# Patient Record
Sex: MALE | Race: WHITE | Employment: FULL TIME | URBAN - NONMETROPOLITAN AREA
[De-identification: names, ages, dates, MRNs, and addresses within clinical notes are randomized per-mention and may not be internally consistent; named-entity substitution may affect disease eponyms.]

---

## 2017-01-03 RX ORDER — MELOXICAM 15 MG/1
TABLET ORAL
Qty: 30 TABLET | Refills: 3 | Status: SHIPPED | OUTPATIENT
Start: 2017-01-03 | End: 2017-08-03

## 2017-01-11 RX ORDER — LANSOPRAZOLE 30 MG/1
CAPSULE, DELAYED RELEASE ORAL
Qty: 30 CAPSULE | Refills: 0 | Status: SHIPPED | OUTPATIENT
Start: 2017-01-11 | End: 2017-02-12

## 2017-01-11 NOTE — TELEPHONE ENCOUNTER
L/m on pt's v/m that he is due for OV and fasting labs.  Advised to please schedule these before he needs next refill-----medication refilled [id number] only

## 2017-01-12 RX ORDER — SIMVASTATIN 10 MG
TABLET ORAL
Qty: 30 TABLET | Refills: 0 | Status: SHIPPED | OUTPATIENT
Start: 2017-01-12 | End: 2017-03-12

## 2017-01-12 NOTE — TELEPHONE ENCOUNTER
Last OV: 7/1/2016  Last labs: 7/1/2016  Last filled: 12/15/2016 #30 no RF    Letter sent requesting pt to schedule labs

## 2017-01-28 ENCOUNTER — OFFICE VISIT (OUTPATIENT)
Dept: FAMILY MEDICINE CLINIC | Facility: CLINIC | Age: 57
End: 2017-01-28

## 2017-01-28 ENCOUNTER — PRIOR ORIGINAL RECORDS (OUTPATIENT)
Dept: OTHER | Age: 57
End: 2017-01-28

## 2017-01-28 VITALS
WEIGHT: 235 LBS | OXYGEN SATURATION: 98 % | SYSTOLIC BLOOD PRESSURE: 120 MMHG | TEMPERATURE: 98 F | BODY MASS INDEX: 35 KG/M2 | HEART RATE: 68 BPM | DIASTOLIC BLOOD PRESSURE: 70 MMHG

## 2017-01-28 DIAGNOSIS — I48.20 CHRONIC ATRIAL FIBRILLATION (HCC): ICD-10-CM

## 2017-01-28 DIAGNOSIS — E78.00 HYPERCHOLESTEREMIA: ICD-10-CM

## 2017-01-28 DIAGNOSIS — J00 ACUTE NASOPHARYNGITIS: ICD-10-CM

## 2017-01-28 DIAGNOSIS — M17.11 PRIMARY OSTEOARTHRITIS OF RIGHT KNEE: ICD-10-CM

## 2017-01-28 DIAGNOSIS — Z12.5 SCREENING FOR PROSTATE CANCER: ICD-10-CM

## 2017-01-28 DIAGNOSIS — K21.9 GASTROESOPHAGEAL REFLUX DISEASE WITHOUT ESOPHAGITIS: Primary | ICD-10-CM

## 2017-01-28 DIAGNOSIS — E11.9 TYPE 2 DIABETES MELLITUS WITHOUT COMPLICATION, WITHOUT LONG-TERM CURRENT USE OF INSULIN (HCC): ICD-10-CM

## 2017-01-28 DIAGNOSIS — R35.1 NOCTURIA: ICD-10-CM

## 2017-01-28 LAB
ALBUMIN SERPL-MCNC: 4 G/DL (ref 3.5–4.8)
ALP LIVER SERPL-CCNC: 85 U/L (ref 45–117)
ALT SERPL-CCNC: 57 U/L (ref 17–63)
APPEARANCE: CLEAR
AST SERPL-CCNC: 25 U/L (ref 15–41)
BILIRUB SERPL-MCNC: 0.6 MG/DL (ref 0.1–2)
BUN BLD-MCNC: 16 MG/DL (ref 8–20)
CALCIUM BLD-MCNC: 8.6 MG/DL (ref 8.3–10.3)
CHLORIDE: 109 MMOL/L (ref 101–111)
CHOLEST SMN-MCNC: 115 MG/DL (ref ?–200)
CO2: 26 MMOL/L (ref 22–32)
COMPLEXED PSA SERPL-MCNC: 0.79 NG/ML (ref 0.01–4)
CREAT BLD-MCNC: 0.81 MG/DL (ref 0.7–1.3)
EST. AVERAGE GLUCOSE BLD GHB EST-MCNC: 128 MG/DL (ref 68–126)
GLUCOSE BLD-MCNC: 103 MG/DL (ref 70–99)
HBA1C MFR BLD HPLC: 6.1 % (ref ?–5.7)
HDLC SERPL-MCNC: 31 MG/DL (ref 45–?)
HDLC SERPL: 3.71 {RATIO} (ref ?–4.97)
LDLC SERPL CALC-MCNC: 69 MG/DL (ref ?–130)
M PROTEIN MFR SERPL ELPH: 7 G/DL (ref 6.1–8.3)
MULTISTIX LOT#: NORMAL NUMERIC
NONHDLC SERPL-MCNC: 84 MG/DL (ref ?–130)
PH, URINE: 6 (ref 4.5–8)
POTASSIUM SERPL-SCNC: 4.3 MMOL/L (ref 3.6–5.1)
SODIUM SERPL-SCNC: 142 MMOL/L (ref 136–144)
SPECIFIC GRAVITY: 1.02 (ref 1–1.03)
TRIGLYCERIDES: 76 MG/DL (ref ?–150)
URINE-COLOR: YELLOW
UROBILINOGEN,SEMI-QN: 0.2 MG/DL (ref 0–1.9)
VLDL: 15 MG/DL (ref 5–40)

## 2017-01-28 PROCEDURE — 99214 OFFICE O/P EST MOD 30 MIN: CPT | Performed by: FAMILY MEDICINE

## 2017-01-28 PROCEDURE — 36415 COLL VENOUS BLD VENIPUNCTURE: CPT | Performed by: FAMILY MEDICINE

## 2017-01-28 PROCEDURE — 80053 COMPREHEN METABOLIC PANEL: CPT | Performed by: FAMILY MEDICINE

## 2017-01-28 PROCEDURE — 83036 HEMOGLOBIN GLYCOSYLATED A1C: CPT | Performed by: FAMILY MEDICINE

## 2017-01-28 PROCEDURE — 81003 URINALYSIS AUTO W/O SCOPE: CPT | Performed by: FAMILY MEDICINE

## 2017-01-28 PROCEDURE — 80061 LIPID PANEL: CPT | Performed by: FAMILY MEDICINE

## 2017-01-28 NOTE — PATIENT INSTRUCTIONS
I reviewed goals for fasting and postmeal blood sugars, hemoglobin A1c, lipids and blood pressure  I strongly encouraged patient to resume checking his blood sugar alternating fasting and 2 hour postmeal checks  We will check fasting lipids, CMP, hemoglobi

## 2017-01-28 NOTE — PROGRESS NOTES
HPI:    Patient ID: Jose Daniel Benson is a 65year old male. Patient presents with:  Diabetes: f/u  Lipids: f/u  Gastro-esophageal Reflux: f/u  Atrial Fibrillation: f/u    HPI pt not checking bs  Pt not checking bp   Pt not exercising, very active with ne Accu-Chek Softclix Lancets Does not apply Misc As directed-----to check blood glucose once daily Disp: 100 each Rfl: 12   EPINEPHrine (EPIPEN 2-ALEXEY) 0.3 MG/0.3ML Injection Device Inject 0.3 mL as directed as needed.  Disp: 2 Device Rfl: 1     Allergies: encounter diagnosis)  Primary osteoarthritis of right knee  Chronic atrial fibrillation (hcc)- currently in sinus rhythm  Acute nasopharyngitis  Nocturia  Patient Instructions   I reviewed goals for fasting and postmeal blood sugars, hemoglobin A1c, lipids

## 2017-01-30 ENCOUNTER — TELEPHONE (OUTPATIENT)
Dept: FAMILY MEDICINE CLINIC | Facility: CLINIC | Age: 57
End: 2017-01-30

## 2017-01-30 DIAGNOSIS — I48.20 CHRONIC ATRIAL FIBRILLATION (HCC): Primary | ICD-10-CM

## 2017-01-30 DIAGNOSIS — E78.00 HYPERCHOLESTEREMIA: ICD-10-CM

## 2017-01-30 DIAGNOSIS — E11.9 TYPE 2 DIABETES MELLITUS WITHOUT COMPLICATION, WITHOUT LONG-TERM CURRENT USE OF INSULIN (HCC): ICD-10-CM

## 2017-01-30 DIAGNOSIS — Z12.5 SCREENING FOR PROSTATE CANCER: ICD-10-CM

## 2017-01-30 DIAGNOSIS — Z79.899 ENCOUNTER FOR LONG-TERM (CURRENT) DRUG USE: ICD-10-CM

## 2017-01-30 RX ORDER — SIMVASTATIN 10 MG
5 TABLET ORAL NIGHTLY
Qty: 1 TABLET | Refills: 0 | COMMUNITY
Start: 2017-01-30 | End: 2017-03-24

## 2017-01-30 NOTE — TELEPHONE ENCOUNTER
----- Message from Lani Vargas.  Laz Gray DO sent at 1/28/2017 11:58 AM CST -----  Eyes patient that urine is perfectly clear, I suspect his urinating at night is related to his excessive fluid intake during the night

## 2017-01-30 NOTE — TELEPHONE ENCOUNTER
----- Message from Alberto Pimentel.  Brianne Pierson DO sent at 1/30/2017  7:21 AM CST -----  Advise pt of stable hgba1c in pre-dm range, cpm, recheck hgba1c in 6 months  Lipids are very low,may decrease simvastatin to 5 mg q hs and recheck 6 months  Chemistries al norm

## 2017-01-30 NOTE — TELEPHONE ENCOUNTER
Pt advised of both message below, verbalizes understanding.    Medlist updated, future orders placed

## 2017-02-01 ENCOUNTER — TELEPHONE (OUTPATIENT)
Dept: FAMILY MEDICINE CLINIC | Facility: CLINIC | Age: 57
End: 2017-02-01

## 2017-02-06 ENCOUNTER — TELEPHONE (OUTPATIENT)
Dept: FAMILY MEDICINE CLINIC | Facility: CLINIC | Age: 57
End: 2017-02-06

## 2017-02-13 RX ORDER — LANSOPRAZOLE 30 MG/1
CAPSULE, DELAYED RELEASE ORAL
Qty: 30 CAPSULE | Refills: 5 | Status: SHIPPED | OUTPATIENT
Start: 2017-02-13 | End: 2017-08-04

## 2017-02-13 NOTE — TELEPHONE ENCOUNTER
Lansoprazole 1/11/17 #30x0  Metformin 1/12/17 #30x0  Last ov 1/28/17  Last labs 1/28/17 re ck 6 mo    No future appointments.

## 2017-03-13 ENCOUNTER — PRIOR ORIGINAL RECORDS (OUTPATIENT)
Dept: OTHER | Age: 57
End: 2017-03-13

## 2017-03-13 RX ORDER — SIMVASTATIN 10 MG
5 TABLET ORAL NIGHTLY
Qty: 15 TABLET | Refills: 4 | Status: SHIPPED | OUTPATIENT
Start: 2017-03-13 | End: 2017-08-03

## 2017-03-15 LAB
ALBUMIN: 4 G/DL
ALKALINE PHOSPHATATE(ALK PHOS): 85 IU/L
BILIRUBIN TOTAL: 0.6 MG/DL
BUN: 16 MG/DL
CALCIUM: 8.6 MG/DL
CHLORIDE: 109 MEQ/L
CHOLESTEROL, TOTAL: 115 MG/DL
CREATININE, SERUM: 0.81 MG/DL
GLUCOSE: 103 MG/DL
HDL CHOLESTEROL: 31 MG/DL
HEMOGLOBIN A1C: 6.1 %
LDL CHOLESTEROL: 69 MG/DL
POTASSIUM, SERUM: 4.3 MEQ/L
PROTEIN, TOTAL: 7 G/DL
SGOT (AST): 25 IU/L
SGPT (ALT): 57 IU/L
SODIUM: 142 MEQ/L
TRIGLYCERIDES: 76 MG/DL

## 2017-03-17 ENCOUNTER — TELEPHONE (OUTPATIENT)
Dept: FAMILY MEDICINE CLINIC | Facility: CLINIC | Age: 57
End: 2017-03-17

## 2017-03-17 NOTE — TELEPHONE ENCOUNTER
Pt's wife calls. [facility] pt saw Dr. Beaver Born on 3/13 and he ordered a 24hr Holter Monitor. Pt is scheduled to have monitor put on tomorrow. Wife asks if we know if the referral for the monitor has been approved yet? Pt has [facility] insurance.   Advised we didn'

## 2017-03-23 ENCOUNTER — PRIOR ORIGINAL RECORDS (OUTPATIENT)
Dept: OTHER | Age: 57
End: 2017-03-23

## 2017-03-23 ENCOUNTER — TELEPHONE (OUTPATIENT)
Dept: FAMILY MEDICINE CLINIC | Facility: CLINIC | Age: 57
End: 2017-03-23

## 2017-03-23 DIAGNOSIS — I48.91 ATRIAL FIBRILLATION, UNSPECIFIED TYPE (HCC): Primary | ICD-10-CM

## 2017-03-23 NOTE — TELEPHONE ENCOUNTER
Spoke Nisha Nelson who states that she spoke with the insurance company who states that the holter monitor will be covered as long as it is placed in house.  Advised pt to contact Dr. Loren Lara office and see if the referral is placed and ask who is placing

## 2017-03-23 NOTE — TELEPHONE ENCOUNTER
Rodney Molina states that the spouse called their office and states that pt needs a referral for the monitor and per Rodney Molina, Dr. Kristi Rivas office does not do referrals as they are a considered a specialist. Rodney Molina states that the spouse states that it has to be throug

## 2017-03-23 NOTE — TELEPHONE ENCOUNTER
Who's requesting the holter? Why?   Has pt seen Dr Priyanka Barker recently?, if so, please get note

## 2017-03-24 ENCOUNTER — MED REC SCAN ONLY (OUTPATIENT)
Dept: FAMILY MEDICINE CLINIC | Facility: CLINIC | Age: 57
End: 2017-03-24

## 2017-03-24 ENCOUNTER — OFFICE VISIT (OUTPATIENT)
Dept: FAMILY MEDICINE CLINIC | Facility: CLINIC | Age: 57
End: 2017-03-24

## 2017-03-24 ENCOUNTER — HOSPITAL ENCOUNTER (OUTPATIENT)
Dept: CV DIAGNOSTICS | Facility: HOSPITAL | Age: 57
Discharge: HOME | End: 2017-03-24
Attending: FAMILY MEDICINE
Payer: COMMERCIAL

## 2017-03-24 VITALS
HEART RATE: 76 BPM | SYSTOLIC BLOOD PRESSURE: 120 MMHG | OXYGEN SATURATION: 99 % | DIASTOLIC BLOOD PRESSURE: 70 MMHG | TEMPERATURE: 98 F

## 2017-03-24 DIAGNOSIS — M13.0 POLYARTHRITIS OF MULTIPLE SITES: Primary | ICD-10-CM

## 2017-03-24 LAB
BASOPHILS # BLD AUTO: 0.04 X10(3) UL (ref 0–0.1)
BASOPHILS NFR BLD AUTO: 0.5 %
C-REACTIVE PROTEIN: <0.29 MG/DL (ref ?–1)
EOSINOPHIL # BLD AUTO: 0.12 X10(3) UL (ref 0–0.3)
EOSINOPHIL NFR BLD AUTO: 1.4 %
ERYTHROCYTE [DISTWIDTH] IN BLOOD BY AUTOMATED COUNT: 13.2 % (ref 11.5–16)
HCT VFR BLD AUTO: 46 % (ref 37–53)
HGB BLD-MCNC: 15.5 G/DL (ref 13–17)
IMMATURE GRANULOCYTE COUNT: 0.02 X10(3) UL (ref 0–1)
IMMATURE GRANULOCYTE RATIO %: 0.2 %
LYMPHOCYTES # BLD AUTO: 3.08 X10(3) UL (ref 0.9–4)
LYMPHOCYTES NFR BLD AUTO: 34.8 %
MCH RBC QN AUTO: 31.4 PG (ref 27–33.2)
MCHC RBC AUTO-ENTMCNC: 33.7 G/DL (ref 31–37)
MCV RBC AUTO: 93.1 FL (ref 80–99)
MONOCYTES # BLD AUTO: 0.81 X10(3) UL (ref 0.1–0.6)
MONOCYTES NFR BLD AUTO: 9.1 %
NEUTROPHIL ABS PRELIM: 4.79 X10 (3) UL (ref 1.3–6.7)
NEUTROPHILS # BLD AUTO: 4.79 X10(3) UL (ref 1.3–6.7)
NEUTROPHILS NFR BLD AUTO: 54 %
PLATELET # BLD AUTO: 195 10(3)UL (ref 150–450)
RBC # BLD AUTO: 4.94 X10(6)UL (ref 4.3–5.7)
RED CELL DISTRIBUTION WIDTH-SD: 45.1 FL (ref 35.1–46.3)
RHEUMATOID FACT SERPL-ACNC: <10 IU/ML (ref ?–15)
WBC # BLD AUTO: 8.9 X10(3) UL (ref 4–13)

## 2017-03-24 PROCEDURE — 85652 RBC SED RATE AUTOMATED: CPT | Performed by: FAMILY MEDICINE

## 2017-03-24 PROCEDURE — 93225 XTRNL ECG REC<48 HRS REC: CPT

## 2017-03-24 PROCEDURE — 86140 C-REACTIVE PROTEIN: CPT | Performed by: FAMILY MEDICINE

## 2017-03-24 PROCEDURE — 93226 XTRNL ECG REC<48 HR SCAN A/R: CPT

## 2017-03-24 PROCEDURE — 86038 ANTINUCLEAR ANTIBODIES: CPT | Performed by: FAMILY MEDICINE

## 2017-03-24 PROCEDURE — 36415 COLL VENOUS BLD VENIPUNCTURE: CPT | Performed by: FAMILY MEDICINE

## 2017-03-24 PROCEDURE — 93227 XTRNL ECG REC<48 HR R&I: CPT | Performed by: FAMILY MEDICINE

## 2017-03-24 PROCEDURE — 85025 COMPLETE CBC W/AUTO DIFF WBC: CPT | Performed by: FAMILY MEDICINE

## 2017-03-24 PROCEDURE — 99214 OFFICE O/P EST MOD 30 MIN: CPT | Performed by: FAMILY MEDICINE

## 2017-03-24 PROCEDURE — 86431 RHEUMATOID FACTOR QUANT: CPT | Performed by: FAMILY MEDICINE

## 2017-03-24 NOTE — PATIENT INSTRUCTIONS
I have asked patient to discontinue his meloxicam, will substitute diclofenac 50 mg twice daily with food  We will check CBC, CRP, sed rate, YVETTE and rheumatoid factor.   If all of the above are negative, would consider discontinuing simvastatin for 2 weeks

## 2017-03-24 NOTE — TELEPHONE ENCOUNTER
Pt scheduled for 24hr holter here. Spoke with [address] [place] [address] @ [facility] to confirm that a referral doesn't need to be done. They both said no, just an order put in Dr. Dr. Ramana Bullock.  Wife advised this was done

## 2017-03-24 NOTE — PROGRESS NOTES
HPI:    Patient ID: Dino Aguilar is a 65year old male. Patient presents with:  Pain: BODY ACHES X 1 WEEK--- MELOXICAM NOT WORKING --    HPI c/o increased pain 19 days, pt relates onset to refill of meloxicam, both knees, lower back, both hands, hand Glucose Blood (ACCU-CHEK PRANEETH PLUS) In Vitro Strip As directed---check blood glucose once daily Disp: 100 strip Rfl: 12   Accu-Chek Softclix Lancets Does not apply Misc As directed-----to check blood glucose once daily Disp: 100 each Rfl: 12   EPINEPHrine Psychiatric: His affect is blunt. His speech is rapid and/or pressured. Blood pressure 120/70, pulse 76, temperature 98.1 °F (36.7 °C), temperature source Temporal, SpO2 99 %.        ASSESSMENT/PLAN:   Polyarthritis of multiple sites (hcc)  (primary enco

## 2017-03-25 ENCOUNTER — TELEPHONE (OUTPATIENT)
Dept: FAMILY MEDICINE CLINIC | Facility: CLINIC | Age: 57
End: 2017-03-25

## 2017-03-25 LAB — SED RATE-ML: 4 MM/HR (ref 0–12)

## 2017-03-25 NOTE — TELEPHONE ENCOUNTER
----- Message from Pia Fenton.  Nathan Bowden DO sent at 3/25/2017  7:43 AM CDT -----  Please advise patient all of his labs are normal, no evidence of inflammation  Stop simvastatin for a week or 2 an update on pain in 1-2 weeks, if pain resolves off of simvasta

## 2017-03-26 LAB — ANA SCREEN: NEGATIVE

## 2017-03-30 ENCOUNTER — OFFICE VISIT (OUTPATIENT)
Dept: FAMILY MEDICINE CLINIC | Facility: CLINIC | Age: 57
End: 2017-03-30

## 2017-03-30 DIAGNOSIS — I48.91 ATRIAL FIBRILLATION, UNSPECIFIED TYPE (HCC): ICD-10-CM

## 2017-04-03 ENCOUNTER — TELEPHONE (OUTPATIENT)
Dept: FAMILY MEDICINE CLINIC | Facility: CLINIC | Age: 57
End: 2017-04-03

## 2017-04-03 NOTE — TELEPHONE ENCOUNTER
PC from Alyssa @ [facility] @ [phone] (the company that reads the holter monitors)  States pt had significant findings:  Minimum HR was 32 during a-fib, a-fib lasted most of the 24 hours. Report being faxed to us now for Dr. Tre Wyatt to review.

## 2017-04-04 ENCOUNTER — PRIOR ORIGINAL RECORDS (OUTPATIENT)
Dept: OTHER | Age: 57
End: 2017-04-04

## 2017-04-05 ENCOUNTER — TELEPHONE (OUTPATIENT)
Dept: FAMILY MEDICINE CLINIC | Facility: CLINIC | Age: 57
End: 2017-04-05

## 2017-04-05 ENCOUNTER — PRIOR ORIGINAL RECORDS (OUTPATIENT)
Dept: OTHER | Age: 57
End: 2017-04-05

## 2017-04-05 DIAGNOSIS — I48.0 PAROXYSMAL ATRIAL FIBRILLATION (HCC): Primary | ICD-10-CM

## 2017-04-05 NOTE — TELEPHONE ENCOUNTER
Spoke with pt and his wife. States Dr. Jamie Staley [facility] called him re: his 24hr holter monitor results---showed majority of time in a-fib. They recommended he see EP----Dr. Nuno Jackson. Scheduled an appt for him in [facility] on 4/18.  Pt needs [facility] referral.  R

## 2017-04-06 ENCOUNTER — TELEPHONE (OUTPATIENT)
Dept: FAMILY MEDICINE CLINIC | Facility: CLINIC | Age: 57
End: 2017-04-06

## 2017-04-06 DIAGNOSIS — I48.20 CHRONIC ATRIAL FIBRILLATION (HCC): Primary | ICD-10-CM

## 2017-04-06 NOTE — TELEPHONE ENCOUNTER
Spoke with Greer Mcgee in regards to insurance. Jean Carlos Eng that since the [facility] PAULY is still listed as an insurance, the referral will remain in place.  Greer Mcgee states that pt does have an appointment on 4/18 with Dr. Bryan Barbosa and just wants to be sure that it will be covmatt

## 2017-04-06 NOTE — TELEPHONE ENCOUNTER
Violetta David states that she now has PPO insurance  through the [place] and would like a referral placed for Dr. Nuno Jackson under the new insurance. Advised that typically with PPO insurance, referrals do not need to be placed.  Violetta David would prefer to have a referral place

## 2017-04-08 RX ORDER — RIVAROXABAN 20 MG/1
TABLET, FILM COATED ORAL
Qty: 30 TABLET | Refills: 0 | Status: SHIPPED | OUTPATIENT
Start: 2017-04-08 | End: 2017-05-10

## 2017-04-18 ENCOUNTER — PRIOR ORIGINAL RECORDS (OUTPATIENT)
Dept: OTHER | Age: 57
End: 2017-04-18

## 2017-04-18 ENCOUNTER — MYAURORA ACCOUNT LINK (OUTPATIENT)
Dept: OTHER | Age: 57
End: 2017-04-18

## 2017-05-06 ENCOUNTER — HOSPITAL ENCOUNTER (OUTPATIENT)
Dept: CV DIAGNOSTICS | Facility: HOSPITAL | Age: 57
Discharge: HOME | End: 2017-05-06
Attending: INTERNAL MEDICINE
Payer: COMMERCIAL

## 2017-05-06 DIAGNOSIS — I48.0 PAF (PAROXYSMAL ATRIAL FIBRILLATION) (HCC): ICD-10-CM

## 2017-05-06 PROCEDURE — 78452 HT MUSCLE IMAGE SPECT MULT: CPT | Performed by: INTERNAL MEDICINE

## 2017-05-06 PROCEDURE — 93017 CV STRESS TEST TRACING ONLY: CPT | Performed by: INTERNAL MEDICINE

## 2017-05-06 PROCEDURE — 93018 CV STRESS TEST I&R ONLY: CPT | Performed by: INTERNAL MEDICINE

## 2017-05-08 ENCOUNTER — PRIOR ORIGINAL RECORDS (OUTPATIENT)
Dept: OTHER | Age: 57
End: 2017-05-08

## 2017-05-22 ENCOUNTER — MED REC SCAN ONLY (OUTPATIENT)
Dept: FAMILY MEDICINE CLINIC | Facility: CLINIC | Age: 57
End: 2017-05-22

## 2017-06-26 ENCOUNTER — TELEPHONE (OUTPATIENT)
Dept: FAMILY MEDICINE CLINIC | Facility: CLINIC | Age: 57
End: 2017-06-26

## 2017-06-26 RX ORDER — DILTIAZEM HYDROCHLORIDE 240 MG/1
240 CAPSULE, COATED, EXTENDED RELEASE ORAL DAILY
Qty: 90 CAPSULE | Refills: 0 | Status: SHIPPED | OUTPATIENT
Start: 2017-06-26 | End: 2017-08-04

## 2017-06-26 NOTE — TELEPHONE ENCOUNTER
Diltiazem HCl ER Coated Beads (CARTIA XT) 240 MG Oral Capsule  90 DAY SUPPLY TO [facility] [place]   HAS 5 PILLS LEFT    ASKING IF POSSIBLE TO  TOMORROW SINCE THEY WILL BE IN [place] TOMORROW AND THEN LEAVING [facility].

## 2017-08-03 ENCOUNTER — OFFICE VISIT (OUTPATIENT)
Dept: FAMILY MEDICINE CLINIC | Facility: CLINIC | Age: 57
End: 2017-08-03

## 2017-08-03 ENCOUNTER — PRIOR ORIGINAL RECORDS (OUTPATIENT)
Dept: OTHER | Age: 57
End: 2017-08-03

## 2017-08-03 ENCOUNTER — APPOINTMENT (OUTPATIENT)
Dept: LAB | Age: 57
End: 2017-08-03
Attending: FAMILY MEDICINE
Payer: COMMERCIAL

## 2017-08-03 VITALS
SYSTOLIC BLOOD PRESSURE: 120 MMHG | HEART RATE: 78 BPM | BODY MASS INDEX: 35 KG/M2 | DIASTOLIC BLOOD PRESSURE: 80 MMHG | WEIGHT: 240 LBS | OXYGEN SATURATION: 96 % | TEMPERATURE: 98 F

## 2017-08-03 DIAGNOSIS — J20.9 BRONCHITIS WITH BRONCHOSPASM: ICD-10-CM

## 2017-08-03 DIAGNOSIS — I48.20 CHRONIC ATRIAL FIBRILLATION (HCC): ICD-10-CM

## 2017-08-03 DIAGNOSIS — E11.9 TYPE 2 DIABETES MELLITUS WITHOUT COMPLICATION, WITHOUT LONG-TERM CURRENT USE OF INSULIN (HCC): Primary | ICD-10-CM

## 2017-08-03 DIAGNOSIS — M17.11 PRIMARY OSTEOARTHRITIS OF RIGHT KNEE: ICD-10-CM

## 2017-08-03 DIAGNOSIS — J01.40 ACUTE NON-RECURRENT PANSINUSITIS: ICD-10-CM

## 2017-08-03 DIAGNOSIS — E78.00 HYPERCHOLESTEREMIA: ICD-10-CM

## 2017-08-03 DIAGNOSIS — Z79.899 ENCOUNTER FOR LONG-TERM (CURRENT) DRUG USE: ICD-10-CM

## 2017-08-03 LAB
ALBUMIN SERPL-MCNC: 3.8 G/DL (ref 3.5–4.8)
ALP LIVER SERPL-CCNC: 83 U/L (ref 45–117)
ALT SERPL-CCNC: 59 U/L (ref 17–63)
AST SERPL-CCNC: 22 U/L (ref 15–41)
BILIRUB SERPL-MCNC: 0.3 MG/DL (ref 0.1–2)
BUN BLD-MCNC: 22 MG/DL (ref 8–20)
CALCIUM BLD-MCNC: 9.2 MG/DL (ref 8.3–10.3)
CHLORIDE: 114 MMOL/L (ref 101–111)
CHOLEST SMN-MCNC: 179 MG/DL (ref ?–200)
CO2: 25 MMOL/L (ref 22–32)
CREAT BLD-MCNC: 0.92 MG/DL (ref 0.7–1.3)
CREAT UR-SCNC: 199 MG/DL
EST. AVERAGE GLUCOSE BLD GHB EST-MCNC: 117 MG/DL (ref 68–126)
GLUCOSE BLD-MCNC: 75 MG/DL (ref 70–99)
HBA1C MFR BLD HPLC: 5.7 % (ref ?–5.7)
HDLC SERPL-MCNC: 24 MG/DL (ref 45–?)
HDLC SERPL: 7.46 {RATIO} (ref ?–4.97)
LDLC SERPL CALC-MCNC: 108 MG/DL (ref ?–130)
LDLC SERPL-MCNC: 47 MG/DL (ref 5–40)
M PROTEIN MFR SERPL ELPH: 7 G/DL (ref 6.1–8.3)
MICROALBUMIN UR-MCNC: 0.91 MG/DL
MICROALBUMIN/CREAT 24H UR-RTO: 4.6 UG/MG (ref ?–30)
NONHDLC SERPL-MCNC: 155 MG/DL (ref ?–130)
POTASSIUM SERPL-SCNC: 4.5 MMOL/L (ref 3.6–5.1)
SODIUM SERPL-SCNC: 145 MMOL/L (ref 136–144)
TRIGLYCERIDES: 236 MG/DL (ref ?–150)

## 2017-08-03 PROCEDURE — 99215 OFFICE O/P EST HI 40 MIN: CPT | Performed by: FAMILY MEDICINE

## 2017-08-03 RX ORDER — ALBUTEROL SULFATE 90 UG/1
2 AEROSOL, METERED RESPIRATORY (INHALATION) EVERY 4 HOURS PRN
Qty: 1 INHALER | Refills: 0 | Status: SHIPPED | OUTPATIENT
Start: 2017-08-03 | End: 2017-08-28 | Stop reason: ALTCHOICE

## 2017-08-03 RX ORDER — HYDROCODONE BITARTRATE AND ACETAMINOPHEN 5; 325 MG/1; MG/1
1 TABLET ORAL NIGHTLY PRN
Qty: 20 TABLET | Refills: 0 | Status: SHIPPED | OUTPATIENT
Start: 2017-08-03 | End: 2018-03-24 | Stop reason: ALTCHOICE

## 2017-08-03 RX ORDER — AZITHROMYCIN 250 MG/1
TABLET, FILM COATED ORAL
Qty: 6 TABLET | Refills: 0 | Status: SHIPPED | OUTPATIENT
Start: 2017-08-03 | End: 2017-08-28 | Stop reason: ALTCHOICE

## 2017-08-03 RX ORDER — NABUMETONE 500 MG/1
500 TABLET, FILM COATED ORAL 2 TIMES DAILY
Qty: 30 TABLET | Refills: 0 | Status: SHIPPED | OUTPATIENT
Start: 2017-08-03 | End: 2017-08-28

## 2017-08-03 RX ORDER — PREDNISONE 20 MG/1
20 TABLET ORAL 2 TIMES DAILY
Qty: 10 TABLET | Refills: 0 | Status: SHIPPED | OUTPATIENT
Start: 2017-08-03 | End: 2017-08-13

## 2017-08-03 NOTE — PATIENT INSTRUCTIONS
I reviewed goals for fasting and postmeal blood sugars as well as hemoglobin A1c, blood pressure lipids.   I reviewed conservative management of hypertension including sodium restriction, daily aerobic activity, alcohol moderation, and maintaining ideal bod

## 2017-08-03 NOTE — PROGRESS NOTES
HPI:    Patient ID: Nirav Almendarez. Deysi Story. is a 65year old male.   Patient presents with:  Diabetes: f/u  HTN: f/u  Lipids: f/u  Atrial Fibrillation: f/u  Nasal Congestion: SINUSITIS  Joint Pain    HPI still smoking,c/o chest congestion x 2 months, prod of Disp: 60 tablet Rfl: 1   DilTIAZem HCl ER Coated Beads (CARTIA XT) 240 MG Oral Capsule SR 24 Hr Take 1 capsule (240 mg total) by mouth daily. Disp: 90 capsule Rfl: 0   Rivaroxaban (XARELTO) 20 MG Oral Tab Take 1 tablet (20 mg total) by mouth once daily.  [place] Feathers decreased breath sounds. He has wheezes. He has no rhonchi. He has no rales. Abdominal: Soft. Bowel sounds are normal. There is no tenderness.    Musculoskeletal:        Right knee: He exhibits decreased range of motion ( Flexion limited to 90°, extension fasting lipids, A1c, CMP and microalbumin screen today  Patient was advised to resume his simvastatin 5 mg nightly  Patient was referred to Dr. Erick Vaughn for orthopedic evaluation of severe right knee arthritis.   Will discontinue diclofenac and try gen

## 2017-08-04 DIAGNOSIS — E11.9 TYPE 2 DIABETES MELLITUS WITHOUT COMPLICATION, WITHOUT LONG-TERM CURRENT USE OF INSULIN (HCC): ICD-10-CM

## 2017-08-04 DIAGNOSIS — Z79.899 ENCOUNTER FOR LONG-TERM (CURRENT) DRUG USE: ICD-10-CM

## 2017-08-04 DIAGNOSIS — E78.00 HYPERCHOLESTEREMIA: Primary | ICD-10-CM

## 2017-08-04 RX ORDER — LANSOPRAZOLE 30 MG/1
CAPSULE, DELAYED RELEASE ORAL
Qty: 90 CAPSULE | Refills: 1 | Status: SHIPPED | OUTPATIENT
Start: 2017-08-04 | End: 2017-12-08

## 2017-08-04 RX ORDER — SIMVASTATIN 10 MG
5 TABLET ORAL NIGHTLY
Qty: 45 TABLET | Refills: 1 | Status: SHIPPED | OUTPATIENT
Start: 2017-08-04 | End: 2017-12-08

## 2017-08-04 RX ORDER — DILTIAZEM HYDROCHLORIDE 240 MG/1
240 CAPSULE, COATED, EXTENDED RELEASE ORAL DAILY
Qty: 90 CAPSULE | Refills: 0 | Status: SHIPPED | OUTPATIENT
Start: 2017-08-04 | End: 2017-12-08

## 2017-08-14 ENCOUNTER — TELEPHONE (OUTPATIENT)
Dept: FAMILY MEDICINE CLINIC | Facility: CLINIC | Age: 57
End: 2017-08-14

## 2017-08-14 RX ORDER — AMOXICILLIN AND CLAVULANATE POTASSIUM 875; 125 MG/1; MG/1
1 TABLET, FILM COATED ORAL 2 TIMES DAILY
Qty: 20 TABLET | Refills: 0 | Status: SHIPPED | OUTPATIENT
Start: 2017-08-14 | End: 2017-08-24

## 2017-08-14 RX ORDER — MELOXICAM 15 MG/1
15 TABLET ORAL DAILY
Qty: 30 TABLET | Refills: 3 | Status: SHIPPED | OUTPATIENT
Start: 2017-08-14 | End: 2017-12-08

## 2017-08-14 NOTE — TELEPHONE ENCOUNTER
Okay to refill meloxicam 15 mg daily, #30 with 3 refills  Augmentin 875 mg twice daily with food ×10 days, nasal saline ad eliel. and Flonase 2 sprays per nostril daily

## 2017-08-14 NOTE — TELEPHONE ENCOUNTER
Pt's wife calls. 1. States pt has been taking nabumetone since 8/3 OV w/o any relief. Pt said it's not helping his arthritis at all and he wants to go back on meloxicam. Said that had always worked well, until recently and he stopped it.   Requests to try

## 2017-08-28 ENCOUNTER — TELEPHONE (OUTPATIENT)
Dept: FAMILY MEDICINE CLINIC | Facility: CLINIC | Age: 57
End: 2017-08-28

## 2017-08-28 RX ORDER — EPINEPHRINE 0.3 MG/.3ML
0.3 INJECTION SUBCUTANEOUS ONCE
Qty: 1 EACH | Refills: 1 | Status: SHIPPED | OUTPATIENT
Start: 2017-08-28 | End: 2017-08-28

## 2017-08-28 NOTE — TELEPHONE ENCOUNTER
EPI DAX      MIR SAID THE SPECIALIST TOLD HIM TO HAVE ONE THAT HAS NOT      [facility]  [phone]

## 2017-08-29 NOTE — TELEPHONE ENCOUNTER
Pt's wife advised of below. She asks that I send a note stating the below through [employer] so she can read it to pt.

## 2017-08-29 NOTE — TELEPHONE ENCOUNTER
Please advise patient/wife that any NSAID or Shaw 2 inhibitor such as Celebrex has potential to increase bleeding in patients on blood thinners and antiplatelet drugs.   So far, patient has tolerated the meloxicam without any adverse increased bleeding or br

## 2017-11-02 NOTE — TELEPHONE ENCOUNTER
Fax request received from [facility] Darline Cabarl regarding Xarelto 20mg. Last office visit 8-3-17  Last refill 5-10-17 #90 with 1 refill.

## 2017-11-16 ENCOUNTER — TELEPHONE (OUTPATIENT)
Dept: FAMILY MEDICINE CLINIC | Facility: CLINIC | Age: 57
End: 2017-11-16

## 2017-11-16 NOTE — TELEPHONE ENCOUNTER
Returned phone call to [employer] at pharmacy. Notified him we have not refilled this since august and they have been filling. He just wanted to clarify that there would be a drug interaction at 10 mg of simvastatin.  Verified they have 0.5 mg tablets qd on file

## 2017-12-08 RX ORDER — DILTIAZEM HYDROCHLORIDE 240 MG/1
240 CAPSULE, COATED, EXTENDED RELEASE ORAL DAILY
Qty: 90 CAPSULE | Refills: 0 | Status: SHIPPED | OUTPATIENT
Start: 2017-12-08 | End: 2018-03-19

## 2017-12-08 RX ORDER — LANSOPRAZOLE 30 MG/1
CAPSULE, DELAYED RELEASE ORAL
Qty: 90 CAPSULE | Refills: 0 | Status: SHIPPED | OUTPATIENT
Start: 2017-12-08 | End: 2018-05-02

## 2017-12-08 RX ORDER — SIMVASTATIN 10 MG
5 TABLET ORAL NIGHTLY
Qty: 45 TABLET | Refills: 1 | Status: SHIPPED | OUTPATIENT
Start: 2017-12-08 | End: 2018-08-13

## 2017-12-08 RX ORDER — MELOXICAM 15 MG/1
15 TABLET ORAL DAILY
Qty: 30 TABLET | Refills: 3 | Status: SHIPPED | OUTPATIENT
Start: 2017-12-08 | End: 2018-04-10

## 2017-12-08 NOTE — TELEPHONE ENCOUNTER
Labs due in Feb----all meds refilled except meloxicam.  Last meloxicam RF was #30 w/ 3 refills on 8/14/17.   Last OV 8/3/17

## 2017-12-08 NOTE — TELEPHONE ENCOUNTER
DilTIAZem HCl ER Coated Beads (CARTIA XT) 240 MG  lansoprazole 30 MG  MetFORMIN HCl 500 MG  Meloxicam 15 MG  simvastatin 10 MG    [facility] [place] - needs 90 day supply (not due for labs an appt till Feb)

## 2018-03-19 ENCOUNTER — TELEPHONE (OUTPATIENT)
Dept: FAMILY MEDICINE CLINIC | Facility: CLINIC | Age: 58
End: 2018-03-19

## 2018-03-19 RX ORDER — DILTIAZEM HYDROCHLORIDE 240 MG/1
240 CAPSULE, COATED, EXTENDED RELEASE ORAL DAILY
Qty: 90 CAPSULE | Refills: 0 | Status: SHIPPED | OUTPATIENT
Start: 2018-03-19 | End: 2018-06-17

## 2018-03-23 ENCOUNTER — MYAURORA ACCOUNT LINK (OUTPATIENT)
Dept: OTHER | Age: 58
End: 2018-03-23

## 2018-03-23 ENCOUNTER — PRIOR ORIGINAL RECORDS (OUTPATIENT)
Dept: OTHER | Age: 58
End: 2018-03-23

## 2018-03-24 ENCOUNTER — OFFICE VISIT (OUTPATIENT)
Dept: FAMILY MEDICINE CLINIC | Facility: CLINIC | Age: 58
End: 2018-03-24

## 2018-03-24 VITALS
HEART RATE: 74 BPM | BODY MASS INDEX: 35.77 KG/M2 | TEMPERATURE: 98 F | SYSTOLIC BLOOD PRESSURE: 124 MMHG | HEIGHT: 68 IN | DIASTOLIC BLOOD PRESSURE: 68 MMHG | OXYGEN SATURATION: 97 % | WEIGHT: 236 LBS

## 2018-03-24 DIAGNOSIS — Z11.59 ENCOUNTER FOR HEPATITIS C SCREENING TEST FOR LOW RISK PATIENT: ICD-10-CM

## 2018-03-24 DIAGNOSIS — K21.9 GASTROESOPHAGEAL REFLUX DISEASE WITHOUT ESOPHAGITIS: ICD-10-CM

## 2018-03-24 DIAGNOSIS — I48.0 PAROXYSMAL ATRIAL FIBRILLATION (HCC): ICD-10-CM

## 2018-03-24 DIAGNOSIS — H69.83 DYSFUNCTION OF BOTH EUSTACHIAN TUBES: ICD-10-CM

## 2018-03-24 DIAGNOSIS — F17.200 TOBACCO DEPENDENCE: ICD-10-CM

## 2018-03-24 DIAGNOSIS — R09.81 CHRONIC NASAL CONGESTION: ICD-10-CM

## 2018-03-24 DIAGNOSIS — E11.9 TYPE 2 DIABETES MELLITUS WITHOUT COMPLICATION, WITHOUT LONG-TERM CURRENT USE OF INSULIN (HCC): ICD-10-CM

## 2018-03-24 DIAGNOSIS — Z00.00 PREVENTATIVE HEALTH CARE: Primary | ICD-10-CM

## 2018-03-24 DIAGNOSIS — Z12.5 SCREENING FOR PROSTATE CANCER: ICD-10-CM

## 2018-03-24 DIAGNOSIS — R91.1 LUNG NODULE: ICD-10-CM

## 2018-03-24 DIAGNOSIS — E66.01 SEVERE OBESITY (BMI 35.0-35.9 WITH COMORBIDITY) (HCC): ICD-10-CM

## 2018-03-24 DIAGNOSIS — E78.00 HYPERCHOLESTEREMIA: ICD-10-CM

## 2018-03-24 DIAGNOSIS — M17.11 PRIMARY OSTEOARTHRITIS OF RIGHT KNEE: ICD-10-CM

## 2018-03-24 LAB
ALBUMIN SERPL-MCNC: 4.1 G/DL (ref 3.5–4.8)
ALP LIVER SERPL-CCNC: 90 U/L (ref 45–117)
ALT SERPL-CCNC: 49 U/L (ref 17–63)
AST SERPL-CCNC: 24 U/L (ref 15–41)
BILIRUB SERPL-MCNC: 0.5 MG/DL (ref 0.1–2)
BUN BLD-MCNC: 21 MG/DL (ref 8–20)
CALCIUM BLD-MCNC: 9.1 MG/DL (ref 8.3–10.3)
CHLORIDE: 109 MMOL/L (ref 101–111)
CHOLEST SMN-MCNC: 126 MG/DL (ref ?–200)
CO2: 25 MMOL/L (ref 22–32)
COMPLEXED PSA SERPL-MCNC: 0.79 NG/ML (ref 0.01–4)
CREAT BLD-MCNC: 0.96 MG/DL (ref 0.7–1.3)
CREAT UR-SCNC: 91.9 MG/DL
EST. AVERAGE GLUCOSE BLD GHB EST-MCNC: 128 MG/DL (ref 68–126)
GLUCOSE BLD-MCNC: 103 MG/DL (ref 70–99)
HBA1C MFR BLD HPLC: 6.1 % (ref ?–5.7)
HDLC SERPL-MCNC: 27 MG/DL (ref 45–?)
HDLC SERPL: 4.67 {RATIO} (ref ?–4.97)
LDLC SERPL CALC-MCNC: 84 MG/DL (ref ?–130)
M PROTEIN MFR SERPL ELPH: 7.2 G/DL (ref 6.1–8.3)
MICROALBUMIN UR-MCNC: 0.56 MG/DL
MICROALBUMIN/CREAT 24H UR-RTO: 6.1 UG/MG (ref ?–30)
NONHDLC SERPL-MCNC: 99 MG/DL (ref ?–130)
POTASSIUM SERPL-SCNC: 4.7 MMOL/L (ref 3.6–5.1)
SODIUM SERPL-SCNC: 140 MMOL/L (ref 136–144)
TRIGL SERPL-MCNC: 73 MG/DL (ref ?–150)
VLDLC SERPL CALC-MCNC: 15 MG/DL (ref 5–40)

## 2018-03-24 PROCEDURE — 80061 LIPID PANEL: CPT | Performed by: FAMILY MEDICINE

## 2018-03-24 PROCEDURE — 99396 PREV VISIT EST AGE 40-64: CPT | Performed by: FAMILY MEDICINE

## 2018-03-24 PROCEDURE — 84153 ASSAY OF PSA TOTAL: CPT | Performed by: FAMILY MEDICINE

## 2018-03-24 PROCEDURE — 82043 UR ALBUMIN QUANTITATIVE: CPT | Performed by: FAMILY MEDICINE

## 2018-03-24 PROCEDURE — 82570 ASSAY OF URINE CREATININE: CPT | Performed by: FAMILY MEDICINE

## 2018-03-24 PROCEDURE — 86803 HEPATITIS C AB TEST: CPT | Performed by: FAMILY MEDICINE

## 2018-03-24 PROCEDURE — 80053 COMPREHEN METABOLIC PANEL: CPT | Performed by: FAMILY MEDICINE

## 2018-03-24 PROCEDURE — 99406 BEHAV CHNG SMOKING 3-10 MIN: CPT | Performed by: FAMILY MEDICINE

## 2018-03-24 PROCEDURE — 36415 COLL VENOUS BLD VENIPUNCTURE: CPT | Performed by: FAMILY MEDICINE

## 2018-03-24 PROCEDURE — 83036 HEMOGLOBIN GLYCOSYLATED A1C: CPT | Performed by: FAMILY MEDICINE

## 2018-03-24 NOTE — H&P
Kanchan Sherwood.  Lanette Ferreira. is a 63year old male who presents for a complete physical exam.   HPI:   Pt voices concerns-chronic nasal congestion,, ears often feel plugged  Here with his wife  Wt Readings from Last 6 Encounters:  03/24/18 : 236 lb  08/28/17 : 2 mouth daily with breakfast. Disp: 45 tablet Rfl: 0   Meloxicam 15 MG Oral Tab Take 1 tablet (15 mg total) by mouth daily. Disp: 30 tablet Rfl: 3   simvastatin 10 MG Oral Tab Take 0.5 tablets (5 mg total) by mouth nightly.  Disp: 45 tablet Rfl: 1   Rivaroxab Smokeless Status: Never Used                        Alcohol Use: No             Specialists: Dr Magaña Erm- cardiology  Occ: runs [employer] ,  : +. Children: daughter. Exercise: walking, active on the job.   Diet: generally healthy     REVIEW O Heart rate and rhythm are irregularly irregular without murmur, no S3, S4, strong peripheral pulses, no peripheral edema  GI: +NBS's,no masses, HSM or tenderness  : two descended testes,no masses,no hernia,no penile lesions  RECTAL: Good sphincter tone, blood sugars as well as hemoglobin A1c, blood pressure lipids. I reviewed conservative management of hypertension including sodium restriction, daily aerobic activity, alcohol moderation, and maintaining ideal body weight.   I discussed importance of annua

## 2018-03-25 LAB — HEPATITIS C VIRUS AB INTERPRETATION: NONREACTIVE

## 2018-03-26 DIAGNOSIS — E78.00 HYPERCHOLESTEREMIA: Primary | ICD-10-CM

## 2018-03-26 DIAGNOSIS — Z12.5 SCREENING FOR PROSTATE CANCER: ICD-10-CM

## 2018-03-26 DIAGNOSIS — Z79.899 ENCOUNTER FOR LONG-TERM (CURRENT) DRUG USE: ICD-10-CM

## 2018-03-26 DIAGNOSIS — E11.9 TYPE 2 DIABETES MELLITUS WITHOUT COMPLICATION, WITHOUT LONG-TERM CURRENT USE OF INSULIN (HCC): ICD-10-CM

## 2018-03-30 LAB
ALBUMIN: 3.8 G/DL
ALKALINE PHOSPHATATE(ALK PHOS): 83 IU/L
BILIRUBIN TOTAL: 0.3 MG/DL
BUN: 22 MG/DL
CALCIUM: 9.2 MG/DL
CHLORIDE: 114 MEQ/L
CHOLESTEROL, TOTAL: 179 MG/DL
CREATININE, SERUM: 0.92 MG/DL
GLUCOSE: 75 MG/DL
HDL CHOLESTEROL: 24 MG/DL
HEMOGLOBIN A1C: 5.7 %
LDL CHOLESTEROL: 108 MG/DL
POTASSIUM, SERUM: 4.5 MEQ/L
PROTEIN, TOTAL: 7 G/DL
SGOT (AST): 22 IU/L
SGPT (ALT): 59 IU/L
SODIUM: 145 MEQ/L
TRIGLYCERIDES: 236 MG/DL

## 2018-04-10 RX ORDER — MELOXICAM 15 MG/1
15 TABLET ORAL DAILY
Qty: 30 TABLET | Refills: 3 | Status: SHIPPED | OUTPATIENT
Start: 2018-04-10 | End: 2018-07-09

## 2018-05-02 ENCOUNTER — TELEPHONE (OUTPATIENT)
Dept: FAMILY MEDICINE CLINIC | Facility: CLINIC | Age: 58
End: 2018-05-02

## 2018-05-02 RX ORDER — LANSOPRAZOLE 30 MG/1
CAPSULE, DELAYED RELEASE ORAL
Qty: 90 CAPSULE | Refills: 1 | Status: SHIPPED | OUTPATIENT
Start: 2018-05-02 | End: 2018-10-24

## 2018-06-18 RX ORDER — DILTIAZEM HYDROCHLORIDE 240 MG/1
240 CAPSULE, COATED, EXTENDED RELEASE ORAL DAILY
Qty: 90 CAPSULE | Refills: 0 | Status: SHIPPED | OUTPATIENT
Start: 2018-06-18 | End: 2018-09-18

## 2018-07-09 NOTE — TELEPHONE ENCOUNTER
LAST OV: 3/24/18   LAST REFILL: 4/10/18 #30 W/ 3 RF    No future appointments. PER ALAINA AT [facility]    INSURANCE PAYS FOR #90 ONLY    PATIENT HAS 1 REFILL OF #30 ON FILE-- NEEDS REFILL FOR #90.     MEDICATION UPDATED TO #90 WITH W/ 0 RF

## 2018-07-10 RX ORDER — MELOXICAM 15 MG/1
15 TABLET ORAL DAILY
Qty: 90 TABLET | Refills: 0 | Status: SHIPPED | OUTPATIENT
Start: 2018-07-10 | End: 2018-10-30

## 2018-07-23 ENCOUNTER — TELEPHONE (OUTPATIENT)
Dept: FAMILY MEDICINE CLINIC | Facility: CLINIC | Age: 58
End: 2018-07-23

## 2018-07-23 NOTE — TELEPHONE ENCOUNTER
Wife calls. States pt c/o gas and bloating after eating certain foods. She asks if pt can take simethicone (Gas-X) along with his other meds?

## 2018-08-13 RX ORDER — SIMVASTATIN 10 MG
TABLET ORAL
Qty: 45 TABLET | Refills: 1 | Status: SHIPPED | OUTPATIENT
Start: 2018-08-13 | End: 2019-01-28

## 2018-09-18 RX ORDER — DILTIAZEM HYDROCHLORIDE 240 MG/1
240 CAPSULE, COATED, EXTENDED RELEASE ORAL DAILY
Qty: 90 CAPSULE | Refills: 0 | Status: SHIPPED | OUTPATIENT
Start: 2018-09-18 | End: 2018-12-11

## 2018-09-21 ENCOUNTER — MYAURORA ACCOUNT LINK (OUTPATIENT)
Dept: OTHER | Age: 58
End: 2018-09-21

## 2018-09-21 ENCOUNTER — APPOINTMENT (OUTPATIENT)
Dept: LAB | Age: 58
End: 2018-09-21
Attending: FAMILY MEDICINE
Payer: COMMERCIAL

## 2018-09-21 ENCOUNTER — NURSE ONLY (OUTPATIENT)
Dept: FAMILY MEDICINE CLINIC | Facility: CLINIC | Age: 58
End: 2018-09-21
Payer: COMMERCIAL

## 2018-09-21 ENCOUNTER — PRIOR ORIGINAL RECORDS (OUTPATIENT)
Dept: OTHER | Age: 58
End: 2018-09-21

## 2018-09-21 ENCOUNTER — OFFICE VISIT (OUTPATIENT)
Dept: FAMILY MEDICINE CLINIC | Facility: CLINIC | Age: 58
End: 2018-09-21

## 2018-09-21 VITALS
OXYGEN SATURATION: 98 % | TEMPERATURE: 98 F | HEART RATE: 78 BPM | WEIGHT: 230 LBS | SYSTOLIC BLOOD PRESSURE: 120 MMHG | BODY MASS INDEX: 35 KG/M2 | DIASTOLIC BLOOD PRESSURE: 80 MMHG

## 2018-09-21 DIAGNOSIS — Z79.899 ENCOUNTER FOR LONG-TERM (CURRENT) DRUG USE: ICD-10-CM

## 2018-09-21 DIAGNOSIS — E11.9 TYPE 2 DIABETES MELLITUS WITHOUT COMPLICATION, WITHOUT LONG-TERM CURRENT USE OF INSULIN (HCC): Primary | ICD-10-CM

## 2018-09-21 DIAGNOSIS — I10 ESSENTIAL HYPERTENSION: ICD-10-CM

## 2018-09-21 DIAGNOSIS — I48.92 ATRIAL FIBRILLATION AND FLUTTER (HCC): Primary | ICD-10-CM

## 2018-09-21 DIAGNOSIS — F17.200 TOBACCO DEPENDENCE: ICD-10-CM

## 2018-09-21 DIAGNOSIS — E78.00 HYPERCHOLESTEREMIA: ICD-10-CM

## 2018-09-21 DIAGNOSIS — I48.0 PAROXYSMAL ATRIAL FIBRILLATION (HCC): ICD-10-CM

## 2018-09-21 DIAGNOSIS — I48.91 ATRIAL FIBRILLATION AND FLUTTER (HCC): Primary | ICD-10-CM

## 2018-09-21 LAB
ALBUMIN SERPL-MCNC: 3.8 G/DL (ref 3.5–4.8)
ALBUMIN/GLOB SERPL: 1.3 {RATIO} (ref 1–2)
ALP LIVER SERPL-CCNC: 82 U/L (ref 45–117)
ALT SERPL-CCNC: 45 U/L (ref 17–63)
ANION GAP SERPL CALC-SCNC: 4 MMOL/L (ref 0–18)
AST SERPL-CCNC: 20 U/L (ref 15–41)
BILIRUB SERPL-MCNC: 0.5 MG/DL (ref 0.1–2)
BUN BLD-MCNC: 23 MG/DL (ref 8–20)
BUN/CREAT SERPL: 28.8 (ref 10–20)
CALCIUM BLD-MCNC: 8.8 MG/DL (ref 8.3–10.3)
CHLORIDE SERPL-SCNC: 111 MMOL/L (ref 101–111)
CHOLEST SMN-MCNC: 138 MG/DL (ref ?–200)
CO2 SERPL-SCNC: 26 MMOL/L (ref 22–32)
CREAT BLD-MCNC: 0.8 MG/DL (ref 0.7–1.3)
EST. AVERAGE GLUCOSE BLD GHB EST-MCNC: 134 MG/DL (ref 68–126)
GLOBULIN PLAS-MCNC: 3 G/DL (ref 2.5–4)
GLUCOSE BLD-MCNC: 105 MG/DL (ref 70–99)
HBA1C MFR BLD HPLC: 6.3 % (ref ?–5.7)
HDLC SERPL-MCNC: 28 MG/DL (ref 40–59)
LDLC SERPL CALC-MCNC: 92 MG/DL (ref ?–100)
M PROTEIN MFR SERPL ELPH: 6.8 G/DL (ref 6.1–8.3)
NONHDLC SERPL-MCNC: 110 MG/DL (ref ?–130)
OSMOLALITY SERPL CALC.SUM OF ELEC: 296 MOSM/KG (ref 275–295)
POTASSIUM SERPL-SCNC: 4.8 MMOL/L (ref 3.6–5.1)
SODIUM SERPL-SCNC: 141 MMOL/L (ref 136–144)
TRIGL SERPL-MCNC: 89 MG/DL (ref 30–149)
VLDLC SERPL CALC-MCNC: 18 MG/DL (ref 0–30)

## 2018-09-21 PROCEDURE — 93000 ELECTROCARDIOGRAM COMPLETE: CPT | Performed by: FAMILY MEDICINE

## 2018-09-21 PROCEDURE — 36415 COLL VENOUS BLD VENIPUNCTURE: CPT | Performed by: FAMILY MEDICINE

## 2018-09-21 PROCEDURE — 99214 OFFICE O/P EST MOD 30 MIN: CPT | Performed by: FAMILY MEDICINE

## 2018-09-21 NOTE — PROGRESS NOTES
HPI:    Patient ID: Cresencio Wilde. is a 63year old male.     Patient presents with:  Diabetes: f/u  Lipids: f/u  Gastro-esophageal Reflux: f/u  Hypertension: f/u  Atrial Fibrillation: f/u  Tobacco Abuse: f/u    Never checks bs  Never checks bp   Wi Comment:difficulty breathing , throat swells  Aleve [Naproxen]        OTHER (SEE COMMENTS)    Comment:DEPRESSION   PHYSICAL EXAM:   Physical Exam   Constitutional: He is oriented to person, place, and time. He appears well-nourished. No distress.    HENT: hemoglobin A1c  Discussed age-appropriate immunizations. Would strongly recommend annual flu vaccine and Pneumovax–patient vehemently refuses vaccinations    Geoffrey Dickerson.  Lizz Mccullough, DO, FAAFP    Orders Placed This Encounter      *Venipuncture      Meds This V

## 2018-09-26 ENCOUNTER — PRIOR ORIGINAL RECORDS (OUTPATIENT)
Dept: OTHER | Age: 58
End: 2018-09-26

## 2018-09-28 ENCOUNTER — PRIOR ORIGINAL RECORDS (OUTPATIENT)
Dept: OTHER | Age: 58
End: 2018-09-28

## 2018-10-04 ENCOUNTER — PRIOR ORIGINAL RECORDS (OUTPATIENT)
Dept: OTHER | Age: 58
End: 2018-10-04

## 2018-10-04 LAB
ALBUMIN: 3.8 G/DL
ALKALINE PHOSPHATATE(ALK PHOS): 82 IU/L
BILIRUBIN TOTAL: 0.5 MG/DL
BUN: 23 MG/DL
CALCIUM: 8.8 MG/DL
CHLORIDE: 111 MEQ/L
CHOLESTEROL, TOTAL: 138 MG/DL
CREATININE, SERUM: 0.8 MG/DL
GLOBULIN: 3 G/DL
GLUCOSE: 105 MG/DL
HDL CHOLESTEROL: 28 MG/DL
HEMOGLOBIN A1C: 6.3 %
LDL CHOLESTEROL: 92 MG/DL
POTASSIUM, SERUM: 4.8 MEQ/L
PROTEIN, TOTAL: 6.8 G/DL
SGOT (AST): 20 IU/L
SGPT (ALT): 45 IU/L
SODIUM: 141 MEQ/L
TRIGLYCERIDES: 89 MG/DL

## 2018-10-12 ENCOUNTER — TELEPHONE (OUTPATIENT)
Dept: FAMILY MEDICINE CLINIC | Facility: CLINIC | Age: 58
End: 2018-10-12

## 2018-10-12 NOTE — TELEPHONE ENCOUNTER
rec'd a fax from [facility] stating pt will be having a procedure done and pt is on Xarelto.     Form faxed back to them stating \"Pt is cleared for treatment with the following precautions/ recommendations: PT MAY STOP [address] [facility]

## 2018-10-24 RX ORDER — LANSOPRAZOLE 30 MG/1
CAPSULE, DELAYED RELEASE ORAL
Qty: 90 CAPSULE | Refills: 1 | Status: SHIPPED | OUTPATIENT
Start: 2018-10-24 | End: 2019-04-28

## 2018-10-31 RX ORDER — MELOXICAM 15 MG/1
15 TABLET ORAL DAILY
Qty: 90 TABLET | Refills: 0 | Status: SHIPPED | OUTPATIENT
Start: 2018-10-31 | End: 2019-02-10

## 2018-12-07 ENCOUNTER — PRIOR ORIGINAL RECORDS (OUTPATIENT)
Dept: OTHER | Age: 58
End: 2018-12-07

## 2018-12-11 ENCOUNTER — TELEPHONE (OUTPATIENT)
Dept: FAMILY MEDICINE CLINIC | Facility: CLINIC | Age: 58
End: 2018-12-11

## 2018-12-11 RX ORDER — DILTIAZEM HYDROCHLORIDE 240 MG/1
240 CAPSULE, COATED, EXTENDED RELEASE ORAL DAILY
Qty: 90 CAPSULE | Refills: 0 | Status: SHIPPED | OUTPATIENT
Start: 2018-12-11 | End: 2019-03-14

## 2018-12-28 ENCOUNTER — PRIOR ORIGINAL RECORDS (OUTPATIENT)
Dept: OTHER | Age: 58
End: 2018-12-28

## 2019-01-28 ENCOUNTER — TELEPHONE (OUTPATIENT)
Dept: FAMILY MEDICINE CLINIC | Facility: CLINIC | Age: 59
End: 2019-01-28

## 2019-01-28 RX ORDER — SIMVASTATIN 10 MG
TABLET ORAL
Qty: 45 TABLET | Refills: 0 | Status: SHIPPED | OUTPATIENT
Start: 2019-01-28 | End: 2019-05-06

## 2019-02-11 RX ORDER — MELOXICAM 15 MG/1
TABLET ORAL
Qty: 90 TABLET | Refills: 0 | Status: SHIPPED | OUTPATIENT
Start: 2019-02-11 | End: 2019-05-04 | Stop reason: ALTCHOICE

## 2019-02-28 VITALS
HEIGHT: 69 IN | HEART RATE: 78 BPM | WEIGHT: 230 LBS | DIASTOLIC BLOOD PRESSURE: 80 MMHG | BODY MASS INDEX: 34.07 KG/M2 | SYSTOLIC BLOOD PRESSURE: 120 MMHG

## 2019-02-28 VITALS
HEIGHT: 69 IN | DIASTOLIC BLOOD PRESSURE: 82 MMHG | SYSTOLIC BLOOD PRESSURE: 120 MMHG | BODY MASS INDEX: 34.96 KG/M2 | WEIGHT: 236 LBS | HEART RATE: 76 BPM

## 2019-03-01 VITALS
WEIGHT: 235 LBS | SYSTOLIC BLOOD PRESSURE: 128 MMHG | DIASTOLIC BLOOD PRESSURE: 68 MMHG | BODY MASS INDEX: 33.64 KG/M2 | HEIGHT: 70 IN | HEART RATE: 70 BPM

## 2019-03-01 VITALS
BODY MASS INDEX: 34.83 KG/M2 | HEART RATE: 74 BPM | WEIGHT: 235.2 LBS | DIASTOLIC BLOOD PRESSURE: 70 MMHG | SYSTOLIC BLOOD PRESSURE: 118 MMHG | HEIGHT: 69 IN

## 2019-03-14 RX ORDER — DILTIAZEM HYDROCHLORIDE 240 MG/1
CAPSULE, COATED, EXTENDED RELEASE ORAL
Qty: 90 CAPSULE | Refills: 0 | Status: SHIPPED | OUTPATIENT
Start: 2019-03-14 | End: 2019-06-05

## 2019-04-24 ENCOUNTER — PATIENT OUTREACH (OUTPATIENT)
Dept: FAMILY MEDICINE CLINIC | Facility: CLINIC | Age: 59
End: 2019-04-24

## 2019-04-29 RX ORDER — LANSOPRAZOLE 30 MG/1
CAPSULE, DELAYED RELEASE ORAL
Qty: 30 CAPSULE | Refills: 0 | Status: SHIPPED | OUTPATIENT
Start: 2019-04-29 | End: 2019-05-25

## 2019-04-29 NOTE — TELEPHONE ENCOUNTER
Please advise patient is overdue for fasting labs and office visit, please schedule, 1 month of meds provided

## 2019-05-02 NOTE — TELEPHONE ENCOUNTER
Future Appointments   Date Time Provider Michi Estrella   5/4/2019  9:15 AM María Rey., DO [facility] [place]

## 2019-05-04 ENCOUNTER — OFFICE VISIT (OUTPATIENT)
Dept: FAMILY MEDICINE CLINIC | Facility: CLINIC | Age: 59
End: 2019-05-04
Payer: COMMERCIAL

## 2019-05-04 VITALS
WEIGHT: 246 LBS | DIASTOLIC BLOOD PRESSURE: 84 MMHG | BODY MASS INDEX: 37.72 KG/M2 | SYSTOLIC BLOOD PRESSURE: 118 MMHG | HEIGHT: 67.75 IN | TEMPERATURE: 98 F

## 2019-05-04 DIAGNOSIS — E66.01 SEVERE OBESITY (BMI 35.0-39.9) WITH COMORBIDITY (HCC): ICD-10-CM

## 2019-05-04 DIAGNOSIS — Z12.5 SCREENING FOR PROSTATE CANCER: ICD-10-CM

## 2019-05-04 DIAGNOSIS — Z28.21 VACCINATION NOT CARRIED OUT BECAUSE OF PATIENT REFUSAL: ICD-10-CM

## 2019-05-04 DIAGNOSIS — R35.0 URINARY FREQUENCY: ICD-10-CM

## 2019-05-04 DIAGNOSIS — F17.200 TOBACCO DEPENDENCE: ICD-10-CM

## 2019-05-04 DIAGNOSIS — K21.9 GASTROESOPHAGEAL REFLUX DISEASE WITHOUT ESOPHAGITIS: ICD-10-CM

## 2019-05-04 DIAGNOSIS — E78.00 HYPERCHOLESTEREMIA: ICD-10-CM

## 2019-05-04 DIAGNOSIS — R06.83 SNORING: ICD-10-CM

## 2019-05-04 DIAGNOSIS — I48.92 ATRIAL FIBRILLATION AND FLUTTER (HCC): ICD-10-CM

## 2019-05-04 DIAGNOSIS — I10 ESSENTIAL HYPERTENSION: ICD-10-CM

## 2019-05-04 DIAGNOSIS — E11.9 TYPE 2 DIABETES MELLITUS WITHOUT COMPLICATION, WITHOUT LONG-TERM CURRENT USE OF INSULIN (HCC): Primary | ICD-10-CM

## 2019-05-04 DIAGNOSIS — M17.11 PRIMARY OSTEOARTHRITIS OF RIGHT KNEE: ICD-10-CM

## 2019-05-04 DIAGNOSIS — I48.91 ATRIAL FIBRILLATION AND FLUTTER (HCC): ICD-10-CM

## 2019-05-04 PROCEDURE — 80061 LIPID PANEL: CPT | Performed by: FAMILY MEDICINE

## 2019-05-04 PROCEDURE — 82043 UR ALBUMIN QUANTITATIVE: CPT | Performed by: FAMILY MEDICINE

## 2019-05-04 PROCEDURE — 83036 HEMOGLOBIN GLYCOSYLATED A1C: CPT | Performed by: FAMILY MEDICINE

## 2019-05-04 PROCEDURE — 81003 URINALYSIS AUTO W/O SCOPE: CPT | Performed by: FAMILY MEDICINE

## 2019-05-04 PROCEDURE — 82570 ASSAY OF URINE CREATININE: CPT | Performed by: FAMILY MEDICINE

## 2019-05-04 PROCEDURE — 84153 ASSAY OF PSA TOTAL: CPT | Performed by: FAMILY MEDICINE

## 2019-05-04 PROCEDURE — 80053 COMPREHEN METABOLIC PANEL: CPT | Performed by: FAMILY MEDICINE

## 2019-05-04 PROCEDURE — 36415 COLL VENOUS BLD VENIPUNCTURE: CPT | Performed by: FAMILY MEDICINE

## 2019-05-04 PROCEDURE — 99215 OFFICE O/P EST HI 40 MIN: CPT | Performed by: FAMILY MEDICINE

## 2019-05-04 NOTE — PROGRESS NOTES
HPI:    Patient ID: Philip Shah. Grupo Fair. is a 63year old male.     Patient presents with:  Diabetes  HTN  Lipids  Atrial Fibrillation  Pt reports he gets up freq, urgency x 3-4 yrs but significantly worse over the past year, patient reports large amount Age of Onset   • Cancer Father 79        PROSTATE CANCER   • Heart Disorder Father         TIA   • Psychiatric Mother         DEMENTIA   • Diabetes Mother    • Heart Disorder Mother         CAD   • Lipids Mother    • Hypertension Mother    • Lipids Brother with shallow hypopharynx   Neck: Normal range of motion. Neck supple. No JVD present. Cardiovascular: Normal rate. An irregularly irregular rhythm present. No murmur heard.   Pulses:       Carotid pulses are 2+ on the right side, and 2+ on the left side negative Negative/Trace mg/dL    Urobilinogen Urine 0.2 0.0 - 1.9 mg/dL    Nitrite Urine negative Negative    Leukocyte Esterase Urine negative Negative    APPEARANCE clear Clear    Color Urine yellow Yellow    Multistix Lot# [id number] [facility]    Multistix E frequency prior to scheduling a sleep study.   We will check fasting lipids, CMP, PSA, hemoglobin A1c and microalbumin screen and urine dip  Patient again counseled on smoking cessation strategies, support offered  Discussed importance of lower carbohydrate

## 2019-05-06 DIAGNOSIS — R35.0 URINARY FREQUENCY: Primary | ICD-10-CM

## 2019-05-06 DIAGNOSIS — Z79.899 ENCOUNTER FOR LONG-TERM (CURRENT) DRUG USE: ICD-10-CM

## 2019-05-06 DIAGNOSIS — Z12.5 PROSTATE CANCER SCREENING: ICD-10-CM

## 2019-05-06 DIAGNOSIS — E11.9 TYPE 2 DIABETES MELLITUS WITHOUT COMPLICATION, WITHOUT LONG-TERM CURRENT USE OF INSULIN (HCC): ICD-10-CM

## 2019-05-06 DIAGNOSIS — E78.00 HYPERCHOLESTEREMIA: ICD-10-CM

## 2019-05-06 RX ORDER — RIVAROXABAN 20 MG/1
TABLET, FILM COATED ORAL
Qty: 90 TABLET | Refills: 1 | Status: SHIPPED | OUTPATIENT
Start: 2019-05-06 | End: 2019-11-01

## 2019-05-06 RX ORDER — SIMVASTATIN 10 MG
10 TABLET ORAL NIGHTLY
Qty: 90 TABLET | Refills: 1 | Status: SHIPPED | OUTPATIENT
Start: 2019-05-06 | End: 2019-05-20

## 2019-05-07 RX ORDER — MELOXICAM 15 MG/1
TABLET ORAL
Qty: 90 TABLET | Refills: 0 | OUTPATIENT
Start: 2019-05-07

## 2019-05-07 NOTE — TELEPHONE ENCOUNTER
Last office visit 5-4-19  Last refill 2-11-19. Discontinued 5/4/19. Started on Diclofenac. Refill denied- not appropriate. none anticipated

## 2019-05-08 ENCOUNTER — TELEPHONE (OUTPATIENT)
Dept: FAMILY MEDICINE CLINIC | Facility: CLINIC | Age: 59
End: 2019-05-08

## 2019-05-08 NOTE — TELEPHONE ENCOUNTER
Wife asks if she is to call [facility] central scheduling to schedule the bladder u/s (post-void)?  Advised yes

## 2019-05-18 ENCOUNTER — HOSPITAL ENCOUNTER (OUTPATIENT)
Dept: ULTRASOUND IMAGING | Age: 59
Discharge: HOME | End: 2019-05-18
Attending: FAMILY MEDICINE
Payer: COMMERCIAL

## 2019-05-18 DIAGNOSIS — R35.0 URINARY FREQUENCY: ICD-10-CM

## 2019-05-18 PROCEDURE — 76857 US EXAM PELVIC LIMITED: CPT | Performed by: FAMILY MEDICINE

## 2019-05-20 DIAGNOSIS — E78.00 HYPERCHOLESTEREMIA: Primary | ICD-10-CM

## 2019-05-20 RX ORDER — ROSUVASTATIN CALCIUM 10 MG/1
10 TABLET, COATED ORAL NIGHTLY
Qty: 90 TABLET | Refills: 0 | Status: SHIPPED | OUTPATIENT
Start: 2019-05-20 | End: 2019-08-16

## 2019-05-21 ENCOUNTER — PATIENT OUTREACH (OUTPATIENT)
Dept: FAMILY MEDICINE CLINIC | Facility: CLINIC | Age: 59
End: 2019-05-21

## 2019-05-25 NOTE — TELEPHONE ENCOUNTER
Last refill #30 on 4/29/19  Last office visit on 5/4/19  Future Appointments   Date Time Provider Michi Estrella   8/3/2019  8:00 AM [facility] NURSE [facility] [place]

## 2019-05-28 RX ORDER — LANSOPRAZOLE 30 MG/1
CAPSULE, DELAYED RELEASE ORAL
Qty: 90 CAPSULE | Refills: 1 | Status: SHIPPED | OUTPATIENT
Start: 2019-05-28 | End: 2019-11-10

## 2019-05-30 ENCOUNTER — TELEPHONE (OUTPATIENT)
Dept: FAMILY MEDICINE CLINIC | Facility: CLINIC | Age: 59
End: 2019-05-30

## 2019-05-30 NOTE — TELEPHONE ENCOUNTER
Diclofenac Sodium 50 MG Oral Tab EC  90 DAY SUPPLY     WIFE SAYS THIS MEDICATION IS WORKING REALLY WELL FOR [facility].  PLEASE SEND TO SULEMA Dupree

## 2019-06-05 RX ORDER — DILTIAZEM HYDROCHLORIDE 240 MG/1
CAPSULE, COATED, EXTENDED RELEASE ORAL
Qty: 90 CAPSULE | Refills: 1 | Status: SHIPPED | OUTPATIENT
Start: 2019-06-05 | End: 2019-11-30

## 2019-08-03 ENCOUNTER — NURSE ONLY (OUTPATIENT)
Dept: FAMILY MEDICINE CLINIC | Facility: CLINIC | Age: 59
End: 2019-08-03
Payer: COMMERCIAL

## 2019-08-03 DIAGNOSIS — E78.00 HYPERCHOLESTEREMIA: ICD-10-CM

## 2019-08-03 DIAGNOSIS — E11.9 TYPE 2 DIABETES MELLITUS WITHOUT COMPLICATION, WITHOUT LONG-TERM CURRENT USE OF INSULIN (HCC): ICD-10-CM

## 2019-08-03 LAB
CHOLEST SMN-MCNC: 111 MG/DL (ref ?–200)
EST. AVERAGE GLUCOSE BLD GHB EST-MCNC: 131 MG/DL (ref 68–126)
HBA1C MFR BLD HPLC: 6.2 % (ref ?–5.7)
HDLC SERPL-MCNC: 25 MG/DL (ref 40–59)
LDLC SERPL CALC-MCNC: 66 MG/DL (ref ?–100)
NONHDLC SERPL-MCNC: 86 MG/DL (ref ?–130)
TRIGL SERPL-MCNC: 100 MG/DL (ref 30–149)
VLDLC SERPL CALC-MCNC: 20 MG/DL (ref 0–30)

## 2019-08-03 PROCEDURE — 36415 COLL VENOUS BLD VENIPUNCTURE: CPT | Performed by: FAMILY MEDICINE

## 2019-08-03 PROCEDURE — 83036 HEMOGLOBIN GLYCOSYLATED A1C: CPT | Performed by: FAMILY MEDICINE

## 2019-08-03 PROCEDURE — 80061 LIPID PANEL: CPT | Performed by: FAMILY MEDICINE

## 2019-08-05 DIAGNOSIS — E78.00 HYPERCHOLESTEREMIA: Primary | ICD-10-CM

## 2019-08-05 DIAGNOSIS — E11.9 TYPE 2 DIABETES MELLITUS WITHOUT COMPLICATION, WITHOUT LONG-TERM CURRENT USE OF INSULIN (HCC): ICD-10-CM

## 2019-08-16 RX ORDER — ROSUVASTATIN CALCIUM 10 MG/1
TABLET, COATED ORAL
Qty: 90 TABLET | Refills: 1 | Status: SHIPPED | OUTPATIENT
Start: 2019-08-16 | End: 2020-02-06

## 2019-10-05 ENCOUNTER — OFFICE VISIT (OUTPATIENT)
Dept: FAMILY MEDICINE CLINIC | Facility: CLINIC | Age: 59
End: 2019-10-05
Payer: COMMERCIAL

## 2019-10-05 VITALS
OXYGEN SATURATION: 97 % | SYSTOLIC BLOOD PRESSURE: 114 MMHG | RESPIRATION RATE: 12 BRPM | BODY MASS INDEX: 35.29 KG/M2 | HEART RATE: 88 BPM | WEIGHT: 238.25 LBS | DIASTOLIC BLOOD PRESSURE: 80 MMHG | HEIGHT: 69 IN | TEMPERATURE: 98 F

## 2019-10-05 DIAGNOSIS — F17.200 TOBACCO DEPENDENCE: ICD-10-CM

## 2019-10-05 DIAGNOSIS — I48.91 ATRIAL FIBRILLATION AND FLUTTER (HCC): ICD-10-CM

## 2019-10-05 DIAGNOSIS — E78.00 HYPERCHOLESTEREMIA: ICD-10-CM

## 2019-10-05 DIAGNOSIS — Z28.21 IMMUNIZATION NOT CARRIED OUT BECAUSE OF PATIENT REFUSAL: ICD-10-CM

## 2019-10-05 DIAGNOSIS — M19.041 PRIMARY OSTEOARTHRITIS OF BOTH HANDS: ICD-10-CM

## 2019-10-05 DIAGNOSIS — Z28.21 VACCINATION NOT CARRIED OUT BECAUSE OF PATIENT REFUSAL: ICD-10-CM

## 2019-10-05 DIAGNOSIS — R91.1 LUNG NODULE: ICD-10-CM

## 2019-10-05 DIAGNOSIS — R35.0 URINARY FREQUENCY: ICD-10-CM

## 2019-10-05 DIAGNOSIS — I10 ESSENTIAL HYPERTENSION: ICD-10-CM

## 2019-10-05 DIAGNOSIS — Z80.42 FAMILY HISTORY OF PROSTATE CANCER: ICD-10-CM

## 2019-10-05 DIAGNOSIS — Z00.00 PREVENTATIVE HEALTH CARE: Primary | ICD-10-CM

## 2019-10-05 DIAGNOSIS — E11.9 TYPE 2 DIABETES MELLITUS WITHOUT COMPLICATION, WITHOUT LONG-TERM CURRENT USE OF INSULIN (HCC): ICD-10-CM

## 2019-10-05 DIAGNOSIS — M17.11 PRIMARY OSTEOARTHRITIS OF RIGHT KNEE: ICD-10-CM

## 2019-10-05 DIAGNOSIS — E66.01 SEVERE OBESITY (BMI 35.0-39.9) WITH COMORBIDITY (HCC): ICD-10-CM

## 2019-10-05 DIAGNOSIS — R06.83 SNORING: ICD-10-CM

## 2019-10-05 DIAGNOSIS — M19.042 PRIMARY OSTEOARTHRITIS OF BOTH HANDS: ICD-10-CM

## 2019-10-05 DIAGNOSIS — I48.92 ATRIAL FIBRILLATION AND FLUTTER (HCC): ICD-10-CM

## 2019-10-05 PROCEDURE — 99396 PREV VISIT EST AGE 40-64: CPT | Performed by: FAMILY MEDICINE

## 2019-10-05 RX ORDER — OXYBUTYNIN CHLORIDE 5 MG/1
5 TABLET, EXTENDED RELEASE ORAL DAILY
Qty: 30 TABLET | Refills: 0 | Status: SHIPPED | OUTPATIENT
Start: 2019-10-05 | End: 2019-10-27

## 2019-10-05 NOTE — PATIENT INSTRUCTIONS
I reviewed the results of most recent labs  I reviewed goals for fasting and postmeal blood sugars as well as hemoglobin A1c, blood pressure lipids.   I reviewed conservative management of hypertension including sodium restriction, daily aerobic activity, a

## 2019-10-05 NOTE — H&P
Martine Monsivaiselle Shan. is a 63year old male who presents for a complete physical exam. Here with wife  HPI:   Pt voices concerns of urinary freq, pt felt due to simvastatin, changed to rosuvastatin . no difference,   PVR 10 cc    Wt Readings from Last 6 En DILTIAZEM HCL ER COATED BEADS 240 MG Oral Capsule SR 24 Hr TAKE 1 CAPSULE BY MOUTH EVERY DAY Disp: 90 capsule Rfl: 1   LANSOPRAZOLE 30 MG Oral Capsule Delayed Release TAKE 1 CAPSULE BY MOUTH EVERY DAY BEFORE BREAKFAST Disp: 90 capsule Rfl: 1   metFORMIN • Psychiatric Mother         DEMENTIA   • Diabetes Mother    • Heart Disorder Mother         CAD   • Lipids Mother    • Hypertension Mother    • Lipids Brother    • Diabetes Sister    • Diabetes Brother    • Hypertension Brother    • No Known Problems Si sleep  HEMATOLOGIC: denies unexplained bruising or bleeding  ENDOCRINE: denies heat or cold intolerance , no unexplained wt loss or gain, not checking bs  EXAM:   /80   Pulse 88   Temp 97.6 °F (36.4 °C) (Temporal)   Resp 12   Ht 69\"   Wt 238 lb 4 oz (hcc)  Hypercholesteremia  Atrial fibrillation and flutter (hcc)  Essential hypertension  Primary osteoarthritis of right knee  Tobacco dependence  Severe obesity (bmi 35.0-39. 9) with comorbidity (hcc)  Lung nodule- lll- stable since 2012 by serial ct scan Potential side effects discussed  Reviewed age-appropriate immunizations. Would recommend flu vaccine and pneumococcal vaccine however patient again refuses.   Patient be due for follow-up colonoscopy in 2023  Follow-up for fasting labs in February  We matilde

## 2019-10-14 ENCOUNTER — TELEPHONE (OUTPATIENT)
Dept: FAMILY MEDICINE CLINIC | Facility: CLINIC | Age: 59
End: 2019-10-14

## 2019-10-28 RX ORDER — OXYBUTYNIN CHLORIDE 10 MG/1
10 TABLET, EXTENDED RELEASE ORAL DAILY
Qty: 30 TABLET | Refills: 0 | COMMUNITY
Start: 2019-10-28 | End: 2019-11-20

## 2019-10-28 RX ORDER — OXYBUTYNIN CHLORIDE 5 MG/1
TABLET, EXTENDED RELEASE ORAL
Qty: 90 TABLET | Refills: 1 | Status: SHIPPED | OUTPATIENT
Start: 2019-10-28 | End: 2019-10-28 | Stop reason: DRUGHIGH

## 2019-10-28 NOTE — TELEPHONE ENCOUNTER
Meloxicam 15 MG Oral Tab   THIS MED IS WORKING,  BUT SHE WOULD LIKE TO TALK TO YOU ABOUT ANOTHER MEDICATION FOR ANTONIO. Sheath inserted in the left internal jugular vein.

## 2019-10-28 NOTE — TELEPHONE ENCOUNTER
Okay to increase oxybutynin extended release to 10 mg daily  CBD oil drops under the tongue would be okay

## 2019-10-28 NOTE — TELEPHONE ENCOUNTER
Spoke with patient's wife and she verbalized understanding. Verbal given to pharmacy for a quantity of 30 per patient request. Medication list updated.

## 2019-10-28 NOTE — TELEPHONE ENCOUNTER
Spoke with patient's wife who is requesting an increase in patient's Oxybutynin from 5mg to 10mg as patient states the 5mg is not helping. A script for 5mg was already sent in today, will call pharmacy and change dosage if ok by Dr. Sami Feliciano.  Wife also sta

## 2019-10-28 NOTE — TELEPHONE ENCOUNTER
metFORMIN HCl 500 MG Oral Tab and OXYBUTYNIN CHLORIDE ER  Oral Tablet 24 Hr [facility] [place]. Ceola Paget in wanting to see if he is able to change the dose of OXYBUTYNIN CHLORIDE ER from 5mg to 10mg?   Also Romulo Jefferson has some questions about some me

## 2019-11-01 RX ORDER — RIVAROXABAN 20 MG/1
TABLET, FILM COATED ORAL
Qty: 90 TABLET | Refills: 1 | Status: SHIPPED | OUTPATIENT
Start: 2019-11-01 | End: 2020-03-20

## 2019-11-11 RX ORDER — LANSOPRAZOLE 30 MG/1
CAPSULE, DELAYED RELEASE ORAL
Qty: 90 CAPSULE | Refills: 1 | Status: SHIPPED | OUTPATIENT
Start: 2019-11-11 | End: 2020-05-12

## 2019-11-20 RX ORDER — OXYBUTYNIN CHLORIDE 10 MG/1
TABLET, EXTENDED RELEASE ORAL
Qty: 90 TABLET | Refills: 1 | Status: SHIPPED | OUTPATIENT
Start: 2019-11-20 | End: 2020-03-20

## 2019-11-30 RX ORDER — DILTIAZEM HYDROCHLORIDE 240 MG/1
CAPSULE, COATED, EXTENDED RELEASE ORAL
Qty: 90 CAPSULE | Refills: 1 | Status: SHIPPED | OUTPATIENT
Start: 2019-11-30 | End: 2020-05-12

## 2020-02-06 RX ORDER — ROSUVASTATIN CALCIUM 10 MG/1
TABLET, COATED ORAL
Qty: 90 TABLET | Refills: 0 | Status: SHIPPED | OUTPATIENT
Start: 2020-02-06 | End: 2020-03-20

## 2020-02-06 NOTE — TELEPHONE ENCOUNTER
Last refill #90 x 1 on 8/16/19  Last office visit on 10/5/19  Future Appointments   Date Time Provider Michi Estrella   3/7/2020  8:00 AM [facility] NURSE [facility] [place]   4/18/2020  8:45 AM Joellen Bean., DO [facility] [place]     Refilled

## 2020-02-20 NOTE — TELEPHONE ENCOUNTER
Last office visit: 10/05/19  Last refill: 11/25/19  Labs Due: 2/5/2020  Future Appointments   Date Time Provider Michi Estrella   3/7/2020  8:00 AM [facility] NURSE [facility] [place]   4/18/2020  8:45 AM Michael Polo., DO [facility] [place]

## 2020-02-20 NOTE — TELEPHONE ENCOUNTER
Last office visit: 10/05/19  Last refill: 11/25/19  Labs Due: 2/5/2020  Future Appointments   Date Time Provider Michi Estrella   3/7/2020  8:00 AM [facility] NURSE [facility] [place]   4/18/2020  8:45 AM Ju Bauman., DO [facility] [place]

## 2020-03-07 ENCOUNTER — NURSE ONLY (OUTPATIENT)
Dept: FAMILY MEDICINE CLINIC | Facility: CLINIC | Age: 60
End: 2020-03-07
Payer: COMMERCIAL

## 2020-03-07 DIAGNOSIS — E11.9 TYPE 2 DIABETES MELLITUS WITHOUT COMPLICATION, WITHOUT LONG-TERM CURRENT USE OF INSULIN (HCC): ICD-10-CM

## 2020-03-07 DIAGNOSIS — Z12.5 PROSTATE CANCER SCREENING: ICD-10-CM

## 2020-03-07 DIAGNOSIS — E78.00 HYPERCHOLESTEREMIA: ICD-10-CM

## 2020-03-07 LAB
CHOLEST SMN-MCNC: 131 MG/DL (ref ?–200)
CREAT UR-SCNC: 89 MG/DL
EST. AVERAGE GLUCOSE BLD GHB EST-MCNC: 131 MG/DL (ref 68–126)
HBA1C MFR BLD HPLC: 6.2 % (ref ?–5.7)
HDLC SERPL-MCNC: 27 MG/DL (ref 40–59)
LDLC SERPL CALC-MCNC: 92 MG/DL (ref ?–100)
MICROALBUMIN UR-MCNC: 0.61 MG/DL
MICROALBUMIN/CREAT 24H UR-RTO: 6.9 UG/MG (ref ?–30)
NONHDLC SERPL-MCNC: 104 MG/DL (ref ?–130)
PATIENT FASTING Y/N/NP: YES
TRIGL SERPL-MCNC: 59 MG/DL (ref 30–149)
VLDLC SERPL CALC-MCNC: 12 MG/DL (ref 0–30)

## 2020-03-07 PROCEDURE — 83036 HEMOGLOBIN GLYCOSYLATED A1C: CPT | Performed by: FAMILY MEDICINE

## 2020-03-07 PROCEDURE — 82043 UR ALBUMIN QUANTITATIVE: CPT | Performed by: FAMILY MEDICINE

## 2020-03-07 PROCEDURE — 36415 COLL VENOUS BLD VENIPUNCTURE: CPT | Performed by: FAMILY MEDICINE

## 2020-03-07 PROCEDURE — 80061 LIPID PANEL: CPT | Performed by: FAMILY MEDICINE

## 2020-03-07 PROCEDURE — 82570 ASSAY OF URINE CREATININE: CPT | Performed by: FAMILY MEDICINE

## 2020-03-09 DIAGNOSIS — E11.9 TYPE 2 DIABETES MELLITUS WITHOUT COMPLICATION, WITHOUT LONG-TERM CURRENT USE OF INSULIN (HCC): ICD-10-CM

## 2020-03-09 DIAGNOSIS — E78.00 HYPERCHOLESTEREMIA: Primary | ICD-10-CM

## 2020-03-20 ENCOUNTER — TELEPHONE (OUTPATIENT)
Dept: FAMILY MEDICINE CLINIC | Facility: CLINIC | Age: 60
End: 2020-03-20

## 2020-03-20 RX ORDER — OXYBUTYNIN CHLORIDE 10 MG/1
10 TABLET, EXTENDED RELEASE ORAL
Qty: 90 TABLET | Refills: 0 | Status: SHIPPED | OUTPATIENT
Start: 2020-03-20 | End: 2020-06-29

## 2020-03-20 RX ORDER — ROSUVASTATIN CALCIUM 10 MG/1
10 TABLET, COATED ORAL NIGHTLY
Qty: 90 TABLET | Refills: 0 | Status: SHIPPED | OUTPATIENT
Start: 2020-03-20 | End: 2020-06-17

## 2020-03-20 NOTE — TELEPHONE ENCOUNTER
metFORMIN HCl 500 MG Oral Tab, XARELTO 20 MG Oral Tab, ROSUVASTATIN CALCIUM 10 MG Oral Tab and OXYBUTYNIN CHLORIDE ER 10 MG Oral Tablet 24 Hr [facility] [place] on [address]

## 2020-03-20 NOTE — TELEPHONE ENCOUNTER
Metformin HCL: 10/28/19 #90 w/ 1 refills  Xarelto: 11/1/19 #90 w/ 1 refills  Rosuvastatin: 2/6/20 #90 w/ 0 refills  Oxybutynin: 11/20/19 #90 w/ 1 refill    Last OV: 1/29/20  Last labs: 3/7/20    Future Appointments   Date Time Provider Michi Estrella

## 2020-04-24 NOTE — TELEPHONE ENCOUNTER
Last Office Visit: 10/5/2019  Last Refill: 3/20/2020, #90 no refill  Last Labs: 3/7/2020    Pharmacy switch.

## 2020-05-05 RX ORDER — RIVAROXABAN 20 MG/1
TABLET, FILM COATED ORAL
Qty: 90 TABLET | Refills: 1 | OUTPATIENT
Start: 2020-05-05

## 2020-05-08 NOTE — TELEPHONE ENCOUNTER
Last refill: 3/20/20 #90 w/ 0 refills  Last OV: 10/5/19    No future appointments. Left message for pt to call back to schedule appointment.

## 2020-05-12 ENCOUNTER — TELEPHONE (OUTPATIENT)
Dept: FAMILY MEDICINE CLINIC | Facility: CLINIC | Age: 60
End: 2020-05-12

## 2020-05-12 RX ORDER — LANSOPRAZOLE 30 MG/1
CAPSULE, DELAYED RELEASE ORAL
Qty: 90 CAPSULE | Refills: 1 | Status: SHIPPED | OUTPATIENT
Start: 2020-05-12 | End: 2020-05-13

## 2020-05-12 RX ORDER — DILTIAZEM HYDROCHLORIDE 240 MG/1
CAPSULE, COATED, EXTENDED RELEASE ORAL
Qty: 90 CAPSULE | Refills: 1 | Status: SHIPPED | OUTPATIENT
Start: 2020-05-12 | End: 2020-05-13

## 2020-05-12 NOTE — TELEPHONE ENCOUNTER
LOV:  10/5/2019     LAB:    3/7/2020     LRX:    DILTIAZEM HCL ER COATED BEADS 240 MG Oral Capsule SR 24 Hr 90 capsule 1 refill 11/30/2019  LANSOPRAZOLE 30 MG Oral Capsule Delayed Release 90 capsule 1 refill 11/11/2019      NOV:   No future appointments.

## 2020-05-12 NOTE — TELEPHONE ENCOUNTER
PT COMPLETED LABS 3/7/20    NEW rX SENT  Medication Dispense Information     ROSUVASTATIN CALCIUM  10 MG TABS   Dispensed: 3/20/2020 12:00 AM   Written:  3/20/2020   Days supply: 90   Quantity: [address]: [id number] [id number]

## 2020-05-13 ENCOUNTER — TELEPHONE (OUTPATIENT)
Dept: FAMILY MEDICINE CLINIC | Facility: CLINIC | Age: 60
End: 2020-05-13

## 2020-05-13 RX ORDER — ROSUVASTATIN CALCIUM 10 MG/1
TABLET, COATED ORAL
Qty: 90 TABLET | Refills: 0 | OUTPATIENT
Start: 2020-05-13

## 2020-05-13 RX ORDER — DILTIAZEM HYDROCHLORIDE 240 MG/1
240 CAPSULE, COATED, EXTENDED RELEASE ORAL DAILY
Qty: 90 CAPSULE | Refills: 0 | Status: SHIPPED | OUTPATIENT
Start: 2020-05-13 | End: 2020-08-13

## 2020-05-13 RX ORDER — LANSOPRAZOLE 30 MG/1
30 CAPSULE, DELAYED RELEASE ORAL
Qty: 90 CAPSULE | Refills: 0 | Status: SHIPPED | OUTPATIENT
Start: 2020-05-13 | End: 2020-08-04

## 2020-05-13 NOTE — TELEPHONE ENCOUNTER
Calling Carine Yepez back. Vern Kuo is out of state currently for work, but wife did schedule him a visit for June.       Future Appointments   Date Time Provider Michi Estrella   6/13/2020  8:45 AM Theodora Salvador., DO [facility] [place]       Pt needs his Dil

## 2020-05-13 NOTE — TELEPHONE ENCOUNTER
CALL WIFE, GOT NOTICE HE HAS REFILLS DUE AT PHARMACY, BUT MORE INFO OR SOMETHING IS NEEDED FROM OUR OFFICE?

## 2020-05-13 NOTE — TELEPHONE ENCOUNTER
I spoke with wife today about meds. He does not need the cholesterol med until next month. I denied that. But I sent diltiazem, lansoprazole, and diclofenac to Dr. Ramana Bullock to sign off on and send to a different pharmacy than sent to yesterday.

## 2020-06-15 NOTE — TELEPHONE ENCOUNTER
Last Office Visit: 10/5/19, had appt 6/13 but was out of state so he cancelled. Did not reschedule  Last Refill: 3/20/20  Last Labs: 3/7/20, due for repeat 9/2020    Needs to reschedule appt. Left message for patient to call back.

## 2020-06-17 RX ORDER — ROSUVASTATIN CALCIUM 10 MG/1
TABLET, COATED ORAL
Qty: 30 TABLET | Refills: 0 | Status: SHIPPED | OUTPATIENT
Start: 2020-06-17 | End: 2020-09-10

## 2020-06-17 RX ORDER — RIVAROXABAN 20 MG/1
TABLET, FILM COATED ORAL
Qty: 30 TABLET | Refills: 0 | Status: SHIPPED | OUTPATIENT
Start: 2020-06-17 | End: 2020-09-09

## 2020-06-17 NOTE — TELEPHONE ENCOUNTER
Pt is out of state working, Arianne Vasquez is getting multiple notifications from the pharmacy he has refills to be approved. Will [facility] fill? Abdon Mcmahan said she will make sure he schedules a appt when he gets back into town.

## 2020-06-27 NOTE — TELEPHONE ENCOUNTER
Last refill: 03/20/20  QtY: 90  W/ 0 refills  Last ov: 10/05/19    Requested Prescriptions     Pending Prescriptions Disp Refills   • OXYBUTYNIN CHLORIDE ER 10 MG Oral Tablet 24 Hr [Pharmacy Med Name: OXYBUTYNIN CL ER 10 MG TABLET] 90 tablet 0     Sig: PETRA

## 2020-06-29 RX ORDER — OXYBUTYNIN CHLORIDE 10 MG/1
TABLET, EXTENDED RELEASE ORAL
Qty: 90 TABLET | Refills: 0 | Status: SHIPPED | OUTPATIENT
Start: 2020-06-29 | End: 2020-09-24

## 2020-07-18 ENCOUNTER — OFFICE VISIT (OUTPATIENT)
Dept: FAMILY MEDICINE CLINIC | Facility: CLINIC | Age: 60
End: 2020-07-18
Payer: COMMERCIAL

## 2020-07-18 VITALS
BODY MASS INDEX: 34 KG/M2 | DIASTOLIC BLOOD PRESSURE: 80 MMHG | RESPIRATION RATE: 16 BRPM | WEIGHT: 237 LBS | TEMPERATURE: 98 F | HEART RATE: 76 BPM | SYSTOLIC BLOOD PRESSURE: 114 MMHG

## 2020-07-18 DIAGNOSIS — I10 ESSENTIAL HYPERTENSION: ICD-10-CM

## 2020-07-18 DIAGNOSIS — G56.02 CARPAL TUNNEL SYNDROME OF LEFT WRIST: ICD-10-CM

## 2020-07-18 DIAGNOSIS — G54.0 THORACIC OUTLET SYNDROME: ICD-10-CM

## 2020-07-18 DIAGNOSIS — R35.0 URINARY FREQUENCY: ICD-10-CM

## 2020-07-18 DIAGNOSIS — I48.92 ATRIAL FIBRILLATION AND FLUTTER (HCC): ICD-10-CM

## 2020-07-18 DIAGNOSIS — K21.9 GASTROESOPHAGEAL REFLUX DISEASE WITHOUT ESOPHAGITIS: ICD-10-CM

## 2020-07-18 DIAGNOSIS — I73.00 RAYNAUD'S PHENOMENON WITHOUT GANGRENE: ICD-10-CM

## 2020-07-18 DIAGNOSIS — E11.9 TYPE 2 DIABETES MELLITUS WITHOUT COMPLICATION, WITHOUT LONG-TERM CURRENT USE OF INSULIN (HCC): Primary | ICD-10-CM

## 2020-07-18 DIAGNOSIS — I48.91 ATRIAL FIBRILLATION AND FLUTTER (HCC): ICD-10-CM

## 2020-07-18 DIAGNOSIS — F17.200 TOBACCO DEPENDENCE: ICD-10-CM

## 2020-07-18 DIAGNOSIS — G56.22 ULNAR NEUROPATHY AT ELBOW OF LEFT UPPER EXTREMITY: ICD-10-CM

## 2020-07-18 DIAGNOSIS — E78.00 HYPERCHOLESTEREMIA: ICD-10-CM

## 2020-07-18 DIAGNOSIS — G25.0 ESSENTIAL TREMOR: ICD-10-CM

## 2020-07-18 DIAGNOSIS — E66.01 SEVERE OBESITY (BMI 35.0-39.9) WITH COMORBIDITY (HCC): ICD-10-CM

## 2020-07-18 PROCEDURE — 99215 OFFICE O/P EST HI 40 MIN: CPT | Performed by: FAMILY MEDICINE

## 2020-07-18 PROCEDURE — 3074F SYST BP LT 130 MM HG: CPT | Performed by: FAMILY MEDICINE

## 2020-07-18 PROCEDURE — 3079F DIAST BP 80-89 MM HG: CPT | Performed by: FAMILY MEDICINE

## 2020-07-18 RX ORDER — METHYLPREDNISOLONE 4 MG/1
TABLET ORAL
Qty: 1 KIT | Refills: 0 | Status: SHIPPED | OUTPATIENT
Start: 2020-07-18 | End: 2020-08-31 | Stop reason: ALTCHOICE

## 2020-07-18 RX ORDER — OXYBUTYNIN CHLORIDE 5 MG/1
5 TABLET, EXTENDED RELEASE ORAL DAILY
Qty: 30 TABLET | Refills: 0 | Status: SHIPPED | OUTPATIENT
Start: 2020-07-18 | End: 2020-08-31 | Stop reason: DRUGHIGH

## 2020-07-18 NOTE — PATIENT INSTRUCTIONS
1. Type 2 diabetes mellitus without complication, without long-term current use of insulin ([facility] [address]  I reviewed goals for fasting and postmeal blood sugars as well as hemoglobin A1c. We will repeat labs in September  Discussed age-appropriate immunizations. for carpal tunnel. Will prescribe Medrol Dosepak and physical therapy. If symptoms persist, consider orthopedic evaluation    12. Raynaud's phenomenon without gangrene  Discussed vasospastic etiology of hand changes.   Discussed the relationship of monica

## 2020-07-18 NOTE — PROGRESS NOTES
HPI:    Patient ID: Shelton Walton. is a 62year old male.     Patient presents with:  Diabetes  HTN  Lipids  Atrial Fibrillation  Medication Follow-Up  Gastro-esophageal Reflux    Pt rarely checking bp  Wt down 7# since last visit  Energy low  Pt ra directed. 1 kit 0   • Oxybutynin Chloride ER 5 MG Oral Tablet 24 Hr Take 1 tablet (5 mg total) by mouth daily.  30 tablet 0   • OXYBUTYNIN CHLORIDE ER 10 MG Oral Tablet 24 Hr TAKE 1 TABLET BY MOUTH EVERY DAY 90 tablet 0   • ROSUVASTATIN CALCIUM 10 MG Oral T Bowel sounds are normal. There is no tenderness. Musculoskeletal:      Right hand: He exhibits normal range of motion and normal capillary refill. Normal sensation noted. Decreased strength noted. Left hand: He exhibits normal capillary refill.  Norm tunnel syndrome of left wrist  Raynaud's phenomenon without gangrene  Ulnar neuropathy at elbow of left upper extremity  Patient Instructions   1.  Type 2 diabetes mellitus without complication, without long-term current use of insulin ([facility] 75.)  I reviewed Becka options, patient denies any need for additional medication at this time    6. Carpal tunnel syndrome of left wrist  Discussed contributing factors for carpal tunnel. Will prescribe Medrol Dosepak and physical therapy.   If symptoms persist, consider ortho

## 2020-08-04 RX ORDER — LANSOPRAZOLE 30 MG/1
CAPSULE, DELAYED RELEASE ORAL
Qty: 90 CAPSULE | Refills: 0 | Status: SHIPPED | OUTPATIENT
Start: 2020-08-04 | End: 2020-10-29

## 2020-08-12 NOTE — TELEPHONE ENCOUNTER
Diltiazem: 5/13/20 #90 w/ 0 refills  Hypertension Medications Protocol Failed  Diclofenac: 5/13/20 #180 w/ 0 refills    Last OV: 7/18/20  Last labs: 3/7/20    No future appointments.

## 2020-08-13 RX ORDER — DILTIAZEM HYDROCHLORIDE 240 MG/1
CAPSULE, COATED, EXTENDED RELEASE ORAL
Qty: 90 CAPSULE | Refills: 0 | Status: SHIPPED | OUTPATIENT
Start: 2020-08-13 | End: 2020-11-09

## 2020-08-14 RX ORDER — OXYBUTYNIN CHLORIDE 5 MG/1
TABLET, EXTENDED RELEASE ORAL
Qty: 30 TABLET | Refills: 0 | OUTPATIENT
Start: 2020-08-14

## 2020-08-24 ENCOUNTER — TELEMEDICINE (OUTPATIENT)
Dept: FAMILY MEDICINE CLINIC | Facility: CLINIC | Age: 60
End: 2020-08-24
Payer: COMMERCIAL

## 2020-08-24 ENCOUNTER — TELEPHONE (OUTPATIENT)
Dept: FAMILY MEDICINE CLINIC | Facility: CLINIC | Age: 60
End: 2020-08-24

## 2020-08-24 DIAGNOSIS — Z20.822 SUSPECTED COVID-19 VIRUS INFECTION: Primary | ICD-10-CM

## 2020-08-24 PROCEDURE — 99214 OFFICE O/P EST MOD 30 MIN: CPT | Performed by: INTERNAL MEDICINE

## 2020-08-24 RX ORDER — ALBUTEROL SULFATE 90 UG/1
2 AEROSOL, METERED RESPIRATORY (INHALATION) EVERY 6 HOURS PRN
Qty: 1 INHALER | Refills: 0 | Status: SHIPPED | OUTPATIENT
Start: 2020-08-24 | End: 2020-08-31

## 2020-08-24 RX ORDER — PREDNISONE 20 MG/1
TABLET ORAL
Qty: 21 TABLET | Refills: 0 | Status: SHIPPED | OUTPATIENT
Start: 2020-08-24 | End: 2020-08-31

## 2020-08-24 NOTE — TELEPHONE ENCOUNTER
Spouse called and states that patient is not feeling well. Sx are Cough, fever, etc. Offered a telephone or video visit with another MD and patient refused and wanted to speak with a nurse. Thinks it is from his smoking and bronchitis?

## 2020-08-24 NOTE — TELEPHONE ENCOUNTER
Spoke with pt and his wife. States he has had a cough x1week, was off work several days last week. Had a fever x2days, along with h/a. Afebrile now, but still coughing a lot.     Doximity appt scheduled today with Dr. Alysa Steinberg for eval

## 2020-08-24 NOTE — PROGRESS NOTES
This visit was conducted via video with audio and visual components during the COVID 19 pandemic due to shelter at home rules. Pt accepted responsibility for payment and video conducted in [place]. HPI:   Junior Shepherd.  Hanny Dearkiesha Alva. is a 62year old male who prese Accu-Chek Softclix Lancets Does not apply Misc As directed-----to check blood glucose once daily 100 each 12   • EPINEPHrine (EPIPEN 2-ALEXEY) 0.3 MG/0.3ML Injection Device Inject 0.3 mL as directed as needed.  (Patient not taking: Reported on 7/18/2020 ) 2 Meghann Crystal headaches    EXAM:   There were no vitals taken for this visit.   GENERAL: well developed, well nourished,in no apparent distress  SKIN: no rashes,no suspicious lesions  EYES:PERRLA, EOMI, normal optic disk,conjunctiva are clear  HEENT: atraumatic, normocep

## 2020-08-25 ENCOUNTER — APPOINTMENT (OUTPATIENT)
Dept: LAB | Age: 60
End: 2020-08-25
Attending: INTERNAL MEDICINE
Payer: COMMERCIAL

## 2020-08-25 DIAGNOSIS — Z20.822 SUSPECTED COVID-19 VIRUS INFECTION: ICD-10-CM

## 2020-08-26 ENCOUNTER — TELEPHONE (OUTPATIENT)
Dept: FAMILY MEDICINE CLINIC | Facility: CLINIC | Age: 60
End: 2020-08-26

## 2020-08-27 LAB — SARS-COV-2 RNA RESP QL NAA+PROBE: NOT DETECTED

## 2020-08-29 ENCOUNTER — TELEPHONE (OUTPATIENT)
Dept: FAMILY MEDICINE CLINIC | Facility: CLINIC | Age: 60
End: 2020-08-29

## 2020-08-29 NOTE — TELEPHONE ENCOUNTER
Spoke with pt and his wife. Pt had a Doximity appt with Dr. Funmilayo Cervantes on 8/24 for a cough. Covid testing negative. Pt was prescribed a prednisone taper x9 days and Albuterol inhaler prn.   States pt \"hasn't been feeling right\" in general since starting pred

## 2020-08-31 ENCOUNTER — OFFICE VISIT (OUTPATIENT)
Dept: FAMILY MEDICINE CLINIC | Facility: CLINIC | Age: 60
End: 2020-08-31
Payer: COMMERCIAL

## 2020-08-31 VITALS
OXYGEN SATURATION: 97 % | BODY MASS INDEX: 34.65 KG/M2 | WEIGHT: 242 LBS | DIASTOLIC BLOOD PRESSURE: 80 MMHG | HEART RATE: 91 BPM | SYSTOLIC BLOOD PRESSURE: 134 MMHG | TEMPERATURE: 98 F | HEIGHT: 70 IN

## 2020-08-31 DIAGNOSIS — I48.92 ATRIAL FIBRILLATION AND FLUTTER (HCC): ICD-10-CM

## 2020-08-31 DIAGNOSIS — Z20.822 SUSPECTED COVID-19 VIRUS INFECTION: Primary | ICD-10-CM

## 2020-08-31 DIAGNOSIS — J02.8 PHARYNGITIS DUE TO OTHER ORGANISM: ICD-10-CM

## 2020-08-31 DIAGNOSIS — I10 ESSENTIAL HYPERTENSION: ICD-10-CM

## 2020-08-31 DIAGNOSIS — I48.91 ATRIAL FIBRILLATION AND FLUTTER (HCC): ICD-10-CM

## 2020-08-31 DIAGNOSIS — J18.9 PNEUMONIA OF BOTH LOWER LOBES DUE TO INFECTIOUS ORGANISM: ICD-10-CM

## 2020-08-31 DIAGNOSIS — E11.9 TYPE 2 DIABETES MELLITUS WITHOUT COMPLICATION, WITHOUT LONG-TERM CURRENT USE OF INSULIN (HCC): ICD-10-CM

## 2020-08-31 PROCEDURE — 99213 OFFICE O/P EST LOW 20 MIN: CPT | Performed by: FAMILY MEDICINE

## 2020-08-31 PROCEDURE — 3075F SYST BP GE 130 - 139MM HG: CPT | Performed by: FAMILY MEDICINE

## 2020-08-31 PROCEDURE — 3079F DIAST BP 80-89 MM HG: CPT | Performed by: FAMILY MEDICINE

## 2020-08-31 PROCEDURE — 3008F BODY MASS INDEX DOCD: CPT | Performed by: FAMILY MEDICINE

## 2020-08-31 RX ORDER — AZITHROMYCIN 250 MG/1
TABLET, FILM COATED ORAL
Qty: 6 TABLET | Refills: 0 | Status: SHIPPED | OUTPATIENT
Start: 2020-08-31 | End: 2020-09-05

## 2020-08-31 NOTE — PROGRESS NOTES
HPI:    Patient ID: Deepak Polanco. Hanny Daniels is a 62year old male.     Patient presents with:  Cough: f/u from 8/24/20 phone Anil polk    Patient was evaluated by telemedicine on 8/24 for a cough  8/18/20 started w/ cough and \"fever\", sweats, chills day, THEN 1 tablet (250 mg total) daily for 4 days.  6 tablet 0   • DICLOFENAC SODIUM 50 MG Oral Tab EC TAKE 1 TABLET BY MOUTH TWICE A  tablet 0   • DILTIAZEM HCL ER COATED BEADS 240 MG Oral Capsule SR 24 Hr TAKE 1 CAPSULE BY MOUTH EVERY DAY 90 capsu accessory muscle usage. No tachypnea. No respiratory distress. He has no decreased breath sounds. He has no wheezes. He has no rhonchi. He has rales in the right lower field and the left lower field.    Neurological: He is alert and oriented to person, plac

## 2020-08-31 NOTE — PATIENT INSTRUCTIONS
I reviewed results of the coronavirus test  I discussed signs and symptoms of coronavirus infection. I suspect, that despite the negative result, he probably had COVID-19.   As symptoms are improving with the exception of a dry cough that is improving, I w

## 2020-09-09 RX ORDER — RIVAROXABAN 20 MG/1
TABLET, FILM COATED ORAL
Qty: 90 TABLET | Refills: 0 | Status: SHIPPED | OUTPATIENT
Start: 2020-09-09 | End: 2020-12-18

## 2020-09-10 ENCOUNTER — TELEPHONE (OUTPATIENT)
Dept: FAMILY MEDICINE CLINIC | Facility: CLINIC | Age: 60
End: 2020-09-10

## 2020-09-10 RX ORDER — ROSUVASTATIN CALCIUM 10 MG/1
10 TABLET, COATED ORAL NIGHTLY
Qty: 90 TABLET | Refills: 0 | Status: SHIPPED | OUTPATIENT
Start: 2020-09-10 | End: 2020-12-18

## 2020-09-10 RX ORDER — ROSUVASTATIN CALCIUM 10 MG/1
10 TABLET, COATED ORAL NIGHTLY
Qty: 30 TABLET | Refills: 0 | Status: SHIPPED | OUTPATIENT
Start: 2020-09-10 | End: 2020-09-10

## 2020-09-10 NOTE — TELEPHONE ENCOUNTER
Last refill #30 on 6/17/2020  Last office visit on 8/31/2020  No future appointments. Patient is due for labs this month. Reminder letter sent.

## 2020-09-14 ENCOUNTER — TELEPHONE (OUTPATIENT)
Dept: FAMILY MEDICINE CLINIC | Facility: CLINIC | Age: 60
End: 2020-09-14

## 2020-09-14 NOTE — TELEPHONE ENCOUNTER
When he needs refills she is asking for them to be sent to [facility] in [place] on [address]

## 2020-09-24 RX ORDER — OXYBUTYNIN CHLORIDE 10 MG/1
TABLET, EXTENDED RELEASE ORAL
Qty: 90 TABLET | Refills: 0 | Status: SHIPPED | OUTPATIENT
Start: 2020-09-24 | End: 2020-10-29 | Stop reason: ALTCHOICE

## 2020-09-24 NOTE — TELEPHONE ENCOUNTER
Last refill: 06/29/20  Qty: 90  W/ 0 refills  Last ov: 08/31/20    Requested Prescriptions     Pending Prescriptions Disp Refills   • OXYBUTYNIN CHLORIDE ER 10 MG Oral Tablet 24 Hr [Pharmacy Med Name: OXYBUTYNIN CL ER 10 MG TABLET] 90 tablet 0     Sig: PETRA

## 2020-09-28 ENCOUNTER — TELEPHONE (OUTPATIENT)
Dept: FAMILY MEDICINE CLINIC | Facility: CLINIC | Age: 60
End: 2020-09-28

## 2020-10-29 ENCOUNTER — LAB ENCOUNTER (OUTPATIENT)
Dept: LAB | Age: 60
End: 2020-10-29
Attending: FAMILY MEDICINE
Payer: COMMERCIAL

## 2020-10-29 ENCOUNTER — OFFICE VISIT (OUTPATIENT)
Dept: FAMILY MEDICINE CLINIC | Facility: CLINIC | Age: 60
End: 2020-10-29
Payer: COMMERCIAL

## 2020-10-29 VITALS
DIASTOLIC BLOOD PRESSURE: 84 MMHG | RESPIRATION RATE: 12 BRPM | TEMPERATURE: 97 F | HEART RATE: 65 BPM | WEIGHT: 245.38 LBS | SYSTOLIC BLOOD PRESSURE: 128 MMHG | HEIGHT: 69 IN | BODY MASS INDEX: 36.34 KG/M2

## 2020-10-29 DIAGNOSIS — E78.00 HYPERCHOLESTEREMIA: ICD-10-CM

## 2020-10-29 DIAGNOSIS — Z87.891 PERSONAL HISTORY OF TOBACCO USE, PRESENTING HAZARDS TO HEALTH: ICD-10-CM

## 2020-10-29 DIAGNOSIS — I10 ESSENTIAL HYPERTENSION: ICD-10-CM

## 2020-10-29 DIAGNOSIS — G25.0 ESSENTIAL TREMOR: ICD-10-CM

## 2020-10-29 DIAGNOSIS — R31.29 MICROSCOPIC HEMATURIA: ICD-10-CM

## 2020-10-29 DIAGNOSIS — M17.11 PRIMARY OSTEOARTHRITIS OF RIGHT KNEE: ICD-10-CM

## 2020-10-29 DIAGNOSIS — E11.9 TYPE 2 DIABETES MELLITUS WITHOUT COMPLICATION, WITHOUT LONG-TERM CURRENT USE OF INSULIN (HCC): ICD-10-CM

## 2020-10-29 DIAGNOSIS — R39.15 URINARY URGENCY: ICD-10-CM

## 2020-10-29 DIAGNOSIS — R91.1 LUNG NODULE: ICD-10-CM

## 2020-10-29 DIAGNOSIS — M19.041 PRIMARY OSTEOARTHRITIS OF BOTH HANDS: ICD-10-CM

## 2020-10-29 DIAGNOSIS — K21.9 GASTROESOPHAGEAL REFLUX DISEASE WITHOUT ESOPHAGITIS: ICD-10-CM

## 2020-10-29 DIAGNOSIS — I48.91 ATRIAL FIBRILLATION AND FLUTTER (HCC): ICD-10-CM

## 2020-10-29 DIAGNOSIS — I73.00 RAYNAUD'S PHENOMENON WITHOUT GANGRENE: ICD-10-CM

## 2020-10-29 DIAGNOSIS — F17.200 TOBACCO DEPENDENCE: ICD-10-CM

## 2020-10-29 DIAGNOSIS — Z80.42 FAMILY HISTORY OF PROSTATE CANCER: ICD-10-CM

## 2020-10-29 DIAGNOSIS — I48.92 ATRIAL FIBRILLATION AND FLUTTER (HCC): ICD-10-CM

## 2020-10-29 DIAGNOSIS — E66.01 SEVERE OBESITY (BMI 35.0-39.9) WITH COMORBIDITY (HCC): ICD-10-CM

## 2020-10-29 DIAGNOSIS — M19.042 PRIMARY OSTEOARTHRITIS OF BOTH HANDS: ICD-10-CM

## 2020-10-29 DIAGNOSIS — Z00.00 PREVENTATIVE HEALTH CARE: Primary | ICD-10-CM

## 2020-10-29 PROCEDURE — 3074F SYST BP LT 130 MM HG: CPT | Performed by: FAMILY MEDICINE

## 2020-10-29 PROCEDURE — 3079F DIAST BP 80-89 MM HG: CPT | Performed by: FAMILY MEDICINE

## 2020-10-29 PROCEDURE — 36415 COLL VENOUS BLD VENIPUNCTURE: CPT | Performed by: FAMILY MEDICINE

## 2020-10-29 PROCEDURE — 99396 PREV VISIT EST AGE 40-64: CPT | Performed by: FAMILY MEDICINE

## 2020-10-29 PROCEDURE — 87086 URINE CULTURE/COLONY COUNT: CPT | Performed by: FAMILY MEDICINE

## 2020-10-29 PROCEDURE — 81003 URINALYSIS AUTO W/O SCOPE: CPT | Performed by: FAMILY MEDICINE

## 2020-10-29 PROCEDURE — 83036 HEMOGLOBIN GLYCOSYLATED A1C: CPT | Performed by: FAMILY MEDICINE

## 2020-10-29 PROCEDURE — 80053 COMPREHEN METABOLIC PANEL: CPT | Performed by: FAMILY MEDICINE

## 2020-10-29 PROCEDURE — 3008F BODY MASS INDEX DOCD: CPT | Performed by: FAMILY MEDICINE

## 2020-10-29 PROCEDURE — 80061 LIPID PANEL: CPT | Performed by: FAMILY MEDICINE

## 2020-10-29 RX ORDER — LANSOPRAZOLE 30 MG/1
CAPSULE, DELAYED RELEASE ORAL
Qty: 90 CAPSULE | Refills: 0 | OUTPATIENT
Start: 2020-10-29

## 2020-10-29 RX ORDER — SOLIFENACIN SUCCINATE 5 MG/1
5 TABLET, FILM COATED ORAL DAILY
Qty: 90 TABLET | Refills: 0 | Status: SHIPPED | OUTPATIENT
Start: 2020-10-29 | End: 2021-01-22

## 2020-10-29 RX ORDER — LISINOPRIL 5 MG/1
5 TABLET ORAL DAILY
Qty: 90 TABLET | Refills: 1 | Status: SHIPPED | OUTPATIENT
Start: 2020-10-29 | End: 2021-04-19

## 2020-10-29 RX ORDER — LANSOPRAZOLE 30 MG/1
30 CAPSULE, DELAYED RELEASE ORAL
Qty: 90 CAPSULE | Refills: 3 | Status: SHIPPED | OUTPATIENT
Start: 2020-10-29 | End: 2021-11-15

## 2020-10-29 NOTE — H&P
Siva Flowers. Roberth Razo. is a 62year old male who presents for a complete physical exam.   HPI:   Pt voices concerns of freq urination.  Blood in urine at DOT px    Wt Readings from Last 6 Encounters:  10/29/20 : 245 lb 6 oz (111.3 kg)  08/31/20 : 242 lb (10 TABLET BY MOUTH EVERY DAY WITH BREAKFAST 90 tablet 0   • Rosuvastatin Calcium 10 MG Oral Tab Take 1 tablet (10 mg total) by mouth nightly.  DUE FOR  LABS PRIOR TO NEXT REFILL 90 tablet 0   • XARELTO 20 MG Oral Tab TAKE 1 TABLET BY MOUTH EVERY DAY 90 tablet PROSTATE CANCER   • Heart Disorder Father         TIA   • Psychiatric Mother         DEMENTIA   • Diabetes Mother    • Heart Disorder Mother         CAD   • Lipids Mother    • Hypertension Mother    • Lipids Brother    • Diabetes Sister    • Diabetes B stiffness in hands–Diclofenac , pt had injections ~ 8 months ago, helpful   NEURO: denies headaches, tremors, dizziness, numbness and weakness  PSYCHE: denies depression or anxiety, +disrupted sleep  HEMATOLOGIC: denies unexplained bruising or bleeding  EN appropriate  ASSESSMENT AND PLAN:   Devaughn Salter.  Leena Panchalker. is a 62year old male  Preventative health care  (primary encounter diagnosis)  Type 2 diabetes mellitus without complication, without long-term current use of insulin ([facility])  Essential hypertension routine foot exams and good fitting footwear. - VENIPUNCTURE  - HEMOGLOBIN A1C; Future    3.  Essential hypertension  I reviewed goals for blood pressure as well as conservative management of hypertension including sodium restriction, daily aerobic activit monitor clinically    14. Raynaud's phenomenon without gangrene  Discussed importance of smoking cessation    15. Personal history of tobacco use, presenting hazards to health  Discussed increased risk of lung cancer.   Discussed lung cancer screening CT sc

## 2020-10-29 NOTE — PATIENT INSTRUCTIONS
1. Preventative health care  I reviewed age-appropriate clinical screen exams as well as immunizations. Patient declines flu vaccine and pneumococcal vaccine    2.  Type 2 diabetes mellitus without complication, without long-term current use of insulin (HC carbohydrate food choices, eating behavior modification and aerobic activity with a goal weight reduction    10. Primary osteoarthritis of right knee  Continue anti-inflammatories and monitor clinically. Follow-up with orthopedics as needed    11.  Lung no

## 2020-11-04 DIAGNOSIS — E78.00 HYPERCHOLESTEREMIA: ICD-10-CM

## 2020-11-04 DIAGNOSIS — E11.9 TYPE 2 DIABETES MELLITUS WITHOUT COMPLICATION, WITHOUT LONG-TERM CURRENT USE OF INSULIN (HCC): Primary | ICD-10-CM

## 2020-11-04 DIAGNOSIS — I10 ESSENTIAL HYPERTENSION: ICD-10-CM

## 2020-11-04 DIAGNOSIS — Z12.5 SCREENING FOR PROSTATE CANCER: ICD-10-CM

## 2020-11-04 DIAGNOSIS — Z79.899 ENCOUNTER FOR LONG-TERM (CURRENT) DRUG USE: ICD-10-CM

## 2020-11-08 ENCOUNTER — HOSPITAL ENCOUNTER (OUTPATIENT)
Dept: CT IMAGING | Age: 60
Discharge: HOME | End: 2020-11-08
Attending: FAMILY MEDICINE
Payer: COMMERCIAL

## 2020-11-08 DIAGNOSIS — F17.200 TOBACCO DEPENDENCE: ICD-10-CM

## 2020-11-08 DIAGNOSIS — Z87.891 PERSONAL HISTORY OF TOBACCO USE, PRESENTING HAZARDS TO HEALTH: ICD-10-CM

## 2020-11-09 RX ORDER — DILTIAZEM HYDROCHLORIDE 240 MG/1
CAPSULE, COATED, EXTENDED RELEASE ORAL
Qty: 90 CAPSULE | Refills: 0 | Status: SHIPPED | OUTPATIENT
Start: 2020-11-09 | End: 2021-02-01

## 2020-11-11 ENCOUNTER — TELEPHONE (OUTPATIENT)
Dept: FAMILY MEDICINE CLINIC | Facility: CLINIC | Age: 60
End: 2020-11-11

## 2020-11-11 NOTE — TELEPHONE ENCOUNTER
Is it appropriate to take the metformin bid instead of 2 qam? Advised ok to take bid, she stated that Carlos Mauro prefers to split it up, we discussed the possibility of ordering 1000 mg tablets but Carlos Mauro prefers \"to split it up\".  She found a bottle of 90 metformi

## 2020-12-17 NOTE — TELEPHONE ENCOUNTER
Last refill on both meds #90 on 9/9/2020  Last office visit pertaining to refilsl on 10/29/2020 - cpx  No future appointments.   Labs current until 3/2021

## 2020-12-18 RX ORDER — ROSUVASTATIN CALCIUM 10 MG/1
10 TABLET, COATED ORAL NIGHTLY
Qty: 90 TABLET | Refills: 1 | Status: SHIPPED | OUTPATIENT
Start: 2020-12-18 | End: 2021-04-19

## 2020-12-31 NOTE — TELEPHONE ENCOUNTER
Medication was discontinued on 10/29/2020  Octmami message sent to patient asking if he does need a refill on medication.

## 2021-01-02 RX ORDER — OXYBUTYNIN CHLORIDE 10 MG/1
TABLET, EXTENDED RELEASE ORAL
Qty: 90 TABLET | Refills: 0 | OUTPATIENT
Start: 2021-01-02

## 2021-01-22 RX ORDER — SOLIFENACIN SUCCINATE 5 MG/1
TABLET, FILM COATED ORAL
Qty: 90 TABLET | Refills: 0 | Status: SHIPPED | OUTPATIENT
Start: 2021-01-22 | End: 2021-04-19

## 2021-01-22 NOTE — TELEPHONE ENCOUNTER
Last office visit: 10/29/20  Last refill: 10/29/20  Labs Due: 5/4/21  No future appointments.   Name from pharmacy: SOLIFENACIN 5 MG TABLET          Will file in chart as: SOLIFENACIN SUCCINATE 5 MG Oral Tab    Sig: TAKE 1 TABLET BY MOUTH EVERY DAY    Disp:

## 2021-02-01 RX ORDER — DILTIAZEM HYDROCHLORIDE 240 MG/1
CAPSULE, COATED, EXTENDED RELEASE ORAL
Qty: 90 CAPSULE | Refills: 0 | Status: SHIPPED | OUTPATIENT
Start: 2021-02-01 | End: 2021-04-26

## 2021-02-01 NOTE — TELEPHONE ENCOUNTER
Last refill on diclofenac #180 on 11/9/2020  Last refill on diltiazem #90 on 11/9/2020  Last office visit pertaining to refill on 10/29/20  No future appointments.   BP Readings from Last 3 Encounters:  10/29/20 : 128/84  08/31/20 : 134/80  07/18/20 : 114/8

## 2021-02-04 NOTE — TELEPHONE ENCOUNTER
LAST REFILL #180 ON 11/11/2020  LAST OFFICE VISIT PERTAINING TO REFILL ON 10/29/2020 - CPX  No future appointments. PATIENT IS DUE FOR A RECHECK ON A1C.  REMINDER LETTER SENT.

## 2021-03-18 DIAGNOSIS — Z23 NEED FOR VACCINATION: ICD-10-CM

## 2021-04-19 RX ORDER — LISINOPRIL 5 MG/1
5 TABLET ORAL DAILY
Qty: 30 TABLET | Refills: 0 | Status: SHIPPED | OUTPATIENT
Start: 2021-04-19 | End: 2021-04-20

## 2021-04-19 RX ORDER — ROSUVASTATIN CALCIUM 10 MG/1
TABLET, COATED ORAL
Qty: 30 TABLET | Refills: 0 | Status: SHIPPED | OUTPATIENT
Start: 2021-04-19 | End: 2021-04-20

## 2021-04-19 RX ORDER — SOLIFENACIN SUCCINATE 5 MG/1
TABLET, FILM COATED ORAL
Qty: 90 TABLET | Refills: 0 | Status: SHIPPED | OUTPATIENT
Start: 2021-04-19 | End: 2021-07-26

## 2021-04-19 NOTE — TELEPHONE ENCOUNTER
Last refill #90 on 1/22/2021  Last office visit pertaining to refill on 10/29/2020 - cpx  No future appointments.

## 2021-04-19 NOTE — TELEPHONE ENCOUNTER
Last OV: 10/29/20  Last labs: 10/29/20    No future appointments.     Due for OV & labs next month-- LMTCB

## 2021-04-20 RX ORDER — LISINOPRIL 5 MG/1
5 TABLET ORAL DAILY
Qty: 90 TABLET | Refills: 0 | Status: SHIPPED | OUTPATIENT
Start: 2021-04-20 | End: 2021-07-20

## 2021-04-20 RX ORDER — ROSUVASTATIN CALCIUM 10 MG/1
10 TABLET, COATED ORAL EVERY EVENING
Qty: 90 TABLET | Refills: 0 | Status: SHIPPED | OUTPATIENT
Start: 2021-04-20 | End: 2021-08-16

## 2021-04-22 RX ORDER — LISINOPRIL 5 MG/1
TABLET ORAL
Qty: 90 TABLET | Refills: 1 | OUTPATIENT
Start: 2021-04-22

## 2021-04-26 RX ORDER — DILTIAZEM HYDROCHLORIDE 240 MG/1
CAPSULE, COATED, EXTENDED RELEASE ORAL
Qty: 90 CAPSULE | Refills: 0 | Status: SHIPPED | OUTPATIENT
Start: 2021-04-26 | End: 2021-09-01

## 2021-04-26 NOTE — TELEPHONE ENCOUNTER
Last refill on diclofenac #180 on 2/1/2021  Last refill on diltiazem #90 on 2/1/2021  Last office visit pertaining to refill on 10/29/2020 - cpx  No future appointments.   BP Readings from Last 3 Encounters:  10/29/20 : 128/84  08/31/20 : 134/80  07/18/20 :

## 2021-05-01 ENCOUNTER — NURSE ONLY (OUTPATIENT)
Dept: FAMILY MEDICINE CLINIC | Facility: CLINIC | Age: 61
End: 2021-05-01
Payer: COMMERCIAL

## 2021-05-01 DIAGNOSIS — Z12.5 SCREENING FOR PROSTATE CANCER: ICD-10-CM

## 2021-05-01 DIAGNOSIS — E78.00 HYPERCHOLESTEREMIA: ICD-10-CM

## 2021-05-01 DIAGNOSIS — Z79.899 ENCOUNTER FOR LONG-TERM (CURRENT) DRUG USE: ICD-10-CM

## 2021-05-01 DIAGNOSIS — I10 ESSENTIAL HYPERTENSION: ICD-10-CM

## 2021-05-01 DIAGNOSIS — E11.9 TYPE 2 DIABETES MELLITUS WITHOUT COMPLICATION, WITHOUT LONG-TERM CURRENT USE OF INSULIN (HCC): ICD-10-CM

## 2021-05-01 PROCEDURE — 83036 HEMOGLOBIN GLYCOSYLATED A1C: CPT | Performed by: FAMILY MEDICINE

## 2021-05-01 PROCEDURE — 80053 COMPREHEN METABOLIC PANEL: CPT | Performed by: FAMILY MEDICINE

## 2021-05-01 PROCEDURE — 84153 ASSAY OF PSA TOTAL: CPT | Performed by: FAMILY MEDICINE

## 2021-05-01 PROCEDURE — 80061 LIPID PANEL: CPT | Performed by: FAMILY MEDICINE

## 2021-06-07 RX ORDER — LISINOPRIL 5 MG/1
TABLET ORAL
Qty: 90 TABLET | Refills: 0 | OUTPATIENT
Start: 2021-06-07

## 2021-06-07 RX ORDER — SOLIFENACIN SUCCINATE 5 MG/1
TABLET, FILM COATED ORAL
Qty: 90 TABLET | Refills: 0 | OUTPATIENT
Start: 2021-06-07

## 2021-07-20 RX ORDER — LISINOPRIL 5 MG/1
TABLET ORAL
Qty: 90 TABLET | Refills: 0 | Status: SHIPPED | OUTPATIENT
Start: 2021-07-20 | End: 2021-10-18

## 2021-07-20 NOTE — TELEPHONE ENCOUNTER
Last OV: 10/29/20  Last labs: 5/1/21    No future appointments. Due for OV-- [facility] sent.

## 2021-07-26 ENCOUNTER — TELEPHONE (OUTPATIENT)
Dept: FAMILY MEDICINE CLINIC | Facility: CLINIC | Age: 61
End: 2021-07-26

## 2021-07-26 RX ORDER — SOLIFENACIN SUCCINATE 5 MG/1
TABLET, FILM COATED ORAL
Qty: 30 TABLET | Refills: 0 | Status: SHIPPED | OUTPATIENT
Start: 2021-07-26 | End: 2021-07-26

## 2021-07-26 RX ORDER — SOLIFENACIN SUCCINATE 5 MG/1
5 TABLET, FILM COATED ORAL DAILY
Qty: 90 TABLET | Refills: 0 | Status: SHIPPED | OUTPATIENT
Start: 2021-07-26 | End: 2021-10-25

## 2021-07-26 NOTE — TELEPHONE ENCOUNTER
Left detail message on identified voicemail informing pt his due for office visit with  for medication follow up.      LOV: 10-    LAST LAB: 5-1-2021    LAST RX:     Medication Quantity Refills Start End   SOLIFENACIN SUCCINATE 5 MG Oral T

## 2021-07-26 NOTE — TELEPHONE ENCOUNTER
Lisinopril was refilled as #90 on 7/20/21    Solifenacin was refilled as #30 on 7/26, but we rec'd a fax from [facility] stating that pt's insurance requires a 90 day supply.     Resent as 90 days

## 2021-08-16 RX ORDER — ROSUVASTATIN CALCIUM 10 MG/1
TABLET, COATED ORAL
Qty: 90 TABLET | Refills: 0 | Status: SHIPPED | OUTPATIENT
Start: 2021-08-16 | End: 2021-11-10

## 2021-08-16 NOTE — TELEPHONE ENCOUNTER
Last refill #90 on 4/20/2021  Last office visit pertaining to refill on 10/29/2020  Future Appointments   Date Time Provider Michi Estrella   11/6/2021  8:00 AM Caroline Felix,  [facility] current  Refilled per protocol

## 2021-09-01 RX ORDER — DILTIAZEM HYDROCHLORIDE 240 MG/1
CAPSULE, COATED, EXTENDED RELEASE ORAL
Qty: 90 CAPSULE | Refills: 0 | Status: SHIPPED | OUTPATIENT
Start: 2021-09-01 | End: 2021-11-17

## 2021-09-01 NOTE — TELEPHONE ENCOUNTER
Last OV 10/29/20  Has a CPX scheduled on 11/13/21  Last labs on 5/1/21    Both were refilled [id number] on 4/26/21

## 2021-09-29 ENCOUNTER — PATIENT OUTREACH (OUTPATIENT)
Dept: FAMILY MEDICINE CLINIC | Facility: CLINIC | Age: 61
End: 2021-09-29

## 2021-10-18 RX ORDER — LISINOPRIL 5 MG/1
TABLET ORAL
Qty: 90 TABLET | Refills: 0 | Status: SHIPPED | OUTPATIENT
Start: 2021-10-18 | End: 2022-01-06

## 2021-10-18 NOTE — TELEPHONE ENCOUNTER
Requested Prescriptions     Pending Prescriptions Disp Refills   • LISINOPRIL 5 MG Oral Tab [Pharmacy Med Name: LISINOPRIL 5 MG TABLET] 90 tablet 0     Sig: TAKE 1 TABLET BY MOUTH EVERY DAY     Last refill #90 on 7/20/2021  Last office visit pertaining to

## 2021-10-25 RX ORDER — SOLIFENACIN SUCCINATE 5 MG/1
5 TABLET, FILM COATED ORAL DAILY
Qty: 90 TABLET | Refills: 3 | Status: SHIPPED | OUTPATIENT
Start: 2021-10-25 | End: 2021-12-21 | Stop reason: DRUGHIGH

## 2021-10-25 NOTE — TELEPHONE ENCOUNTER
Requested Prescriptions     Pending Prescriptions Disp Refills   • SOLIFENACIN SUCCINATE 5 MG Oral Tab [Pharmacy Med Name: SOLIFENACIN 5 MG TABLET] 90 tablet 0     Sig: TAKE 1 TABLET BY MOUTH EVERY DAY     Last refill #90 on 7/26/2021  Last office visit pe

## 2021-11-10 RX ORDER — ROSUVASTATIN CALCIUM 10 MG/1
TABLET, COATED ORAL
Qty: 90 TABLET | Refills: 0 | Status: SHIPPED | OUTPATIENT
Start: 2021-11-10 | End: 2022-02-04

## 2021-11-13 ENCOUNTER — OFFICE VISIT (OUTPATIENT)
Dept: FAMILY MEDICINE CLINIC | Facility: CLINIC | Age: 61
End: 2021-11-13
Payer: COMMERCIAL

## 2021-11-13 VITALS
HEART RATE: 96 BPM | SYSTOLIC BLOOD PRESSURE: 126 MMHG | DIASTOLIC BLOOD PRESSURE: 78 MMHG | WEIGHT: 242.13 LBS | HEIGHT: 69 IN | OXYGEN SATURATION: 98 % | BODY MASS INDEX: 35.86 KG/M2 | TEMPERATURE: 97 F

## 2021-11-13 DIAGNOSIS — Z79.899 ENCOUNTER FOR LONG-TERM (CURRENT) DRUG USE: ICD-10-CM

## 2021-11-13 DIAGNOSIS — J30.89 NON-SEASONAL ALLERGIC RHINITIS, UNSPECIFIED TRIGGER: ICD-10-CM

## 2021-11-13 DIAGNOSIS — E11.9 TYPE 2 DIABETES MELLITUS WITHOUT COMPLICATION, WITHOUT LONG-TERM CURRENT USE OF INSULIN (HCC): ICD-10-CM

## 2021-11-13 DIAGNOSIS — Z23 NEED FOR VACCINATION: ICD-10-CM

## 2021-11-13 DIAGNOSIS — R05.9 COUGH: ICD-10-CM

## 2021-11-13 DIAGNOSIS — F17.200 TOBACCO DEPENDENCE: ICD-10-CM

## 2021-11-13 DIAGNOSIS — M19.042 PRIMARY OSTEOARTHRITIS OF BOTH HANDS: ICD-10-CM

## 2021-11-13 DIAGNOSIS — E66.01 SEVERE OBESITY (BMI 35.0-39.9) WITH COMORBIDITY (HCC): ICD-10-CM

## 2021-11-13 DIAGNOSIS — E78.00 HYPERCHOLESTEREMIA: ICD-10-CM

## 2021-11-13 DIAGNOSIS — Z00.00 PREVENTATIVE HEALTH CARE: Primary | ICD-10-CM

## 2021-11-13 DIAGNOSIS — K21.9 GASTROESOPHAGEAL REFLUX DISEASE WITHOUT ESOPHAGITIS: ICD-10-CM

## 2021-11-13 DIAGNOSIS — G25.0 ESSENTIAL TREMOR: ICD-10-CM

## 2021-11-13 DIAGNOSIS — M19.041 PRIMARY OSTEOARTHRITIS OF BOTH HANDS: ICD-10-CM

## 2021-11-13 DIAGNOSIS — I10 ESSENTIAL HYPERTENSION: ICD-10-CM

## 2021-11-13 DIAGNOSIS — I48.91 ATRIAL FIBRILLATION AND FLUTTER (HCC): ICD-10-CM

## 2021-11-13 DIAGNOSIS — Z80.42 FAMILY HISTORY OF PROSTATE CANCER: ICD-10-CM

## 2021-11-13 DIAGNOSIS — M17.31 POST-TRAUMATIC OSTEOARTHRITIS OF RIGHT KNEE: ICD-10-CM

## 2021-11-13 DIAGNOSIS — I48.92 ATRIAL FIBRILLATION AND FLUTTER (HCC): ICD-10-CM

## 2021-11-13 DIAGNOSIS — I73.00 RAYNAUD'S PHENOMENON WITHOUT GANGRENE: ICD-10-CM

## 2021-11-13 PROCEDURE — 80061 LIPID PANEL: CPT | Performed by: FAMILY MEDICINE

## 2021-11-13 PROCEDURE — 90471 IMMUNIZATION ADMIN: CPT | Performed by: FAMILY MEDICINE

## 2021-11-13 PROCEDURE — 99396 PREV VISIT EST AGE 40-64: CPT | Performed by: FAMILY MEDICINE

## 2021-11-13 PROCEDURE — 80053 COMPREHEN METABOLIC PANEL: CPT | Performed by: FAMILY MEDICINE

## 2021-11-13 PROCEDURE — 3074F SYST BP LT 130 MM HG: CPT | Performed by: FAMILY MEDICINE

## 2021-11-13 PROCEDURE — 82570 ASSAY OF URINE CREATININE: CPT | Performed by: FAMILY MEDICINE

## 2021-11-13 PROCEDURE — 3008F BODY MASS INDEX DOCD: CPT | Performed by: FAMILY MEDICINE

## 2021-11-13 PROCEDURE — 82043 UR ALBUMIN QUANTITATIVE: CPT | Performed by: FAMILY MEDICINE

## 2021-11-13 PROCEDURE — 83036 HEMOGLOBIN GLYCOSYLATED A1C: CPT | Performed by: FAMILY MEDICINE

## 2021-11-13 PROCEDURE — 90715 TDAP VACCINE 7 YRS/> IM: CPT | Performed by: FAMILY MEDICINE

## 2021-11-13 PROCEDURE — 3078F DIAST BP <80 MM HG: CPT | Performed by: FAMILY MEDICINE

## 2021-11-13 RX ORDER — FLASH GLUCOSE SENSOR
KIT MISCELLANEOUS
Qty: 2 EACH | Refills: 5 | Status: SHIPPED | OUTPATIENT
Start: 2021-11-13

## 2021-11-13 RX ORDER — FLASH GLUCOSE SCANNING READER
1 EACH MISCELLANEOUS DAILY
Qty: 1 EACH | Refills: 0 | Status: SHIPPED | OUTPATIENT
Start: 2021-11-13

## 2021-11-13 RX ORDER — BUDESONIDE AND FORMOTEROL FUMARATE DIHYDRATE 160; 4.5 UG/1; UG/1
2 AEROSOL RESPIRATORY (INHALATION) 2 TIMES DAILY
Qty: 1 EACH | Refills: 0 | Status: SHIPPED | OUTPATIENT
Start: 2021-11-13 | End: 2021-12-06

## 2021-11-13 NOTE — H&P
Shaun Aguillon.  Tammy Almendarez. is a 62year old male who presents for a complete physical exam. Here wi/ wife  HPI:   Pt voices concerns of : tremors arms and legs, pt states when forgets his evening meds the tremor is better  Tremors are daily, worse by end of th 10/29/2020 24   05/04/2019 27   01/23/2016 28   12/16/2015 25   09/04/2015 27     ALT (U/L)   Date Value   05/01/2021 68 (H)   10/29/2020 42   05/04/2019 50   01/23/2016 37   12/16/2015 39   09/04/2015 28      Current Outpatient Medications   Medication serial CT scans since 2012   • Obesity    • Tobacco dependence    • Type 2 diabetes mellitus [facility])       Past Surgical History:   Procedure Laterality Date   • COLONOSCOPY  2/4/13   • HERNIA SURGERY Left     inguinal with mesh   • HERNIA SURGERY      umbil habits,no constipation, 2 soft stools per day   : denies dysuria, hesitancy, +nocturia x 3 ,denies decreased urine stream, incontinence, erectile dysfunction, decreased libido , +urinary freq,  PVR 10 cc 5/6/19  MUSCULOSKELETAL: denies back pain.  + Chron FOOT EXAM OF PLANTAR ASPECT: WITHOUT CALLUS, ULCERS OR GROSS DEFORMITIES, SENSATION INTACT TO MONOFILAMENT TESTING, 1+ DP / PT PULSES, NAILS W/ DYSTROPHIC CHANGES  NEURO: A &O X 3,cranial nerves are intact,motor and sensory are grossly intact, DTRs +2/4 UE Instructions   1. Preventative health care  I reviewed age-appropriate preventative health screening exams as well as advanced directives. I discussed age-appropriate immunizations.   Patient refuses pneumococcal vaccine, flu vaccine, shingles vaccine, Cov anticoagulation, monitor clinically    8. Tobacco dependence  I again discussed smoking cessation strategies including behavior modification, medications, triggers etc.    9. Raynaud's phenomenon without gangrene  Monitor clinically, quit smoking    10.  Pr

## 2021-11-13 NOTE — PATIENT INSTRUCTIONS
1. Preventative health care  I reviewed age-appropriate preventative health screening exams as well as advanced directives. I discussed age-appropriate immunizations. Patient refuses pneumococcal vaccine, flu vaccine, shingles vaccine, Covid vaccine.   He anticoagulation, monitor clinically    8. Tobacco dependence  I again discussed smoking cessation strategies including behavior modification, medications, triggers etc.    9. Raynaud's phenomenon without gangrene  Monitor clinically, quit smoking    10.  Pr

## 2021-11-15 DIAGNOSIS — E78.00 HYPERCHOLESTEREMIA: ICD-10-CM

## 2021-11-15 DIAGNOSIS — Z79.899 ENCOUNTER FOR LONG-TERM (CURRENT) DRUG USE: ICD-10-CM

## 2021-11-15 DIAGNOSIS — E11.9 TYPE 2 DIABETES MELLITUS WITHOUT COMPLICATION, WITHOUT LONG-TERM CURRENT USE OF INSULIN (HCC): Primary | ICD-10-CM

## 2021-11-15 DIAGNOSIS — I10 ESSENTIAL HYPERTENSION: ICD-10-CM

## 2021-11-15 RX ORDER — LANSOPRAZOLE 30 MG/1
30 CAPSULE, DELAYED RELEASE ORAL
Qty: 90 CAPSULE | Refills: 1 | Status: SHIPPED | OUTPATIENT
Start: 2021-11-15

## 2021-11-15 NOTE — TELEPHONE ENCOUNTER
Lansoprazole  Last refill: 10/29/20  Qty: 90  W/ 3 refills  Last ov: 11/13/21    Metformin  Last refill: 05/24/21  Qty: 180  W/ 1 refills  Last ov: 11/13/21  Requested Prescriptions     Pending Prescriptions Disp Refills   • LANSOPRAZOLE 30 MG Oral Capsule

## 2021-11-17 RX ORDER — DILTIAZEM HYDROCHLORIDE 240 MG/1
CAPSULE, COATED, EXTENDED RELEASE ORAL
Qty: 90 CAPSULE | Refills: 1 | Status: SHIPPED | OUTPATIENT
Start: 2021-11-17

## 2021-11-17 NOTE — TELEPHONE ENCOUNTER
Last OV: 11/13/21  Last labs: 11/13/21    Future Appointments   Date Time Provider Michi Estrella   12/21/2021  2:20 PM MD EDMAR Cisneros [facility]      Hypertension Medications Protocol Passed 11/17/2021 10:36 AM   Protocol Details  C

## 2021-11-23 ENCOUNTER — TELEPHONE (OUTPATIENT)
Dept: FAMILY MEDICINE CLINIC | Facility: CLINIC | Age: 61
End: 2021-11-23

## 2021-11-23 ENCOUNTER — MED REC SCAN ONLY (OUTPATIENT)
Dept: FAMILY MEDICINE CLINIC | Facility: CLINIC | Age: 61
End: 2021-11-23

## 2021-12-06 RX ORDER — BUDESONIDE AND FORMOTEROL FUMARATE DIHYDRATE 160; 4.5 UG/1; UG/1
2 AEROSOL RESPIRATORY (INHALATION) 2 TIMES DAILY
Qty: 10.2 EACH | Refills: 3 | Status: SHIPPED | OUTPATIENT
Start: 2021-12-06

## 2021-12-06 NOTE — TELEPHONE ENCOUNTER
Last OV: 11/13/21  Last labs: 11/13/21    Future Appointments   Date Time Provider Michi Estrella   12/21/2021  2:20 PM Magdi Graham MD [facility]

## 2021-12-16 RX ORDER — RIVAROXABAN 20 MG/1
TABLET, FILM COATED ORAL
Qty: 90 TABLET | Refills: 2 | Status: SHIPPED | OUTPATIENT
Start: 2021-12-16

## 2021-12-16 NOTE — TELEPHONE ENCOUNTER
MMB:21-    LAST LAB:11-13-21    LAST RX:    Medication Quantity Refills Start End   DICLOFENAC 50 MG Oral Tab  tablet 0 9/1/2021    Sig:   TAKE 1 TABLET BY MOUTH TWICE A DAY       Medication Quantity Refills Start End   Rivaroxaban (Katie Arnett) 2

## 2021-12-16 NOTE — TELEPHONE ENCOUNTER
Pt is going out of town tomorrow. She wanted to know if she can have these ready by 1pm at the pharmacy.   Perry Guerrero  - [phone]

## 2021-12-21 ENCOUNTER — OFFICE VISIT (OUTPATIENT)
Dept: NEUROLOGY | Facility: CLINIC | Age: 61
End: 2021-12-21
Payer: COMMERCIAL

## 2021-12-21 VITALS
WEIGHT: 242 LBS | RESPIRATION RATE: 16 BRPM | HEART RATE: 81 BPM | HEIGHT: 69 IN | SYSTOLIC BLOOD PRESSURE: 124 MMHG | DIASTOLIC BLOOD PRESSURE: 66 MMHG | BODY MASS INDEX: 35.84 KG/M2

## 2021-12-21 DIAGNOSIS — G25.0 ESSENTIAL TREMOR: Primary | ICD-10-CM

## 2021-12-21 PROCEDURE — 3078F DIAST BP <80 MM HG: CPT | Performed by: OTHER

## 2021-12-21 PROCEDURE — 3074F SYST BP LT 130 MM HG: CPT | Performed by: OTHER

## 2021-12-21 PROCEDURE — 3008F BODY MASS INDEX DOCD: CPT | Performed by: OTHER

## 2021-12-21 PROCEDURE — 99243 OFF/OP CNSLTJ NEW/EST LOW 30: CPT | Performed by: OTHER

## 2021-12-21 RX ORDER — TOPIRAMATE 25 MG/1
25 TABLET ORAL 2 TIMES DAILY
Qty: 60 TABLET | Refills: 5 | Status: SHIPPED | OUTPATIENT
Start: 2021-12-21

## 2021-12-21 RX ORDER — SOLIFENACIN SUCCINATE 5 MG/1
5 TABLET, FILM COATED ORAL DAILY
COMMUNITY

## 2021-12-21 NOTE — PROGRESS NOTES
[facility]   Neurology; INITIAL CLINIC VISIT  2021, 2:32 PM     Monica Welch. Hanny Kwon Patient Status:  No patient class for patient encounter    1960 MRN [id number]   Location [address] [place] [address] Attending No att. has been smoking. He has a 40.00 pack-year smoking history. He has never used smokeless tobacco. He reports that he does not drink alcohol and does not use drugs.     ALLERGIES:    Bee                         Comment:difficulty breathing , throat swells  Al daily, Disp: 100 strip, Rfl: 12  •  Blood Glucose Monitoring Suppl (ACCU-CHEK PRANEETH) Does not apply Device, As directed---to check blood glucose once daily, Disp: 1 Device, Rfl: 0  •  Accu-Chek Softclix Lancets Does not apply Misc, As directed-----to check thalamic nuclei instead of the deep brain stimulation might become a viable alternative.   Currently it is being done as a study protocol through [facility] 222 This Encounter      Solifenacin Succinate 5 MG Oral Tab

## 2022-01-06 RX ORDER — LISINOPRIL 5 MG/1
TABLET ORAL
Qty: 90 TABLET | Refills: 1 | Status: SHIPPED | OUTPATIENT
Start: 2022-01-06 | End: 2022-02-03

## 2022-01-06 NOTE — TELEPHONE ENCOUNTER
Requested Prescriptions     Pending Prescriptions Disp Refills   • LISINOPRIL 5 MG Oral Tab [Pharmacy Med Name: LISINOPRIL 5 MG TABLET] 90 tablet 0     Sig: TAKE 1 TABLET BY MOUTH EVERY DAY     Last refill #90 on 10/18/2021  Last office visit pertaining to

## 2022-02-01 ENCOUNTER — TELEPHONE (OUTPATIENT)
Dept: FAMILY MEDICINE CLINIC | Facility: CLINIC | Age: 62
End: 2022-02-01

## 2022-02-01 NOTE — TELEPHONE ENCOUNTER
Spoke with pt and wife. C/o continued chronic cough (>1 year), inhaler doesn't help. Now fever or new resp/ cold sx. Wife requests f/u appt to address this, dicuss other options. Needs a Sat appt. Okay to schedule in regular ofc on Sat 2/12? ALSO c/o lower abd pain where hernia was, x1 week. Advised IC for eval if abd pain worsens. Okay for the Sat appt?

## 2022-02-01 NOTE — TELEPHONE ENCOUNTER
Okay for Saturday appointment but we do not have x-ray on Saturday if that is needed. I would put him either at the beginning of the day or the end.   In the interim, have patient stop his lisinopril and monitor for resolution of cough, continue to monitor blood pressure

## 2022-02-02 NOTE — TELEPHONE ENCOUNTER
Pt's wife advised of below. Appt scheduled at 11:30. Pt aware that if he needs a CXR, he will need to RTC or another facility. Wife asks about his repeat CT chest that was to be done in November 2021. Advised it can be ordered at his upcoming OV. Wife asks about a pneumonia vaccine? Advised this can be discussed at his OV.     Wife will have him hold his Lisinopril for now and will monitor BP

## 2022-02-03 ENCOUNTER — HOSPITAL ENCOUNTER (OUTPATIENT)
Dept: GENERAL RADIOLOGY | Age: 62
Discharge: HOME | End: 2022-02-03
Attending: FAMILY MEDICINE
Payer: COMMERCIAL

## 2022-02-03 ENCOUNTER — OFFICE VISIT (OUTPATIENT)
Dept: FAMILY MEDICINE CLINIC | Facility: CLINIC | Age: 62
End: 2022-02-03
Payer: COMMERCIAL

## 2022-02-03 VITALS
SYSTOLIC BLOOD PRESSURE: 124 MMHG | HEART RATE: 88 BPM | WEIGHT: 244 LBS | TEMPERATURE: 97 F | DIASTOLIC BLOOD PRESSURE: 80 MMHG | RESPIRATION RATE: 12 BRPM | BODY MASS INDEX: 36 KG/M2

## 2022-02-03 DIAGNOSIS — F17.200 TOBACCO DEPENDENCE: ICD-10-CM

## 2022-02-03 DIAGNOSIS — R05.3 CHRONIC COUGH: ICD-10-CM

## 2022-02-03 DIAGNOSIS — I48.92 ATRIAL FIBRILLATION AND FLUTTER (HCC): ICD-10-CM

## 2022-02-03 DIAGNOSIS — G25.0 ESSENTIAL TREMOR: ICD-10-CM

## 2022-02-03 DIAGNOSIS — I48.91 ATRIAL FIBRILLATION AND FLUTTER (HCC): ICD-10-CM

## 2022-02-03 DIAGNOSIS — J01.40 ACUTE NON-RECURRENT PANSINUSITIS: ICD-10-CM

## 2022-02-03 DIAGNOSIS — I10 ESSENTIAL HYPERTENSION: ICD-10-CM

## 2022-02-03 DIAGNOSIS — S39.011A STRAIN OF ABDOMINAL MUSCLE, INITIAL ENCOUNTER: ICD-10-CM

## 2022-02-03 DIAGNOSIS — R05.3 CHRONIC COUGH: Primary | ICD-10-CM

## 2022-02-03 PROCEDURE — 3079F DIAST BP 80-89 MM HG: CPT | Performed by: FAMILY MEDICINE

## 2022-02-03 PROCEDURE — 3074F SYST BP LT 130 MM HG: CPT | Performed by: FAMILY MEDICINE

## 2022-02-03 PROCEDURE — 71046 X-RAY EXAM CHEST 2 VIEWS: CPT | Performed by: FAMILY MEDICINE

## 2022-02-03 PROCEDURE — 99214 OFFICE O/P EST MOD 30 MIN: CPT | Performed by: FAMILY MEDICINE

## 2022-02-03 RX ORDER — AMOXICILLIN AND CLAVULANATE POTASSIUM 875; 125 MG/1; MG/1
1 TABLET, FILM COATED ORAL 2 TIMES DAILY
Qty: 20 TABLET | Refills: 0 | Status: SHIPPED | OUTPATIENT
Start: 2022-02-03 | End: 2022-02-13

## 2022-02-03 NOTE — PATIENT INSTRUCTIONS
I discussed possible contributing factors for his chronic cough. I suspect that this may be related to the ACE inhibitor. I have asked patient to discontinue his lisinopril, monitor his blood pressure and monitor for resolution of the cough. In the interim I will treat him empirically for sinusitis with Augmentin 875 mg twice daily x10 days taken with food  Flonase 2 sprays per nostril daily, proper intranasal administration reviewed  I have asked patient to return this afternoon when radiology services are available for chest x-ray given his long smoking history  Patient was again counseled on smoking cessation strategies, support offered and declined  I reviewed age-appropriate immunizations. Would strongly encourage pneumococcal vaccination as well as influenza and COVID-19 however patient declines all vaccines.   I discussed the likely cause of the patient's left inguinal discomfort and suspect a abdominal strain  May use moist heat and acetaminophen as needed  If any palpable lump or swelling, would have patient see Dr. Kamilah Roberts for reevaluation of possible recurrent hernia

## 2022-02-04 RX ORDER — ROSUVASTATIN CALCIUM 10 MG/1
TABLET, COATED ORAL
Qty: 90 TABLET | Refills: 0 | Status: SHIPPED | OUTPATIENT
Start: 2022-02-04 | End: 2022-03-22

## 2022-02-09 NOTE — TELEPHONE ENCOUNTER
Patient cleared, EF on NM in September 2021 was 49%. Spoke with pt's wife. States pt saw ortho yesterday for knee injection.   States that when the ortho was reviewing pt's medlist, he was concerned about pt taking Xarelto and meloxicam. Asked pt how long he has been doing that, and suggested he speak to hi

## 2022-03-08 RX ORDER — BUDESONIDE AND FORMOTEROL FUMARATE DIHYDRATE 160; 4.5 UG/1; UG/1
AEROSOL RESPIRATORY (INHALATION)
Qty: 30.6 EACH | Refills: 1 | Status: SHIPPED | OUTPATIENT
Start: 2022-03-08 | End: 2022-03-23

## 2022-03-08 NOTE — TELEPHONE ENCOUNTER
Last OV: 2/3/22  Last labs: 11/13/21    Future Appointments   Date Time Provider Michi Scotti   3/23/2022  9:00 AM MD LULÚ Johnson

## 2022-03-22 RX ORDER — LANSOPRAZOLE 30 MG/1
CAPSULE, DELAYED RELEASE ORAL
Qty: 90 CAPSULE | Refills: 1 | Status: SHIPPED | OUTPATIENT
Start: 2022-03-22

## 2022-03-22 RX ORDER — ROSUVASTATIN CALCIUM 10 MG/1
TABLET, COATED ORAL
Qty: 90 TABLET | Refills: 0 | Status: SHIPPED | OUTPATIENT
Start: 2022-03-22

## 2022-03-22 RX ORDER — DILTIAZEM HYDROCHLORIDE 240 MG/1
CAPSULE, COATED, EXTENDED RELEASE ORAL
Qty: 90 CAPSULE | Refills: 1 | Status: SHIPPED | OUTPATIENT
Start: 2022-03-22

## 2022-03-22 NOTE — TELEPHONE ENCOUNTER
Last OV: 2/3/22  Last labs: 11/13/21  Labs due 5/15/22    Future Appointments   Date Time Provider Michi Estrella   3/23/2022  9:00 AM MD LULÚ Ferrell

## 2022-03-23 ENCOUNTER — OFFICE VISIT (OUTPATIENT)
Dept: NEUROLOGY | Facility: CLINIC | Age: 62
End: 2022-03-23
Payer: COMMERCIAL

## 2022-03-23 VITALS
BODY MASS INDEX: 36.14 KG/M2 | HEIGHT: 69 IN | SYSTOLIC BLOOD PRESSURE: 120 MMHG | WEIGHT: 244 LBS | DIASTOLIC BLOOD PRESSURE: 82 MMHG | RESPIRATION RATE: 16 BRPM | HEART RATE: 88 BPM

## 2022-03-23 DIAGNOSIS — G25.0 ESSENTIAL TREMOR: Primary | ICD-10-CM

## 2022-03-23 PROCEDURE — 3079F DIAST BP 80-89 MM HG: CPT | Performed by: OTHER

## 2022-03-23 PROCEDURE — 99214 OFFICE O/P EST MOD 30 MIN: CPT | Performed by: OTHER

## 2022-03-23 PROCEDURE — 3008F BODY MASS INDEX DOCD: CPT | Performed by: OTHER

## 2022-03-23 PROCEDURE — 3074F SYST BP LT 130 MM HG: CPT | Performed by: OTHER

## 2022-03-23 RX ORDER — BUDESONIDE AND FORMOTEROL FUMARATE DIHYDRATE 160; 4.5 UG/1; UG/1
2 AEROSOL RESPIRATORY (INHALATION) AS NEEDED
COMMUNITY

## 2022-03-23 RX ORDER — ASCORBIC ACID 500 MG
500 TABLET ORAL DAILY
COMMUNITY

## 2022-05-14 ENCOUNTER — NURSE ONLY (OUTPATIENT)
Dept: FAMILY MEDICINE CLINIC | Facility: CLINIC | Age: 62
End: 2022-05-14
Payer: COMMERCIAL

## 2022-05-14 DIAGNOSIS — Z12.5 SCREENING FOR PROSTATE CANCER: ICD-10-CM

## 2022-05-14 DIAGNOSIS — E78.00 HYPERCHOLESTEREMIA: ICD-10-CM

## 2022-05-14 DIAGNOSIS — E11.9 TYPE 2 DIABETES MELLITUS WITHOUT COMPLICATION, WITHOUT LONG-TERM CURRENT USE OF INSULIN (HCC): ICD-10-CM

## 2022-05-14 DIAGNOSIS — I10 ESSENTIAL HYPERTENSION: ICD-10-CM

## 2022-05-14 DIAGNOSIS — Z79.899 ENCOUNTER FOR LONG-TERM (CURRENT) DRUG USE: ICD-10-CM

## 2022-05-14 LAB
ALBUMIN SERPL-MCNC: 3.9 G/DL (ref 3.4–5)
ALBUMIN/GLOB SERPL: 1.6 {RATIO} (ref 1–2)
ALP LIVER SERPL-CCNC: 75 U/L
ALT SERPL-CCNC: 39 U/L
ANION GAP SERPL CALC-SCNC: 8 MMOL/L (ref 0–18)
AST SERPL-CCNC: 19 U/L (ref 15–37)
BILIRUB SERPL-MCNC: 0.6 MG/DL (ref 0.1–2)
BUN BLD-MCNC: 17 MG/DL (ref 7–18)
CALCIUM BLD-MCNC: 9 MG/DL (ref 8.5–10.1)
CHLORIDE SERPL-SCNC: 116 MMOL/L (ref 98–112)
CHOLEST SERPL-MCNC: 103 MG/DL (ref ?–200)
CO2 SERPL-SCNC: 21 MMOL/L (ref 21–32)
COMPLEXED PSA SERPL-MCNC: 0.99 NG/ML (ref ?–4)
CREAT BLD-MCNC: 0.88 MG/DL
CREAT UR-SCNC: 101 MG/DL
EST. AVERAGE GLUCOSE BLD GHB EST-MCNC: 140 MG/DL (ref 68–126)
FASTING PATIENT LIPID ANSWER: YES
FASTING STATUS PATIENT QL REPORTED: YES
GLOBULIN PLAS-MCNC: 2.5 G/DL (ref 2.8–4.4)
GLUCOSE BLD-MCNC: 125 MG/DL (ref 70–99)
HBA1C MFR BLD: 6.5 % (ref ?–5.7)
HDLC SERPL-MCNC: 25 MG/DL (ref 40–59)
LDLC SERPL CALC-MCNC: 58 MG/DL (ref ?–100)
MICROALBUMIN UR-MCNC: <0.5 MG/DL
NONHDLC SERPL-MCNC: 78 MG/DL (ref ?–130)
OSMOLALITY SERPL CALC.SUM OF ELEC: 303 MOSM/KG (ref 275–295)
POTASSIUM SERPL-SCNC: 4.6 MMOL/L (ref 3.5–5.1)
PROT SERPL-MCNC: 6.4 G/DL (ref 6.4–8.2)
SODIUM SERPL-SCNC: 145 MMOL/L (ref 136–145)
TRIGL SERPL-MCNC: 104 MG/DL (ref 30–149)
VLDLC SERPL CALC-MCNC: 15 MG/DL (ref 0–30)

## 2022-05-14 PROCEDURE — 84153 ASSAY OF PSA TOTAL: CPT | Performed by: FAMILY MEDICINE

## 2022-05-14 PROCEDURE — 82570 ASSAY OF URINE CREATININE: CPT | Performed by: FAMILY MEDICINE

## 2022-05-14 PROCEDURE — 80053 COMPREHEN METABOLIC PANEL: CPT | Performed by: FAMILY MEDICINE

## 2022-05-14 PROCEDURE — 82043 UR ALBUMIN QUANTITATIVE: CPT | Performed by: FAMILY MEDICINE

## 2022-05-14 PROCEDURE — 83036 HEMOGLOBIN GLYCOSYLATED A1C: CPT | Performed by: FAMILY MEDICINE

## 2022-05-14 PROCEDURE — 80061 LIPID PANEL: CPT | Performed by: FAMILY MEDICINE

## 2022-05-14 PROCEDURE — 3044F HG A1C LEVEL LT 7.0%: CPT | Performed by: FAMILY MEDICINE

## 2022-05-14 PROCEDURE — 3061F NEG MICROALBUMINURIA REV: CPT | Performed by: FAMILY MEDICINE

## 2022-05-16 RX ORDER — TOPIRAMATE 25 MG/1
50 TABLET ORAL 2 TIMES DAILY
Qty: 120 TABLET | Refills: 5 | Status: SHIPPED | OUTPATIENT
Start: 2022-05-16

## 2022-08-01 NOTE — TELEPHONE ENCOUNTER
Last OV 2/3/22    Metformin and lansoprazole were both refilled on 3/22/22 as 90 day with 1 refill. *Solifenacin is on medlist, but WE HAVEN'T REFILLED THIS?

## 2022-08-02 RX ORDER — LANSOPRAZOLE 30 MG/1
CAPSULE, DELAYED RELEASE ORAL
Qty: 90 CAPSULE | Refills: 1 | Status: SHIPPED | OUTPATIENT
Start: 2022-08-02

## 2022-08-02 RX ORDER — SOLIFENACIN SUCCINATE 5 MG/1
TABLET, FILM COATED ORAL
Qty: 90 TABLET | Refills: 3 | Status: SHIPPED | OUTPATIENT
Start: 2022-08-02

## 2022-08-18 RX ORDER — ROSUVASTATIN CALCIUM 10 MG/1
10 TABLET, COATED ORAL EVERY EVENING
Qty: 90 TABLET | Refills: 0 | OUTPATIENT
Start: 2022-08-18

## 2022-08-18 RX ORDER — ROSUVASTATIN CALCIUM 10 MG/1
TABLET, COATED ORAL
Qty: 90 TABLET | Refills: 0 | Status: SHIPPED | OUTPATIENT
Start: 2022-08-18

## 2022-08-18 NOTE — TELEPHONE ENCOUNTER
Cholesterol Medication Protocol Passed 08/18/2022 09:17 AM   Protocol Details  ALT < 80    ALT resulted within past year    Lipid panel within past 12 months    Appointment within past 12 or next 3 months        Last office visit:  02/03/22  Last refill:  03/22/22  #90, no refills  Last lipid:  05/14/22      Future Appointments   Date Time Provider Michi Estrella   10/7/2022  9:20 AM Yumiko Vang MD [facility]   11/19/2022  8:00 AM Ale Quickole., DO [facility]

## 2022-08-18 NOTE — TELEPHONE ENCOUNTER
Last office visit: 2/3/22  Last refill: 3/22/22  Labs Due: 11/16/22  Future Appointments   Date Time Provider Michi Scotti   10/7/2022  9:20 AM Cyrus Herman MD [facility]   11/19/2022  8:00 AM Adriano Hinson., DO [facility]

## 2022-09-10 ENCOUNTER — TELEPHONE (OUTPATIENT)
Dept: FAMILY MEDICINE CLINIC | Facility: CLINIC | Age: 62
End: 2022-09-10

## 2022-09-10 RX ORDER — DILTIAZEM HYDROCHLORIDE 240 MG/1
240 CAPSULE, COATED, EXTENDED RELEASE ORAL DAILY
Qty: 90 CAPSULE | Refills: 0 | Status: SHIPPED | OUTPATIENT
Start: 2022-09-10 | End: 2022-12-09

## 2022-09-10 NOTE — TELEPHONE ENCOUNTER
PT NEEDS REFILL ON-    DICLOFENAC 50 MG Oral Tab EC    WILL RUN LOW WHILE ON VACATION-    [id number] [employer] - [place], [facility] [address], [address]

## 2022-09-10 NOTE — TELEPHONE ENCOUNTER
LOV: 02/03/22    LAST LAB: 05/14/22    LAST RX:  Diltiazem 3/22/22, diltiazem 3/22/22, xarelto 12/6/21    Next OV:   Future Appointments   Date Time Provider Michi Delores   10/7/2022  9:20 AM Ashwini Pressley MD [facility]   11/19/2022  8:00 AM Manford Gosselin., DO [facility] [place]         PROTOCOL  Hypertension Medications Protocol Passed 09/10/2022 06:58 AM   Protocol Details  CMP or BMP in past 12 months    Last serum creatinine< 2.0    Appointment in past 6 or next 3 months

## 2022-11-14 RX ORDER — TOPIRAMATE 25 MG/1
TABLET ORAL
Qty: 360 TABLET | Refills: 1 | Status: SHIPPED | OUTPATIENT
Start: 2022-11-14

## 2022-11-14 RX ORDER — ROSUVASTATIN CALCIUM 10 MG/1
TABLET, COATED ORAL
Qty: 90 TABLET | Refills: 0 | Status: SHIPPED | OUTPATIENT
Start: 2022-11-14

## 2022-11-19 ENCOUNTER — OFFICE VISIT (OUTPATIENT)
Dept: FAMILY MEDICINE CLINIC | Facility: CLINIC | Age: 62
End: 2022-11-19
Payer: COMMERCIAL

## 2022-11-19 VITALS
RESPIRATION RATE: 12 BRPM | HEART RATE: 77 BPM | TEMPERATURE: 98 F | HEIGHT: 67.5 IN | WEIGHT: 217.5 LBS | SYSTOLIC BLOOD PRESSURE: 128 MMHG | OXYGEN SATURATION: 98 % | BODY MASS INDEX: 33.74 KG/M2 | DIASTOLIC BLOOD PRESSURE: 80 MMHG

## 2022-11-19 DIAGNOSIS — E66.9 OBESITY (BMI 30.0-34.9): ICD-10-CM

## 2022-11-19 DIAGNOSIS — I48.91 ATRIAL FIBRILLATION AND FLUTTER (HCC): ICD-10-CM

## 2022-11-19 DIAGNOSIS — I25.10 CORONARY ARTERY CALCIFICATION SEEN ON CAT SCAN: ICD-10-CM

## 2022-11-19 DIAGNOSIS — Z28.21 VACCINATION NOT CARRIED OUT BECAUSE OF PATIENT REFUSAL: ICD-10-CM

## 2022-11-19 DIAGNOSIS — Z79.01 CURRENT USE OF LONG TERM ANTICOAGULATION: ICD-10-CM

## 2022-11-19 DIAGNOSIS — E78.00 HYPERCHOLESTEREMIA: ICD-10-CM

## 2022-11-19 DIAGNOSIS — H93.13 TINNITUS OF BOTH EARS: ICD-10-CM

## 2022-11-19 DIAGNOSIS — G25.0 ESSENTIAL TREMOR: ICD-10-CM

## 2022-11-19 DIAGNOSIS — Z79.899 ENCOUNTER FOR LONG-TERM (CURRENT) DRUG USE: ICD-10-CM

## 2022-11-19 DIAGNOSIS — I10 ESSENTIAL HYPERTENSION: ICD-10-CM

## 2022-11-19 DIAGNOSIS — R35.0 URINARY FREQUENCY: ICD-10-CM

## 2022-11-19 DIAGNOSIS — K21.9 GASTROESOPHAGEAL REFLUX DISEASE WITHOUT ESOPHAGITIS: ICD-10-CM

## 2022-11-19 DIAGNOSIS — L82.1 SEBORRHEIC KERATOSIS OF SCALP: ICD-10-CM

## 2022-11-19 DIAGNOSIS — Z12.2 SCREENING FOR LUNG CANCER: ICD-10-CM

## 2022-11-19 DIAGNOSIS — E11.9 TYPE 2 DIABETES MELLITUS WITHOUT COMPLICATION, WITHOUT LONG-TERM CURRENT USE OF INSULIN (HCC): ICD-10-CM

## 2022-11-19 DIAGNOSIS — Z12.11 COLON CANCER SCREENING: ICD-10-CM

## 2022-11-19 DIAGNOSIS — M17.31 POST-TRAUMATIC OSTEOARTHRITIS OF RIGHT KNEE: ICD-10-CM

## 2022-11-19 DIAGNOSIS — Z80.42 FAMILY HISTORY OF PROSTATE CANCER: ICD-10-CM

## 2022-11-19 DIAGNOSIS — Z00.00 PREVENTATIVE HEALTH CARE: Primary | ICD-10-CM

## 2022-11-19 DIAGNOSIS — I48.92 ATRIAL FIBRILLATION AND FLUTTER (HCC): ICD-10-CM

## 2022-11-19 DIAGNOSIS — F17.200 TOBACCO DEPENDENCE: ICD-10-CM

## 2022-11-19 LAB
ALBUMIN SERPL-MCNC: 4.2 G/DL (ref 3.4–5)
ALBUMIN/GLOB SERPL: 1.6 {RATIO} (ref 1–2)
ALP LIVER SERPL-CCNC: 76 U/L
ALT SERPL-CCNC: 41 U/L
ANION GAP SERPL CALC-SCNC: 6 MMOL/L (ref 0–18)
AST SERPL-CCNC: 23 U/L (ref 15–37)
BILIRUB SERPL-MCNC: 0.5 MG/DL (ref 0.1–2)
BUN BLD-MCNC: 21 MG/DL (ref 7–18)
CALCIUM BLD-MCNC: 8.9 MG/DL (ref 8.5–10.1)
CHLORIDE SERPL-SCNC: 115 MMOL/L (ref 98–112)
CHOLEST SERPL-MCNC: 108 MG/DL (ref ?–200)
CO2 SERPL-SCNC: 23 MMOL/L (ref 21–32)
CREAT BLD-MCNC: 1.01 MG/DL
EST. AVERAGE GLUCOSE BLD GHB EST-MCNC: 131 MG/DL (ref 68–126)
FASTING PATIENT LIPID ANSWER: YES
FASTING STATUS PATIENT QL REPORTED: YES
GFR SERPLBLD BASED ON 1.73 SQ M-ARVRAT: 85 ML/MIN/1.73M2 (ref 60–?)
GLOBULIN PLAS-MCNC: 2.7 G/DL (ref 2.8–4.4)
GLUCOSE BLD-MCNC: 114 MG/DL (ref 70–99)
HBA1C MFR BLD: 6.2 % (ref ?–5.7)
HDLC SERPL-MCNC: 29 MG/DL (ref 40–59)
LDLC SERPL CALC-MCNC: 65 MG/DL (ref ?–100)
NONHDLC SERPL-MCNC: 79 MG/DL (ref ?–130)
OSMOLALITY SERPL CALC.SUM OF ELEC: 302 MOSM/KG (ref 275–295)
POTASSIUM SERPL-SCNC: 4.7 MMOL/L (ref 3.5–5.1)
PROT SERPL-MCNC: 6.9 G/DL (ref 6.4–8.2)
SODIUM SERPL-SCNC: 144 MMOL/L (ref 136–145)
TRIGL SERPL-MCNC: 63 MG/DL (ref 30–149)
VLDLC SERPL CALC-MCNC: 9 MG/DL (ref 0–30)

## 2022-11-19 PROCEDURE — 3008F BODY MASS INDEX DOCD: CPT | Performed by: FAMILY MEDICINE

## 2022-11-19 PROCEDURE — 3079F DIAST BP 80-89 MM HG: CPT | Performed by: FAMILY MEDICINE

## 2022-11-19 PROCEDURE — 80053 COMPREHEN METABOLIC PANEL: CPT | Performed by: FAMILY MEDICINE

## 2022-11-19 PROCEDURE — 83036 HEMOGLOBIN GLYCOSYLATED A1C: CPT | Performed by: FAMILY MEDICINE

## 2022-11-19 PROCEDURE — 3074F SYST BP LT 130 MM HG: CPT | Performed by: FAMILY MEDICINE

## 2022-11-19 PROCEDURE — 99396 PREV VISIT EST AGE 40-64: CPT | Performed by: FAMILY MEDICINE

## 2022-11-19 PROCEDURE — 80061 LIPID PANEL: CPT | Performed by: FAMILY MEDICINE

## 2022-11-19 NOTE — PATIENT INSTRUCTIONS
1. Preventative health care  I reviewed age-appropriate preventive health screening exams as well as advanced directives and immunizations. Patient declines all vaccines today    2. Type 2 diabetes mellitus without complication, without long-term current use of insulin ([facility] [address]  I reviewed goals for fasting and postmeal blood sugars as well as A1c. I would anticipate a lower A1c with his significant weight loss. Follow-up for dilated eye exam today as scheduled. Discussed importance of routine foot exams and good fitting footwear  Will defer adding ARB pending A1c  - HEMOGLOBIN C4G  - COMP METABOLIC PANEL (14)    3. Hypercholesteremia  Reviewed goals for lipids. Continue statin therapy  - LIPID PANEL  - COMP METABOLIC PANEL (14)    4. Essential hypertension  Patient strongly encouraged to monitor his blood pressure at home taking after 3 consecutive readings at various times of the day  I reviewed goals for blood pressure as well as conservative management of hypertension including sodium restriction, daily aerobic activity, alcohol moderation, smoking cessation, and maintaining ideal body weight.    - COMP METABOLIC PANEL (14)    5. Encounter for long-term (current) drug use  Check labs  - COMP METABOLIC PANEL (14)    6. Tobacco dependence  Patient congratulated on cutting down and encouraged to continue smoking cessation efforts    7. Atrial fibrillation and flutter (HCC)  Continue rate control and anticoagulation    8. Current use of long term anticoagulation  Continue anticoagulation    9. Gastroesophageal reflux disease without esophagitis  Antireflux measures reviewed. Continue acid suppression while on NSAIDs    10. Family history of prostate cancer  Continue annual surveillance    11. Post-traumatic osteoarthritis of right knee  Activity as tolerated, follow-up with orthopedics as needed    12. Essential tremor  Continue topiramate, follow-up with neurology as scheduled, monitor weight loss    13. Coronary artery calcification seen on CAT scan  Continue current meds and risk factor reduction efforts    14. Vaccination not carried out because of patient refusal  Discussed age-appropriate mutations including influenza, shingles and pneumococcal vaccine    15. Colon cancer screening  Cologuard ordered    16. Screening for lung cancer  Lung cancer screening ordered  - CT LUNG LD SCREENING(CPT=71271); Future    17. Urinary frequency  Continue antispasmodics    18. Obesity (BMI 30.0-34. 9)  Continue weight loss efforts    19. Seborrheic keratosis of scalp  Discussed benign skin lesion. Discussed treatment option. Patient declines any treatment    20. Tinnitus-bilateral  I discussed high-frequency nerve damage causing tinnitus. Would recommend formal audiology evaluation.   Patient's wife states she will check with her insurance for participating audiologist.

## 2022-11-21 DIAGNOSIS — E11.9 TYPE 2 DIABETES MELLITUS WITHOUT COMPLICATION, WITHOUT LONG-TERM CURRENT USE OF INSULIN (HCC): Primary | ICD-10-CM

## 2022-12-03 ENCOUNTER — HOSPITAL ENCOUNTER (OUTPATIENT)
Dept: CT IMAGING | Age: 62
Discharge: HOME | End: 2022-12-03
Attending: FAMILY MEDICINE
Payer: COMMERCIAL

## 2022-12-03 DIAGNOSIS — Z12.2 SCREENING FOR LUNG CANCER: ICD-10-CM

## 2022-12-03 PROCEDURE — 71271 CT THORAX LUNG CANCER SCR C-: CPT | Performed by: FAMILY MEDICINE

## 2023-01-12 ENCOUNTER — TELEPHONE (OUTPATIENT)
Dept: FAMILY MEDICINE CLINIC | Facility: CLINIC | Age: 63
End: 2023-01-12

## 2023-01-12 NOTE — TELEPHONE ENCOUNTER
See below. Was seen on 11/19/22 for a cpx. Did you just want him to f/u 6 months later?   In May?    (I don't see anything different noted in charting)

## 2023-01-12 NOTE — TELEPHONE ENCOUNTER
Spoke wife and reschedule patient for 5/13/23 to see Dr. Telma Rodrigues and have lab work done.    Future Appointments   Date Time Provider Michi Estrella   5/13/2023  8:00 AM Jonathon Vivar., DO [facility] [place]

## 2023-01-30 RX ORDER — LANSOPRAZOLE 30 MG/1
30 CAPSULE, DELAYED RELEASE ORAL
Qty: 90 CAPSULE | Refills: 3 | Status: SHIPPED | OUTPATIENT
Start: 2023-01-30

## 2023-02-11 RX ORDER — ROSUVASTATIN CALCIUM 10 MG/1
TABLET, COATED ORAL
Qty: 90 TABLET | Refills: 0 | Status: SHIPPED | OUTPATIENT
Start: 2023-02-11

## 2023-02-11 NOTE — TELEPHONE ENCOUNTER
LOV  11-19-22    LAST LAB 11-19-22 Chem Profile ALT 41                   11-19-22 Lipids     LAST RX  11-14-22 #90    Next OV   Future Appointments   Date Time Provider Michi Estrella   5/13/2023  8:00 AM Marcellus Lewis DO [facility] [place]         PROTOCOL  Cholesterol Medication Protocol Passed 02/11/2023 08:26 AM   Protocol Details  ALT < 80    ALT resulted within past year    Lipid panel within past 12 months    Appointment within past 12 or next 3 months

## 2023-02-27 RX ORDER — DILTIAZEM HYDROCHLORIDE 240 MG/1
CAPSULE, COATED, EXTENDED RELEASE ORAL
Qty: 90 CAPSULE | Refills: 0 | Status: SHIPPED | OUTPATIENT
Start: 2023-02-27

## 2023-02-27 NOTE — TELEPHONE ENCOUNTER
LOV  11-19-22    LAST LAB 11-19-22 Chem Profile                      Creatinine 1.01    LAST RX 9-10-22 #90    Next OV   Future Appointments   Date Time Provider Michi Estrella   5/13/2023  8:00 AM Linh Bejarano., DO [facility] [place]         PROTOCOL  Hypertension Medications Protocol Passed 02/27/2023 12:41 AM   Protocol Details  CMP or BMP in past 12 months    Last serum creatinine< 2.0    Appointment in past 6 or next 3 months

## 2023-03-20 NOTE — TELEPHONE ENCOUNTER
DICLOFENAC SOD EC 50 MG TAB    Last office visit: 11/19/22  Future Appointments   Date Time Provider Michi Estrella   5/13/2023  8:00 AM Shakir Vallejo., DO [facility] [place]     Last filled: 11/15/22  #180 with 0 refills  Last labs: 11/19/22

## 2023-05-12 RX ORDER — ROSUVASTATIN CALCIUM 10 MG/1
TABLET, COATED ORAL
Qty: 90 TABLET | Refills: 1 | Status: SHIPPED | OUTPATIENT
Start: 2023-05-12

## 2023-05-12 NOTE — TELEPHONE ENCOUNTER
Cholesterol Medication Protocol Passed 05/12/2023 12:55 AM   Protocol Details  ALT < 80    ALT resulted within past year    Lipid panel within past 12 months    Appointment within past 12 or next 3 months     Last visit 11/19/2022  Lipids 11/19/2022  ALT 11/19/2022   41  Refilled per protocol

## 2023-05-13 ENCOUNTER — OFFICE VISIT (OUTPATIENT)
Dept: FAMILY MEDICINE CLINIC | Facility: CLINIC | Age: 63
End: 2023-05-13
Payer: COMMERCIAL

## 2023-05-13 VITALS
OXYGEN SATURATION: 96 % | RESPIRATION RATE: 19 BRPM | HEART RATE: 81 BPM | SYSTOLIC BLOOD PRESSURE: 115 MMHG | BODY MASS INDEX: 32 KG/M2 | DIASTOLIC BLOOD PRESSURE: 80 MMHG | WEIGHT: 208.38 LBS | TEMPERATURE: 97 F

## 2023-05-13 DIAGNOSIS — Z79.1 ENCOUNTER FOR LONG-TERM (CURRENT) USE OF NSAIDS: ICD-10-CM

## 2023-05-13 DIAGNOSIS — E66.9 OBESITY (BMI 30.0-34.9): ICD-10-CM

## 2023-05-13 DIAGNOSIS — I48.92 ATRIAL FIBRILLATION AND FLUTTER (HCC): ICD-10-CM

## 2023-05-13 DIAGNOSIS — Z80.42 FAMILY HISTORY OF PROSTATE CANCER: ICD-10-CM

## 2023-05-13 DIAGNOSIS — I10 ESSENTIAL HYPERTENSION: ICD-10-CM

## 2023-05-13 DIAGNOSIS — K21.9 GASTROESOPHAGEAL REFLUX DISEASE WITHOUT ESOPHAGITIS: ICD-10-CM

## 2023-05-13 DIAGNOSIS — F17.200 TOBACCO DEPENDENCE: ICD-10-CM

## 2023-05-13 DIAGNOSIS — E11.9 TYPE 2 DIABETES MELLITUS WITHOUT COMPLICATION, WITHOUT LONG-TERM CURRENT USE OF INSULIN (HCC): Primary | ICD-10-CM

## 2023-05-13 DIAGNOSIS — G25.0 ESSENTIAL TREMOR: ICD-10-CM

## 2023-05-13 DIAGNOSIS — M54.12 RIGHT CERVICAL RADICULOPATHY: ICD-10-CM

## 2023-05-13 DIAGNOSIS — E78.00 HYPERCHOLESTEREMIA: ICD-10-CM

## 2023-05-13 DIAGNOSIS — Z79.01 CURRENT USE OF LONG TERM ANTICOAGULATION: ICD-10-CM

## 2023-05-13 DIAGNOSIS — M17.31 POST-TRAUMATIC OSTEOARTHRITIS OF RIGHT KNEE: ICD-10-CM

## 2023-05-13 DIAGNOSIS — Z12.11 COLON CANCER SCREENING: ICD-10-CM

## 2023-05-13 DIAGNOSIS — I48.91 ATRIAL FIBRILLATION AND FLUTTER (HCC): ICD-10-CM

## 2023-05-13 DIAGNOSIS — Z12.5 SCREENING FOR PROSTATE CANCER: ICD-10-CM

## 2023-05-13 LAB
ANION GAP SERPL CALC-SCNC: 2 MMOL/L (ref 0–18)
BUN BLD-MCNC: 26 MG/DL (ref 7–18)
CALCIUM BLD-MCNC: 9.2 MG/DL (ref 8.5–10.1)
CHLORIDE SERPL-SCNC: 117 MMOL/L (ref 98–112)
CO2 SERPL-SCNC: 20 MMOL/L (ref 21–32)
COMPLEXED PSA SERPL-MCNC: 1 NG/ML (ref ?–4)
CREAT BLD-MCNC: 0.92 MG/DL
CREAT UR-SCNC: 136 MG/DL
EST. AVERAGE GLUCOSE BLD GHB EST-MCNC: 126 MG/DL (ref 68–126)
FASTING STATUS PATIENT QL REPORTED: NO
GFR SERPLBLD BASED ON 1.73 SQ M-ARVRAT: 94 ML/MIN/1.73M2 (ref 60–?)
GLUCOSE BLD-MCNC: 106 MG/DL (ref 70–99)
HBA1C MFR BLD: 6 % (ref ?–5.7)
MICROALBUMIN UR-MCNC: 0.68 MG/DL
MICROALBUMIN/CREAT 24H UR-RTO: 5 UG/MG (ref ?–30)
OSMOLALITY SERPL CALC.SUM OF ELEC: 293 MOSM/KG (ref 275–295)
POTASSIUM SERPL-SCNC: 4.4 MMOL/L (ref 3.5–5.1)
SODIUM SERPL-SCNC: 139 MMOL/L (ref 136–145)

## 2023-05-13 PROCEDURE — 83036 HEMOGLOBIN GLYCOSYLATED A1C: CPT | Performed by: FAMILY MEDICINE

## 2023-05-13 PROCEDURE — 82043 UR ALBUMIN QUANTITATIVE: CPT | Performed by: FAMILY MEDICINE

## 2023-05-13 PROCEDURE — 3074F SYST BP LT 130 MM HG: CPT | Performed by: FAMILY MEDICINE

## 2023-05-13 PROCEDURE — 3079F DIAST BP 80-89 MM HG: CPT | Performed by: FAMILY MEDICINE

## 2023-05-13 PROCEDURE — 82570 ASSAY OF URINE CREATININE: CPT | Performed by: FAMILY MEDICINE

## 2023-05-13 PROCEDURE — 3061F NEG MICROALBUMINURIA REV: CPT | Performed by: NURSE PRACTITIONER

## 2023-05-13 PROCEDURE — 3044F HG A1C LEVEL LT 7.0%: CPT | Performed by: NURSE PRACTITIONER

## 2023-05-13 PROCEDURE — 99215 OFFICE O/P EST HI 40 MIN: CPT | Performed by: FAMILY MEDICINE

## 2023-05-13 PROCEDURE — 80048 BASIC METABOLIC PNL TOTAL CA: CPT | Performed by: FAMILY MEDICINE

## 2023-05-13 NOTE — PATIENT INSTRUCTIONS
1. Type 2 diabetes mellitus without complication, without long-term current use of insulin ([facility] [address]  I reviewed goals for fasting and postmeal blood sugars as well as hemoglobin A1c. Patient strongly encouraged to monitor his blood sugar at least after meals occasionally to give him feedback as to healthy food choices. Patient was congratulated on his weight loss efforts and would anticipate decreasing meds if A1c is lower. Discussed the importance of annual dilated retinal exams and glaucoma screening as well as good fitting footwear and routine foot exams  Patient states eye exam is scheduled for sometime in the next 1 to 2 months  - HEMOGLOBIN A1C  - MICROALB/CREAT RATIO, RANDOM URINE    2. Essential hypertension  I reviewed goals for blood pressure as well as conservative management of hypertension including sodium restriction, daily aerobic activity, alcohol moderation, smoking cessation, and maintaining ideal body weight. Patient encouraged to monitor his blood pressure at home  - BASIC METABOLIC PANEL (8); Future  - BASIC METABOLIC PANEL (8)    3. Hypercholesteremia  Reviewed goals for lipids. Continue statin therapy, recheck labs annually    4. Tobacco dependence  Patient again counseled on smoking cessation. Continue weaning    5. Gastroesophageal reflux disease without esophagitis  Antireflux measures reviewed. Continue PPI while on NSAIDs    6. Atrial fibrillation and flutter (HCC)  Continue rate control and anticoagulation    7. Current use of long term anticoagulation  Continue anticoagulation    8. Family history of prostate cancer  Continue annual surveillance    9. Screening for prostate cancer  Continue annual surveillance  - PSA SCREEN    10. Colon cancer screening  Patient strongly encouraged to complete Cologuard as previously ordered    11. Post-traumatic osteoarthritis of right knee  Activity as tolerated, monitor clinically, follow-up with orthopedics as needed    12.  Encounter for long-term (current) use of NSAIDs  Continue to monitor kidney function  - BASIC METABOLIC PANEL (8); Future  - BASIC METABOLIC PANEL (8)    13. Obesity (BMI 30.0-34. 9)  Patient congratulated on continued weight loss efforts on healthy food choices    14. Essential tremor  Continue topiramate, monitor clinically    15. Right cervical radiculopathy  Follow-up for MRI as scheduled.

## 2023-05-15 DIAGNOSIS — Z12.5 SCREENING FOR PROSTATE CANCER: ICD-10-CM

## 2023-05-15 DIAGNOSIS — I10 ESSENTIAL HYPERTENSION: ICD-10-CM

## 2023-05-15 DIAGNOSIS — Z79.899 ENCOUNTER FOR LONG-TERM (CURRENT) DRUG USE: ICD-10-CM

## 2023-05-15 DIAGNOSIS — E78.00 HYPERCHOLESTEREMIA: ICD-10-CM

## 2023-05-15 DIAGNOSIS — E11.9 TYPE 2 DIABETES MELLITUS WITHOUT COMPLICATION, WITHOUT LONG-TERM CURRENT USE OF INSULIN (HCC): Primary | ICD-10-CM

## 2023-05-19 ENCOUNTER — PATIENT MESSAGE (OUTPATIENT)
Dept: FAMILY MEDICINE CLINIC | Facility: CLINIC | Age: 63
End: 2023-05-19

## 2023-05-19 RX ORDER — TOPIRAMATE 25 MG/1
50 TABLET ORAL 2 TIMES DAILY
Qty: 360 TABLET | Refills: 1 | Status: CANCELLED | OUTPATIENT
Start: 2023-05-19

## 2023-05-20 RX ORDER — TOPIRAMATE 25 MG/1
50 TABLET ORAL 2 TIMES DAILY
Qty: 360 TABLET | Refills: 1 | Status: SHIPPED | OUTPATIENT
Start: 2023-05-20

## 2023-05-20 RX ORDER — DILTIAZEM HYDROCHLORIDE 240 MG/1
CAPSULE, COATED, EXTENDED RELEASE ORAL
Qty: 90 CAPSULE | Refills: 1 | Status: SHIPPED | OUTPATIENT
Start: 2023-05-20

## 2023-05-20 RX ORDER — DILTIAZEM HYDROCHLORIDE 240 MG/1
240 CAPSULE, COATED, EXTENDED RELEASE ORAL DAILY
Qty: 90 CAPSULE | Refills: 0 | OUTPATIENT
Start: 2023-05-20

## 2023-05-20 NOTE — TELEPHONE ENCOUNTER
METFORMIN 500 MG Oral Tab      LOV  5-13-23    LAST LAB  5-13-23 Hgba1c 6.0                   5-13-23 Microalbumin    LAST RX  3-17-23  #180    Next OV  No future appointments. Verified with patient he is now taking once a day as directed by Dr Germania Abad. PROTOCOL  Diabetic Medication Protocol Passed 05/19/2023 04:58 PM   Protocol Details  HgBA1C procedure resulted in past 6 months    Last HgBA1C < 7.5    Microalbumin procedure in past 12 months or taking ACE/ARB    Appointment in past 6 or next 3 months         DILTIAZEM  MG Oral Capsule SR 24 Hr    LOV  5-13-23    LAST LAB  5-13-23  BMP  Creatinine 0.92    LAST RX  2-27-23 #90    Next OV  No future appointments.       PROTOCOL  Hypertension Medications Protocol Passed 05/19/2023 04:58 PM   Protocol Details  CMP or BMP in past 12 months    Last serum creatinine< 2.0    Appointment in past 6 or next 3 months

## 2023-05-20 NOTE — TELEPHONE ENCOUNTER
diclofenac 50 MG Oral Tab EC    LOV 5-13-23    LAST RX  3-20-23 #180    Next OV  No future appointments.         DILTIAZEM  MG Oral Capsule SR 24 already sent to pharmacy see other encounter

## 2023-05-20 NOTE — TELEPHONE ENCOUNTER
From: Lulu Garcia. Hanny Kwon To: Maria Alejandra Castillo. Shay Mancuso DO  Sent: 5/19/2023 5:11 PM CDT  Subject: Topiramate 25mg tablets for essential tremors      I need that med filled. My last pills are this Sunday. Sorry late with request. Can you please call it in to the pharmacy Saturday morning? I do appreciate it.   Neel Staff

## 2023-05-22 RX ORDER — TOPIRAMATE 25 MG/1
TABLET ORAL
Qty: 360 TABLET | Refills: 0 | Status: SHIPPED | OUTPATIENT
Start: 2023-05-22

## 2023-05-24 ENCOUNTER — OFFICE VISIT (OUTPATIENT)
Dept: FAMILY MEDICINE CLINIC | Facility: CLINIC | Age: 63
End: 2023-05-24
Payer: COMMERCIAL

## 2023-05-24 VITALS
BODY MASS INDEX: 32 KG/M2 | TEMPERATURE: 98 F | OXYGEN SATURATION: 99 % | WEIGHT: 208 LBS | SYSTOLIC BLOOD PRESSURE: 118 MMHG | RESPIRATION RATE: 18 BRPM | HEART RATE: 60 BPM | DIASTOLIC BLOOD PRESSURE: 76 MMHG

## 2023-05-24 DIAGNOSIS — J01.00 ACUTE NON-RECURRENT MAXILLARY SINUSITIS: Primary | ICD-10-CM

## 2023-05-24 DIAGNOSIS — J02.9 SORE THROAT: ICD-10-CM

## 2023-05-24 LAB
CONTROL LINE PRESENT WITH A CLEAR BACKGROUND (YES/NO): YES YES/NO
KIT LOT #: NORMAL NUMERIC
OPERATOR ID: NORMAL
RAPID SARS-COV-2 BY PCR: NOT DETECTED
STREP GRP A CUL-SCR: NEGATIVE

## 2023-05-24 PROCEDURE — 99213 OFFICE O/P EST LOW 20 MIN: CPT | Performed by: NURSE PRACTITIONER

## 2023-05-24 PROCEDURE — 3074F SYST BP LT 130 MM HG: CPT | Performed by: NURSE PRACTITIONER

## 2023-05-24 PROCEDURE — U0002 COVID-19 LAB TEST NON-CDC: HCPCS | Performed by: NURSE PRACTITIONER

## 2023-05-24 PROCEDURE — 87880 STREP A ASSAY W/OPTIC: CPT | Performed by: NURSE PRACTITIONER

## 2023-05-24 PROCEDURE — 3078F DIAST BP <80 MM HG: CPT | Performed by: NURSE PRACTITIONER

## 2023-05-24 RX ORDER — AMOXICILLIN AND CLAVULANATE POTASSIUM 875; 125 MG/1; MG/1
1 TABLET, FILM COATED ORAL 2 TIMES DAILY
Qty: 20 TABLET | Refills: 0 | Status: SHIPPED | OUTPATIENT
Start: 2023-05-24 | End: 2023-06-03

## 2023-05-24 NOTE — PATIENT INSTRUCTIONS
1. Rest. Drink plenty of fluids. 2. Supportive care as discussed. Augmentin as prescribed. 3. Salt water gargles three times daily  4. Use humidifier at home when possible. 5. The rapid strep test was negative today. 6. Covid-19 test is negative. 7. Follow up with PMD in 4-5 days for re-eval. Go to the emergency department immediately if symptoms worsen, change, you develop chest discomfort, wheezing, shortness of breath, or if you have any concerns.

## 2023-08-11 ENCOUNTER — OFFICE VISIT (OUTPATIENT)
Dept: PAIN CLINIC | Facility: CLINIC | Age: 63
End: 2023-08-11
Payer: COMMERCIAL

## 2023-08-11 ENCOUNTER — MED REC SCAN ONLY (OUTPATIENT)
Dept: PAIN CLINIC | Facility: CLINIC | Age: 63
End: 2023-08-11

## 2023-08-11 ENCOUNTER — TELEPHONE (OUTPATIENT)
Dept: FAMILY MEDICINE CLINIC | Facility: CLINIC | Age: 63
End: 2023-08-11

## 2023-08-11 VITALS
DIASTOLIC BLOOD PRESSURE: 70 MMHG | WEIGHT: 212 LBS | OXYGEN SATURATION: 97 % | HEART RATE: 94 BPM | BODY MASS INDEX: 33 KG/M2 | SYSTOLIC BLOOD PRESSURE: 132 MMHG

## 2023-08-11 DIAGNOSIS — M79.18 MYOFASCIAL PAIN: ICD-10-CM

## 2023-08-11 DIAGNOSIS — M48.04 THORACIC STENOSIS: Primary | ICD-10-CM

## 2023-08-11 PROCEDURE — 99205 OFFICE O/P NEW HI 60 MIN: CPT | Performed by: PHYSICIAN ASSISTANT

## 2023-08-11 PROCEDURE — 3075F SYST BP GE 130 - 139MM HG: CPT | Performed by: PHYSICIAN ASSISTANT

## 2023-08-11 PROCEDURE — 3078F DIAST BP <80 MM HG: CPT | Performed by: PHYSICIAN ASSISTANT

## 2023-08-11 NOTE — TELEPHONE ENCOUNTER
We did received the fax from them. It is on Dr. Reginald Santoyo desk for review and completion. Message left with them that we did receive it and will fax back on Monday.

## 2023-08-11 NOTE — PROGRESS NOTES
Patient presents in office today with reported pain in mid back area, lump like.      Current pain level reported = 7/10    Last reported dose of NA this morning      Narcotic Contract renewal NA  Urine Drug screen NA

## 2023-08-11 NOTE — PROGRESS NOTES
Location of Pain: mid upper back    Date Pain Began: 5/11/23 (3 months ago)          Work Related:   No        Receiving Work Comp/Disability:   No    Numeric Rating Scale:  Pain at Present:  7                                                                                                            (No Pain) 0  to  10 (Worst Pain)                 Minimum Pain:   3  Maximum Pain  8    Distribution of Pain:    right    Quality of Pain:   sharp/stabbing, lump    Origin of Pain:    Lifting    Aggravating Factors:    Sitting and Standing    Past Treatments for Current Pain Condition:   Other PT, Ice, Tylenol    Prior diagnostic testing for your pain:  MRI, Xray

## 2023-08-11 NOTE — TELEPHONE ENCOUNTER
Felicita from physician immediate care called to receive confirmation that we received a fax for a DOT physical clearance form that they need completed in order to approve his physical.

## 2023-08-14 ENCOUNTER — TELEPHONE (OUTPATIENT)
Dept: PAIN CLINIC | Facility: CLINIC | Age: 63
End: 2023-08-14

## 2023-08-14 NOTE — TELEPHONE ENCOUNTER
Last OV: 5/13/23  Last labs: 5/13/23    Future Appointments   Date Time Provider Michi Scotti   12/20/2023  9:00 AM Eldon Rides, MD [place] [facility] [place]     Metformin:  Diabetic Medication Protocol [id number]/14/2023 02:24 PM   Protocol Details HgBA1C procedure resulted in past 6 months    Last HgBA1C < 7.5    Microalbumin procedure in past 12 months or taking ACE/ARB    Appointment in past 6 or next 3 months

## 2023-08-15 RX ORDER — SOLIFENACIN SUCCINATE 5 MG/1
5 TABLET, FILM COATED ORAL DAILY
Qty: 90 TABLET | Refills: 3 | Status: SHIPPED | OUTPATIENT
Start: 2023-08-15

## 2023-08-20 ENCOUNTER — PATIENT MESSAGE (OUTPATIENT)
Dept: FAMILY MEDICINE CLINIC | Facility: CLINIC | Age: 63
End: 2023-08-20

## 2023-08-21 RX ORDER — TRAMADOL HYDROCHLORIDE 50 MG/1
50 TABLET ORAL NIGHTLY PRN
Qty: 15 TABLET | Refills: 0 | Status: SHIPPED | OUTPATIENT
Start: 2023-08-21

## 2023-08-21 NOTE — TELEPHONE ENCOUNTER
From: Reji Carter. Hanny Kwon To: Elvira Walter. Rustychely Bojorquez,   Sent: 8/20/2023 9:51 PM CDT  Subject: Tramadol pain medication     Doctor Rusty Bojorquez I have an old medication tramadol 50 mg from dr Brenda Farmer from 2016 that I had had some left but had to start taking for my back pain. Would you be able to filll a bottle for me? My tylenol isn't helping. I'm waiting for my insurance to okay me to have some injections. Haven't heard from them yet. I am having a hard time sleeping at night with pain.  Would need it for nights and weekends thank you  Salena Dhillon

## 2023-08-31 RX ORDER — ROSUVASTATIN CALCIUM 10 MG/1
TABLET, COATED ORAL
Qty: 90 TABLET | Refills: 1 | OUTPATIENT
Start: 2023-08-31

## 2023-08-31 RX ORDER — TOPIRAMATE 25 MG/1
50 TABLET ORAL 2 TIMES DAILY
Qty: 360 TABLET | Refills: 1 | Status: SHIPPED | OUTPATIENT
Start: 2023-08-31

## 2023-08-31 RX ORDER — DILTIAZEM HYDROCHLORIDE 240 MG/1
CAPSULE, COATED, EXTENDED RELEASE ORAL
Qty: 90 CAPSULE | Refills: 1 | OUTPATIENT
Start: 2023-08-31

## 2023-09-07 ENCOUNTER — PATIENT MESSAGE (OUTPATIENT)
Dept: FAMILY MEDICINE CLINIC | Facility: CLINIC | Age: 63
End: 2023-09-07

## 2023-09-07 ENCOUNTER — TELEPHONE (OUTPATIENT)
Dept: FAMILY MEDICINE CLINIC | Facility: CLINIC | Age: 63
End: 2023-09-07

## 2023-09-07 RX ORDER — TRAMADOL HYDROCHLORIDE 50 MG/1
50 TABLET ORAL 2 TIMES DAILY PRN
Qty: 30 TABLET | Refills: 0 | Status: SHIPPED | OUTPATIENT
Start: 2023-09-07

## 2023-09-07 RX ORDER — TRAMADOL HYDROCHLORIDE 50 MG/1
50 TABLET ORAL NIGHTLY PRN
Qty: 15 TABLET | Refills: 0 | Status: CANCELLED
Start: 2023-09-07

## 2023-09-07 NOTE — TELEPHONE ENCOUNTER
From: Susan Blackburn. Hanny Kwon To: Vasyl Garcia. Simi Gutierrez DO  Sent: 9/7/2023 10:30 AM CDT  Subject: Tramadol    Dr Simi Gutierrez  My wife talked to your nurse ladt week about this med. Would you be able to fill it so I have enough for a couple for each night thruout the week nights and for day and night on weekends. Would that be about 25 or 30 in a bottle be okay? Thank you.  Shaq Bo

## 2023-09-07 NOTE — TELEPHONE ENCOUNTER
Pt requests RF on Tramadol. He sent a [facility] on 8/20 stating he had an old prescription from 2016, but asked if you would fill script. Said he was waiting for injections to be approved by insurance. *You sent a script for Tramadol #15 on 8/21. Wife called on 8/30 and said the insurance would only pay for #7 for the first prescription. (They didn't pick it up until then because he was finishing up the leftover tabs he had). Wife said they were still waiting for insurance coverage for injections. 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     *PT IS NOW ASKING FOR A SCRIPT FOR \"A COUPLE FOR EACH NIGHT THROUGHOUT THE WEEK NIGHTS AND [facility] DAY [facility]"

## 2023-09-07 NOTE — TELEPHONE ENCOUNTER
What about Erma Salamanca?     I think he's always gone by Mango---I think his middle name is Berkley Cloud

## 2023-09-08 ENCOUNTER — OFFICE VISIT (OUTPATIENT)
Dept: PAIN CLINIC | Facility: CLINIC | Age: 63
End: 2023-09-08
Payer: COMMERCIAL

## 2023-09-08 ENCOUNTER — NURSE ONLY (OUTPATIENT)
Dept: PAIN CLINIC | Facility: CLINIC | Age: 63
End: 2023-09-08
Payer: COMMERCIAL

## 2023-09-08 VITALS — DIASTOLIC BLOOD PRESSURE: 72 MMHG | SYSTOLIC BLOOD PRESSURE: 124 MMHG | OXYGEN SATURATION: 96 % | HEART RATE: 84 BPM

## 2023-09-08 DIAGNOSIS — M79.18 MYOFASCIAL PAIN: Primary | ICD-10-CM

## 2023-09-08 RX ORDER — BUPIVACAINE HYDROCHLORIDE 5 MG/ML
9 INJECTION, SOLUTION EPIDURAL; INTRACAUDAL ONCE
Status: COMPLETED | OUTPATIENT
Start: 2023-09-08 | End: 2023-09-08

## 2023-09-08 RX ORDER — TRIAMCINOLONE ACETONIDE 40 MG/ML
40 INJECTION, SUSPENSION INTRA-ARTICULAR; INTRAMUSCULAR ONCE
Status: COMPLETED | OUTPATIENT
Start: 2023-09-08 | End: 2023-09-08

## 2023-09-08 NOTE — PROCEDURES
Date of procedure: September 8, 2023    Preop diagnosis: Thoracic myofascial pain    Postop diagnosis: Same    Procedure: Right thoracic TPI    Surgeon: Zeynep Llanes    Anesthesia: None: 0    Indication: Patient is a 50-year-old gentleman with a history of  myofascial pain    Procedure detail: Informed consent was obtained. Timeout was done. The skin overlying the right thoracic spine was prepped in usual sterile fashion. Subsequently, a 27-gauge needle was used to deliver a total mixture of 40 mg triamcinolone with 9 cc of 0.5% bupivacaine to multiple trigger points. Muscles injected include the rhomboid, trapezius, and levator scapulae. Patient tolerated procedure well.     Zeynep Llanes MD

## 2023-09-08 NOTE — PROGRESS NOTES
Timeout completed prior to procedure @ 4995. Participants present for timeout:  Dr. Tenisha Cresw, RN Xin Ruvalcaba, and patient.

## 2023-09-08 NOTE — PROGRESS NOTES
Patient presents in office today with reported pain in upper right thoracic and cervical area    Current pain level reported = 6/10    Last reported dose of 1 Tramadol 50mg at 9am today      Narcotic Contract renewal NA    Urine Drug screen NA

## 2023-09-26 RX ORDER — TRAMADOL HYDROCHLORIDE 50 MG/1
50 TABLET ORAL 2 TIMES DAILY PRN
Qty: 30 TABLET | Refills: 0 | Status: SHIPPED | OUTPATIENT
Start: 2023-09-26

## 2023-09-26 NOTE — TELEPHONE ENCOUNTER
Last refill: 09/07/23  Qty: 30  W/ 0 refills  Last ov: 05/13/23    Requested Prescriptions     Pending Prescriptions Disp Refills    traMADol 50 MG Oral Tab 30 tablet 0     Sig: Take 1 tablet (50 mg total) by mouth 2 (two) times daily as needed for Pain.      Future Appointments   Date Time Provider Michi Estrella   11/25/2023  8:00 AM DO JOHN Fernandes [facility] [place]   12/20/2023  9:00 AM MD LULÚ Delgado [facility] [place]

## 2023-10-05 ENCOUNTER — TELEPHONE (OUTPATIENT)
Dept: FAMILY MEDICINE CLINIC | Facility: CLINIC | Age: 63
End: 2023-10-05

## 2023-10-05 NOTE — TELEPHONE ENCOUNTER
Spoke with patient who states back in may her  was told to decrease his Metformin to 500 mg daily instead of twice a day due to his A1c  being 6.0. Wife states that he tried that for a couple of days and he didn't feel right and went back to twice a day. He states he felt his sugar felt off, although he never did check it. He has been taking Metformin 500 mg bid since may. Ok to refill for twice a day? His next A1C is due 11/15/23. She has a script ready for the once a day at pharmacy. This nurse will cancel and send in the BID script if ok with Dr. Windy Batista.

## 2023-10-05 NOTE — TELEPHONE ENCOUNTER
Okay to continue metformin twice daily. Cancel daily prescription, will recheck A1c in November as scheduled. Darrell Fernandez.  Sandra Salgado

## 2023-10-05 NOTE — TELEPHONE ENCOUNTER
metFORMIN 500 MG Oral Tab   Wife is stating pt. Takes 1 tab 2 x daily so she is asking for a refill. He didn't feel right when he cut back to 1 tab daily so he went back to taking 2 tabs daily.    [facility]

## 2023-10-06 NOTE — TELEPHONE ENCOUNTER
Wife notified and verbalized understanding. Voicemail left at pharmacy to cancel the script for once daily and fill the BID script.

## 2023-10-18 RX ORDER — TRAMADOL HYDROCHLORIDE 50 MG/1
50 TABLET ORAL 2 TIMES DAILY PRN
Qty: 36 TABLET | Refills: 0 | Status: SHIPPED | OUTPATIENT
Start: 2023-10-18 | End: 2023-11-17

## 2023-10-18 NOTE — TELEPHONE ENCOUNTER
Last office visit: 5/13/23  Last refill: 9/26/23 qty: 30  Labs Due: 11/15/23  Future Appointments   Date Time Provider Michi Estrella   11/25/2023  8:00 AM Linh Bejarano., DO [facility] [place]   12/20/2023  9:00 AM MD LULÚ Ferrell [facility]

## 2023-10-30 RX ORDER — ROSUVASTATIN CALCIUM 10 MG/1
TABLET, COATED ORAL
Qty: 90 TABLET | Refills: 0 | Status: SHIPPED | OUTPATIENT
Start: 2023-10-30

## 2023-10-30 RX ORDER — DILTIAZEM HYDROCHLORIDE 240 MG/1
CAPSULE, COATED, EXTENDED RELEASE ORAL
Qty: 90 CAPSULE | Refills: 0 | Status: SHIPPED | OUTPATIENT
Start: 2023-10-30

## 2023-10-30 NOTE — TELEPHONE ENCOUNTER
Hypertension Medications Protocol Dsxjte33/29/2023 05:05 PM   Protocol Details CMP or BMP in past 12 months    Last serum creatinine< 2.0    Appointment in past 6 or next 3 months          Cholesterol Medication Protocol Rxvnpe88/29/2023 05:05 PM   Protocol Details ALT < 80    ALT resulted within past year    Lipid panel within past 12 months    Appointment within past 12 or next 3 months        Last office visit:  05/13/23   Diltiazem:  05/20/23  #90, 1 refill  Rosuvastatin:  05/12/23  #90, 1 refills  Last lipid:  11/19/22  Last cmp/bmp:  05/13/23    BP Readings from Last 3 Encounters:  09/08/23 : 124/72  08/11/23 : 132/70  05/24/23 : 118/76    Future Appointments   Date Time Provider Michi Estrella   11/25/2023  8:00 AM Supriya Mills., DO [facility] [place]

## 2023-11-09 RX ORDER — TRAMADOL HYDROCHLORIDE 50 MG/1
50 TABLET ORAL 2 TIMES DAILY PRN
Qty: 36 TABLET | Refills: 0 | Status: SHIPPED | OUTPATIENT
Start: 2023-11-09 | End: 2023-12-09

## 2023-11-09 NOTE — TELEPHONE ENCOUNTER
OV 05/13  REFILL 10/18 #36    Requested Prescriptions     Pending Prescriptions Disp Refills    traMADol 50 MG Oral Tab 36 tablet 0     Sig: Take 1 tablet (50 mg total) by mouth 2 (two) times daily as needed for Pain.      Future Appointments   Date Time Provider Michi Estrella   11/25/2023  8:00 AM Romulo Santacruz.,  [facility] [place]   12/20/2023  9:00 AM MD LULÚ Kelley [facility] [place]

## 2023-11-25 ENCOUNTER — OFFICE VISIT (OUTPATIENT)
Dept: FAMILY MEDICINE CLINIC | Facility: CLINIC | Age: 63
End: 2023-11-25
Payer: COMMERCIAL

## 2023-11-25 VITALS
BODY MASS INDEX: 31.96 KG/M2 | SYSTOLIC BLOOD PRESSURE: 126 MMHG | HEIGHT: 69 IN | WEIGHT: 215.81 LBS | OXYGEN SATURATION: 97 % | HEART RATE: 87 BPM | TEMPERATURE: 99 F | DIASTOLIC BLOOD PRESSURE: 78 MMHG

## 2023-11-25 DIAGNOSIS — J01.40 ACUTE NON-RECURRENT PANSINUSITIS: ICD-10-CM

## 2023-11-25 DIAGNOSIS — G99.2 MYELOPATHY CONCURRENT WITH AND DUE TO SPINAL STENOSIS OF THORACIC REGION (HCC): ICD-10-CM

## 2023-11-25 DIAGNOSIS — M17.31 POST-TRAUMATIC OSTEOARTHRITIS OF RIGHT KNEE: ICD-10-CM

## 2023-11-25 DIAGNOSIS — E11.9 TYPE 2 DIABETES MELLITUS WITHOUT COMPLICATION, WITHOUT LONG-TERM CURRENT USE OF INSULIN (HCC): Primary | ICD-10-CM

## 2023-11-25 DIAGNOSIS — F17.200 TOBACCO DEPENDENCE: ICD-10-CM

## 2023-11-25 DIAGNOSIS — Z28.21 VACCINATION NOT CARRIED OUT BECAUSE OF PATIENT REFUSAL: ICD-10-CM

## 2023-11-25 DIAGNOSIS — K21.9 GASTROESOPHAGEAL REFLUX DISEASE WITHOUT ESOPHAGITIS: ICD-10-CM

## 2023-11-25 DIAGNOSIS — Z79.01 CURRENT USE OF LONG TERM ANTICOAGULATION: ICD-10-CM

## 2023-11-25 DIAGNOSIS — K59.01 SLOW TRANSIT CONSTIPATION: ICD-10-CM

## 2023-11-25 DIAGNOSIS — G25.0 ESSENTIAL TREMOR: ICD-10-CM

## 2023-11-25 DIAGNOSIS — I10 ESSENTIAL HYPERTENSION: ICD-10-CM

## 2023-11-25 DIAGNOSIS — E78.00 HYPERCHOLESTEREMIA: ICD-10-CM

## 2023-11-25 DIAGNOSIS — M48.04 MYELOPATHY CONCURRENT WITH AND DUE TO SPINAL STENOSIS OF THORACIC REGION (HCC): ICD-10-CM

## 2023-11-25 DIAGNOSIS — Z12.11 COLON CANCER SCREENING: ICD-10-CM

## 2023-11-25 DIAGNOSIS — I25.10 CORONARY ARTERY CALCIFICATION SEEN ON CAT SCAN: ICD-10-CM

## 2023-11-25 DIAGNOSIS — I48.92 ATRIAL FIBRILLATION AND FLUTTER (HCC): ICD-10-CM

## 2023-11-25 DIAGNOSIS — I48.91 ATRIAL FIBRILLATION AND FLUTTER (HCC): ICD-10-CM

## 2023-11-25 LAB
ALBUMIN SERPL-MCNC: 3.6 G/DL (ref 3.4–5)
ALBUMIN/GLOB SERPL: 1.3 {RATIO} (ref 1–2)
ALP LIVER SERPL-CCNC: 75 U/L
ALT SERPL-CCNC: 43 U/L
ANION GAP SERPL CALC-SCNC: 2 MMOL/L (ref 0–18)
AST SERPL-CCNC: 19 U/L (ref 15–37)
BILIRUB SERPL-MCNC: 0.4 MG/DL (ref 0.1–2)
BUN BLD-MCNC: 21 MG/DL (ref 9–23)
CALCIUM BLD-MCNC: 8.8 MG/DL (ref 8.5–10.1)
CHLORIDE SERPL-SCNC: 118 MMOL/L (ref 98–112)
CHOLEST SERPL-MCNC: 110 MG/DL (ref ?–200)
CO2 SERPL-SCNC: 25 MMOL/L (ref 21–32)
CREAT BLD-MCNC: 1.1 MG/DL
EGFRCR SERPLBLD CKD-EPI 2021: 76 ML/MIN/1.73M2 (ref 60–?)
EST. AVERAGE GLUCOSE BLD GHB EST-MCNC: 134 MG/DL (ref 68–126)
GLOBULIN PLAS-MCNC: 2.7 G/DL (ref 2.8–4.4)
GLUCOSE BLD-MCNC: 104 MG/DL (ref 70–99)
HBA1C MFR BLD: 6.3 % (ref ?–5.7)
HDLC SERPL-MCNC: 31 MG/DL (ref 40–59)
LDLC SERPL CALC-MCNC: 66 MG/DL (ref ?–100)
NONHDLC SERPL-MCNC: 79 MG/DL (ref ?–130)
OSMOLALITY SERPL CALC.SUM OF ELEC: 303 MOSM/KG (ref 275–295)
POTASSIUM SERPL-SCNC: 4.4 MMOL/L (ref 3.5–5.1)
PROT SERPL-MCNC: 6.3 G/DL (ref 6.4–8.2)
SODIUM SERPL-SCNC: 145 MMOL/L (ref 136–145)
TRIGL SERPL-MCNC: 57 MG/DL (ref 30–149)
VLDLC SERPL CALC-MCNC: 9 MG/DL (ref 0–30)

## 2023-11-25 PROCEDURE — 99215 OFFICE O/P EST HI 40 MIN: CPT | Performed by: FAMILY MEDICINE

## 2023-11-25 PROCEDURE — 3078F DIAST BP <80 MM HG: CPT | Performed by: FAMILY MEDICINE

## 2023-11-25 PROCEDURE — 3072F LOW RISK FOR RETINOPATHY: CPT | Performed by: FAMILY MEDICINE

## 2023-11-25 PROCEDURE — 3008F BODY MASS INDEX DOCD: CPT | Performed by: FAMILY MEDICINE

## 2023-11-25 PROCEDURE — 3074F SYST BP LT 130 MM HG: CPT | Performed by: FAMILY MEDICINE

## 2023-11-25 RX ORDER — AMOXICILLIN AND CLAVULANATE POTASSIUM 875; 125 MG/1; MG/1
1 TABLET, FILM COATED ORAL 2 TIMES DAILY
Qty: 20 TABLET | Refills: 0 | Status: SHIPPED | OUTPATIENT
Start: 2023-11-25 | End: 2023-12-05

## 2023-11-25 NOTE — PATIENT INSTRUCTIONS
1. Type 2 diabetes mellitus without complication, without long-term current use of insulin ([facility] [address]  I reviewed goals for fasting and postmeal blood sugars as well as hemoglobin A1c. Discussed importance of annual dilated retinal exams and glaucoma screening as well as routine foot exams and good fitting footwear. Will check labs today. Patient again strongly encouraged to monitor his blood sugar alternating fasting with 2-hour postmeal readings daily  - Comp Metabolic Panel (14) [E]; Future  - Hemoglobin A1C [E]; Future  - Comp Metabolic Panel (14) [E]  - Hemoglobin A1C [E]    2. Essential hypertension  I reviewed goals for blood pressure as well as conservative management of hypertension including sodium restriction, daily aerobic activity, alcohol moderation, smoking cessation, and maintaining ideal body weight. Continue current meds. Patient strongly encouraged to begin monitoring blood pressure at home  - Comp Metabolic Panel (14) [E]; Future  - Comp Metabolic Panel (14) [E]    3. Hypercholesteremia  Reviewed goals for lipids. Continue statin therapy, check labs annually  - Lipid Panel [E]; Future  - Lipid Panel [E]    4. Gastroesophageal reflux disease without esophagitis  Anti-reflux measures reviewed. Avoid large meals. Allow at least 3 hrs between eating and laying down to facilitate gastric emptying. Limit caffeine to 2 servings per day. Avoid spicy or acidic foods and liquids. Moderation of alcohol. Avoid nsaids and aspirin. May use Tylenol prn pain  Continue lansoprazole    5. Atrial fibrillation and flutter (HCC)  Continue diltiazem and anticoagulation    6. Current use of long term anticoagulation  Continue daily anticoagulation    7.  Tobacco dependence  Patient again strongly counseled on smoking cessation strategies, behavior modification, medications, risk, benefits, side effects and anticipated response, patient declines need for any additional medication  - CT LUNG LD SCREENING(CPT=71271); Future    8. Post-traumatic osteoarthritis of right knee  Monitor clinically, activity as tolerated, pain management strategies reviewed    9. Colon cancer screening  Again reviewed current guidelines recommendations for colon cancer screening. Prior colonoscopy and pathology report reviewed. Patient encouraged to complete Cologuard as previously ordered last year    8. Coronary artery calcification seen on CAT scan  Discussed importance of good blood pressure, blood sugar and lipid control as well as smoking cessation, monitor clinically    11. Vaccination not carried out because of patient refusal  Patient again refuses all vaccines despite counseling    12. Essential tremor  Continue topiramate, monitor clinically    13. Acute non-recurrent pansinusitis  Recommend use of nasal saline, proper intranasal administration reviewed  Augmentin 875 mg twice daily x 10 days    14. Myelopathy concurrent with and due to spinal stenosis of thoracic region [facility])  Follow-up with specialist as scheduled    15. Slow transit constipation  Discussed dietary modification and the importance of increased daily fluid intake.   Discussed benefits of daily fiber supplement and MiraLAX

## 2023-11-27 DIAGNOSIS — E78.00 HYPERCHOLESTEREMIA: Primary | ICD-10-CM

## 2023-11-27 DIAGNOSIS — E11.9 TYPE 2 DIABETES MELLITUS WITHOUT COMPLICATION, WITHOUT LONG-TERM CURRENT USE OF INSULIN (HCC): ICD-10-CM

## 2023-11-28 ENCOUNTER — MED REC SCAN ONLY (OUTPATIENT)
Dept: FAMILY MEDICINE CLINIC | Facility: CLINIC | Age: 63
End: 2023-11-28

## 2023-12-02 NOTE — TELEPHONE ENCOUNTER
OV 11/25  REFILL 11/2022 #90 +3 RF  LABS 11/25    Requested Prescriptions     Pending Prescriptions Disp Refills    XARELTO 20 MG Oral Tab [Pharmacy Med Name: Layman Bitters 20 MG TABLET] 90 tablet 3     Sig: TAKE 1 TABLET BY MOUTH EVERY DAY     Future Appointments   Date Time Provider Michi Delores   12/20/2023  9:00 AM MD LULÚ [place] [facility] [place]   6/15/2024  8:00 AM Nelly Heredia,  [facility] [place]

## 2023-12-04 ENCOUNTER — MED REC SCAN ONLY (OUTPATIENT)
Dept: FAMILY MEDICINE CLINIC | Facility: CLINIC | Age: 63
End: 2023-12-04

## 2023-12-04 ENCOUNTER — TELEPHONE (OUTPATIENT)
Dept: FAMILY MEDICINE CLINIC | Facility: CLINIC | Age: 63
End: 2023-12-04

## 2023-12-11 RX ORDER — TRAMADOL HYDROCHLORIDE 50 MG/1
50 TABLET ORAL 2 TIMES DAILY PRN
Qty: 36 TABLET | Refills: 0 | Status: SHIPPED | OUTPATIENT
Start: 2023-12-11 | End: 2024-01-10

## 2023-12-11 NOTE — TELEPHONE ENCOUNTER
Last refill: 11/09/23  Qty: 36  W/ 0 refills  Last ov: 11/25/23    Requested Prescriptions     Pending Prescriptions Disp Refills    traMADol 50 MG Oral Tab 36 tablet 0     Sig: Take 1 tablet (50 mg total) by mouth 2 (two) times daily as needed for Pain.      Future Appointments   Date Time Provider Michi Estrella   12/16/2023  7:00 AM PF CT 1 PF [facility]   12/20/2023  9:00 AM MD LULÚ Delgado [facility]   6/15/2024  8:00 AM Ana Muro DO [facility] [place]

## 2023-12-16 ENCOUNTER — HOSPITAL ENCOUNTER (OUTPATIENT)
Dept: CT IMAGING | Age: 63
Discharge: HOME | End: 2023-12-16
Attending: FAMILY MEDICINE
Payer: COMMERCIAL

## 2023-12-16 DIAGNOSIS — F17.200 TOBACCO DEPENDENCE: ICD-10-CM

## 2023-12-16 PROCEDURE — 71271 CT THORAX LUNG CANCER SCR C-: CPT | Performed by: FAMILY MEDICINE

## 2023-12-20 ENCOUNTER — OFFICE VISIT (OUTPATIENT)
Dept: NEUROLOGY | Facility: CLINIC | Age: 63
End: 2023-12-20
Payer: COMMERCIAL

## 2023-12-20 VITALS
DIASTOLIC BLOOD PRESSURE: 78 MMHG | RESPIRATION RATE: 16 BRPM | WEIGHT: 215 LBS | BODY MASS INDEX: 31.84 KG/M2 | SYSTOLIC BLOOD PRESSURE: 100 MMHG | HEIGHT: 69 IN | HEART RATE: 62 BPM

## 2023-12-20 DIAGNOSIS — G25.0 ESSENTIAL TREMOR: Primary | ICD-10-CM

## 2023-12-20 PROCEDURE — 3078F DIAST BP <80 MM HG: CPT | Performed by: OTHER

## 2023-12-20 PROCEDURE — 3008F BODY MASS INDEX DOCD: CPT | Performed by: OTHER

## 2023-12-20 PROCEDURE — 3074F SYST BP LT 130 MM HG: CPT | Performed by: OTHER

## 2023-12-20 PROCEDURE — 99214 OFFICE O/P EST MOD 30 MIN: CPT | Performed by: OTHER

## 2023-12-20 RX ORDER — TOPIRAMATE 25 MG/1
75 TABLET ORAL 2 TIMES DAILY
Qty: 540 TABLET | Refills: 3 | Status: SHIPPED | OUTPATIENT
Start: 2023-12-20

## 2024-01-02 NOTE — TELEPHONE ENCOUNTER
Last refill: 10/05/23  Qty: 180  W/ 0 refills  Last ov: 11/25/23    Requested Prescriptions     Pending Prescriptions Disp Refills    METFORMIN 500 MG Oral Tab [Pharmacy Med Name: METFORMIN  MG TABLET] 180 tablet 0     Sig: TAKE 1 TABLET BY MOUTH TWICE A DAY WITH MEALS     Future Appointments   Date Time Provider Department Center   6/15/2024  8:00 AM Carlton Sosa, DO [facility] [place]

## 2024-01-07 DIAGNOSIS — M17.31 POST-TRAUMATIC OSTEOARTHRITIS OF RIGHT KNEE: Primary | ICD-10-CM

## 2024-01-08 RX ORDER — TRAMADOL HYDROCHLORIDE 50 MG/1
50 TABLET ORAL 2 TIMES DAILY PRN
Qty: 36 TABLET | Refills: 0 | Status: SHIPPED | OUTPATIENT
Start: 2024-01-08 | End: 2024-02-07

## 2024-01-08 NOTE — TELEPHONE ENCOUNTER
Requested Prescriptions     Pending Prescriptions Disp Refills    traMADol 50 MG Oral Tab 36 tablet 0     Sig: Take 1 tablet (50 mg total) by mouth 2 (two) times daily as needed for Pain.     Last refill 12/11/23 #36  LOV 11/25/23  Future Appointments   Date Time Provider Department Center   6/15/2024  8:00 AM Carlton Sosa, DO [facility] [place]        TRAVEL CONSULTATION   Date of visit: 7/18/2023    SUBJECTIVE:   Emil Peraza is a 25 year old male who will be traveling to [facility] and [place].  Dates of travel:  8/30/23 - 9/11/23 (12 days).   Activities during trip: vacation, visit friends.     Reviewed and discussed items on the TRAVEL MEDICAL QUESTIONNAIRE form patient has filled out (sent to be scanned into Epic).   ALLERGIES:  No Known Allergies  Current Outpatient Medications   Medication Sig Dispense Refill   • azithromycin (Zithromax) 500 MG tablet Take 1 tablet by mouth daily. Take for 3 days.  Start with onset of watery diarrhea. 3 tablet 0     No current facility-administered medications for this visit.         Couseling:   • Key points were summarized in After Visit Summary page as well as the list of recommended vaccines.      • Immunizations risks and benefits were fully discussed.  VIS for each of the vaccines were given.    • Patient verbalized understanding of the risks of the YF disease as well as risks/benefits of the vaccination, and that for most people serious yellow fever vaccine related adverse events are rare, but risks increase with age.  Patient is aware that the side effects to this can include death, and would like to proceed with the vaccination.  For those 60 years and older, discussed that administering the vaccine is conditional at this age:  weighing benefits v risks, considering traveler's medical conditions, medications, any treatments, and the specific itinerary planned into the area with significant risk for yellow fever.  Patient has no additional questions at this time.       • TRAVEL COUNSELING form used for review and recommendation (sent to be scanned).  Printed Travax report for this specific itinerary were utilized and given to traveler in today's discussion.       • Patient was seen face to face for more than 30 minutes, all of which was spent providing counseling.    Encounter Diagnoses   Name Primary?   •  Counseling for travel Yes   • Need for vaccination      Orders Placed This Encounter   • Yellow Fever Live Vacc, SQ (Only at Approved Sites)   • Typhoid Vacc, IM   • azithromycin (Zithromax) 500 MG tablet     Sig: Take 1 tablet by mouth daily. Take for 3 days.  Start with onset of watery diarrhea.     Dispense:  3 tablet     Refill:  0

## 2024-01-24 RX ORDER — DILTIAZEM HYDROCHLORIDE 240 MG/1
CAPSULE, COATED, EXTENDED RELEASE ORAL
Qty: 90 CAPSULE | Refills: 0 | Status: SHIPPED | OUTPATIENT
Start: 2024-01-24

## 2024-01-24 RX ORDER — ROSUVASTATIN CALCIUM 10 MG/1
TABLET, COATED ORAL
Qty: 90 TABLET | Refills: 0 | Status: SHIPPED | OUTPATIENT
Start: 2024-01-24

## 2024-01-24 NOTE — TELEPHONE ENCOUNTER
Last office visit:11/25/23    Future Appointments   Date Time Provider Department Center   6/15/2024  8:00 AM Carlton Sosa, DO [facility] [place]   Last filled: Ditiazem 10/30/23 #90 with 0 refils  Rosuvastatin; 10/30/23 #90 with 0 refills   Last labs:    Protocol-    Hypertension Medications Protocol Ctgorn3901/24/2024 12:34 AM   Protocol Details CMP or BMP in past 12 months    Last serum creatinine< 2.0    Appointment in past 6 or next 3 months          Cholesterol Medication Protocol Amwunz9201/24/2024 12:34 AM   Protocol Details ALT < 80    ALT resulted within past year    Lipid panel within past 12 months    Appointment within past 12 or next 3 months

## 2024-02-09 DIAGNOSIS — M17.31 POST-TRAUMATIC OSTEOARTHRITIS OF RIGHT KNEE: ICD-10-CM

## 2024-02-09 RX ORDER — TRAMADOL HYDROCHLORIDE 50 MG/1
50 TABLET ORAL 2 TIMES DAILY PRN
Qty: 36 TABLET | Refills: 0 | Status: SHIPPED | OUTPATIENT
Start: 2024-02-09 | End: 2024-03-10

## 2024-02-09 NOTE — TELEPHONE ENCOUNTER
Last office visit: 11/25/23   Future Appointments   Date Time Provider Department Center   6/15/2024  8:00 AM Carlton Sosa, DO [facility] [place] [facility]   Last filled: 1/8/24 #36 with 0 Refills   Take 1 tablet 2x daily prn   Last labs: 11/25/23

## 2024-03-05 DIAGNOSIS — M17.31 POST-TRAUMATIC OSTEOARTHRITIS OF RIGHT KNEE: ICD-10-CM

## 2024-03-05 NOTE — TELEPHONE ENCOUNTER
Last office visit: 11/25/23   Future Appointments   Date Time Provider Department Center   6/15/2024  8:00 AM Carlton Sosa, DO [facility]   Last filled: 2/9/24 #36 with 0 RF  Last labs: 11/25/23       Protocol:     Controlled Substance Medication Estduu6203/05/2024 07:43 AM    This medication is a controlled substance - forward to provider to refil

## 2024-03-06 RX ORDER — TRAMADOL HYDROCHLORIDE 50 MG/1
50 TABLET ORAL 2 TIMES DAILY PRN
Qty: 36 TABLET | Refills: 0 | Status: SHIPPED | OUTPATIENT
Start: 2024-03-06 | End: 2024-04-05

## 2024-03-07 RX ORDER — LANSOPRAZOLE 30 MG/1
30 CAPSULE, DELAYED RELEASE ORAL
Qty: 90 CAPSULE | Refills: 3 | Status: SHIPPED | OUTPATIENT
Start: 2024-03-07

## 2024-03-07 NOTE — TELEPHONE ENCOUNTER
Last refill: 01/30/23  Qty: 90  W/ 3 refills  Last ov: 11/25/23    Requested Prescriptions     Pending Prescriptions Disp Refills    LANSOPRAZOLE 30 MG Oral Capsule Delayed Release [Pharmacy Med Name: LANSOPRAZOLE DR 30 MG CAPSULE] 90 capsule 3     Sig: TAKE 1 CAPSULE (30 MG TOTAL) BY MOUTH BEFORE BREAKFAST.     Future Appointments   Date Time Provider Department Center   6/15/2024  8:00 AM Carlton Sosa, DO [facility] [place]

## 2024-03-19 ENCOUNTER — OFFICE VISIT (OUTPATIENT)
Dept: PAIN CLINIC | Facility: CLINIC | Age: 64
End: 2024-03-19
Payer: COMMERCIAL

## 2024-03-19 VITALS — HEART RATE: 73 BPM | OXYGEN SATURATION: 97 % | DIASTOLIC BLOOD PRESSURE: 62 MMHG | SYSTOLIC BLOOD PRESSURE: 118 MMHG

## 2024-03-19 DIAGNOSIS — M25.511 ACUTE PAIN OF RIGHT SHOULDER: Primary | ICD-10-CM

## 2024-03-19 PROCEDURE — 99214 OFFICE O/P EST MOD 30 MIN: CPT | Performed by: PHYSICIAN ASSISTANT

## 2024-03-19 PROCEDURE — 3078F DIAST BP <80 MM HG: CPT | Performed by: PHYSICIAN ASSISTANT

## 2024-03-19 PROCEDURE — 3074F SYST BP LT 130 MM HG: CPT | Performed by: PHYSICIAN ASSISTANT

## 2024-03-19 NOTE — PATIENT INSTRUCTIONS
Refill policies:    Allow 2-3 business days for refills; controlled substances may take longer.  Contact your pharmacy at least 5 days prior to running out of medication and have them send an electronic request or submit request through the “request refill” option in your [employer] account.  Refills are not addressed on weekends; covering physicians do not authorize routine medications on weekends.  No narcotics or controlled substances are refilled after noon on Fridays or by on call physicians.  By law, narcotics must be electronically prescribed.  A 30 day supply with no refills is the maximum allowed.  If your prescription is due for a refill, you may be due for a follow up appointment.  To best provide you care, patients receiving routine medications need to be seen at least once a year.  Patients receiving narcotic/controlled substance medications need to be seen at least once every 3 months.  In the event that your preferred pharmacy does not have the requested medication in stock (e.g. Backordered), it is your responsibility to find another pharmacy that has the requested medication available.  We will gladly send a new prescription to that pharmacy at your request.    Scheduling Tests:    If your physician has ordered radiology tests such as MRI or CT scans, please contact Central Scheduling at [phone] right away to schedule the test.  Once scheduled, the [facility] Centralized Referral Team will work with your insurance carrier to obtain pre-certification or prior authorization.  Depending on your insurance carrier, approval may take 3-10 days.  It is highly recommended patients assure they have received an authorization before having a test performed.  If test is done without insurance authorization, patient may be responsible for the entire amount billed.      Precertification and Prior Authorizations:  If your physician has recommended that you have a procedure or additional testing performed the [facility]  Centralized Referral Team will contact your insurance carrier to obtain pre-certification or prior authorization.    You are strongly encouraged to contact your insurance carrier to verify that your procedure/test has been approved and is a COVERED benefit.  Although the [facility] Centralized Referral Team does its due diligence, the insurance carrier gives the disclaimer that \"Although the procedure is authorized, this does not guarantee payment.\"    Ultimately the patient is responsible for payment.   Thank you for your understanding in this matter.  Paperwork Completion:  If you require FMLA or disability paperwork for your recovery, please make sure to either drop it off or have it faxed to our office at [phone]. Be sure the form has your name and date of birth on it.  The form will be faxed to our Forms Department and they will complete it for you.  There is a 25$ fee for all forms that need to be filled out.  Please be aware there is a 10-14 day turnaround time.  You will need to sign a release of information (LANDON) form if your paperwork does not come with one.  You may call the Forms Department with any questions at [phone].  Their fax number is [phone].

## 2024-03-19 NOTE — PROGRESS NOTES
HPI:   Gabriel Gamino Jr. presents with complaints of R shoulder pain.    The pain is described as severe aching, stabbing that is intermittent.  The patient’s activity level has remained the same since last visit.  The pain is worst unrelated to time of day.    Changes in condition/history since last visit: Patient is here today for follow-up, having been seen last on 9/8/2023 for right sided trigger point injections.  Procedure was well-tolerated, and had no adverse effects.  Overall, reports good relief, and for his initial myofascial complaints, he has been 80% improved.      Over the past few weeks, has had progressive increase in R shoulder pain, now associated with significant reduction in ROM.  He had been evaluated by ortho in the past, though has not followed with Dr. Harris since onset of current complaints.  Here to discuss shoulder injection.      Of note, he does have known and significant C5-6 and C6-7 cervical stenosis, as well as thoracic stenosis with myelomalacia at the T2-3.  He had previously been evaluated by [facility] pain management, Dr. Ames, who had considered epidural steroid injections, though based on the degree of stenosis, declined to proceed.  He had been evaluated by spine surgery, Dr. Boles, who had considered decompressive cervical surgery, but did not feel as though his cervical issues would warrant aggressive options.    Last procedure: TPI    date: 9/8/2023    Percentage of relief experienced from the procedure: 80%    Duration of the relief: sustained    The following activities will increase the patient’s pain: ROM R UE    The following activities decrease the patient’s pain: limiting activity level    Functional Assessment: Patient reports that they are able to complete all of their ADL's such as eating, bathing, using the toilet, dressing and getting up from a bed or a chair independently.    Current Medications:  Current Outpatient Medications   Medication  Sig Dispense Refill    LANSOPRAZOLE 30 MG Oral Capsule Delayed Release TAKE 1 CAPSULE (30 MG TOTAL) BY MOUTH BEFORE BREAKFAST. 90 capsule 3    traMADol 50 MG Oral Tab Take 1 tablet (50 mg total) by mouth 2 (two) times daily as needed for Pain. 36 tablet 0    DILTIAZEM  MG Oral Capsule SR 24 Hr TAKE 1 CAPSULE BY MOUTH EVERY DAY 90 capsule 0    ROSUVASTATIN 10 MG Oral Tab TAKE 1 TABLET BY MOUTH EVERY DAY IN THE EVENING 90 tablet 0    METFORMIN 500 MG Oral Tab TAKE 1 TABLET BY MOUTH TWICE A DAY WITH MEALS 180 tablet 0    topiramate 25 MG Oral Tab Take 3 tablets (75 mg total) by mouth 2 (two) times daily. 540 tablet 3    rivaroxaban (XARELTO) 20 MG Oral Tab Take 1 tablet (20 mg total) by mouth daily. 90 tablet 3    diclofenac 50 MG Oral Tab EC Take 1 tablet (50 mg total) by mouth 2 (two) times daily. 180 tablet 1    Solifenacin Succinate 5 MG Oral Tab Take 1 tablet (5 mg total) by mouth daily. 90 tablet 3    TOPIRAMATE 25 MG Oral Tab TAKE 2 TABLETS BY MOUTH TWICE A  tablet 0    Vitamin C 500 MG Oral Tab Take 1 tablet (500 mg total) by mouth in the morning and 1 tablet (500 mg total) before bedtime.        Patient requires assistance with: No assistance required    Reviewed Patient History Dated: 9/8/23 no changes noted    Physical Exam:   /62 (BP Location: Right arm, Patient Position: Sitting, Cuff Size: adult)   Pulse 73   SpO2 97%   VAS Pain Score:  4-10/10  General Appearance: Well developed, well nourished, normal build, independent body habitus, no apparent physical disabilities, well groomed    Neurological Exam: WNL-Orientation to time, place and person, normal mood & effect, normal concentration & attention span  Inspection: non-antalgic, no acute distress  Pain with range of motion right shoulder with abduction and external rotation.  Significant reduction and range of motion with right behind the back reach, unable to reach his waistline.  Radiology/Lab Test Reviewed: MRI C spine:      IMPRESSION:   1. Multilevel cervical spondylosis which is most extensive at the C5-C6 level where there is   moderate central canal stenosis, as well as C5-C6 and C7-T1 and to a slightly lesser degree C6-C7 where there is advanced (moderate to severe) neural foraminal stenosis.   2.  Partially evaluated thoracic cord abnormality extending from the T1-T2 vertebral levels.   Myelopathy versus demyelinating disease are a few considerations but complete evaluation with dedicated contrast enhanced thoracic spine MRI is recommended for further assessment.               MRI T spine:        IMPRESSION:      1. Abnormal cord signal within the proximal thoracic cord secondary to severe central canal stenosis at T2-T3 consistent with compressive myelopathy.   2. 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       Lab Results   Component Value Date    WBC 8.9 03/24/2017    WBC 7.5 01/23/2016    WBC 9.2 12/16/2015   No results found for: \"HEMOGLOBIN\"  Lab Results   Component Value Date    .0 03/24/2017     01/23/2016     12/16/2015     Do you have any known blood/bleeding disorders?  No  Does patient currently take blood thinners?   Other: xarelto  - last taken on c  Does patient currently take any antibiotics?   No  Patient educated and verbalized understanding.  Medical Decision Making:   Diagnosis:    Encounter Diagnosis   Name Primary?    Acute pain of right shoulder Yes     Impression: Patient did very well with trigger point injections, and at this time, his previous complaints of right thoracic and rhomboid pain are largely resolved.  Over the past 3 weeks, has had rapid onset of right shoulder pain, reproduced with range of motion of the shoulder (abduction and external rotation) with profound reduction in range of motion with right behind the back reach, being unable to even reach his waistline.  He had been evaluated by Ortho, Dr. Harris, in the  past, though since the acute onset of the shoulder pain, has not returned.  States that he is looking to get a shoulder injection is soon as possible, and to that end decided to come here for the procedure.  While we can certainly offer a right intra-articular shoulder injection, did ask that he also follow-up with Ortho, in the event that the injection produces only brief improvement.    Plan: Patient to schedule the following injection: Right intra-articular shoulder injection Levels: N/A, Procedure and risks were discussed with pt. including headache, bleeding, infection and potential nerve damage.  Patient is on Xarelto, and states that for all of his previous orthopedic injections, needed to hold it, and would be more comfortable holding it prior to the procedure.  To that end, we will obtain blood thinner clearance, and proceed with right intra-articular shoulder injection.  Follow-up with Ortho for further management.    No orders of the defined types were placed in this encounter.      Meds & Refills for this Visit:  Requested Prescriptions      No prescriptions requested or ordered in this encounter       Imaging & Consults:  None    The patient indicates understanding of these issues and agrees to the plan.    ROBERT Calvo

## 2024-03-19 NOTE — PROGRESS NOTES
Patient presents in office today with reported pain in right shoulder (chief complaint), neck, upper and lower back, knees    Current pain level reported = 4/10    Last reported dose of n/a      Narcotic Contract renewal n/a    Urine Drug screen n/a

## 2024-03-20 ENCOUNTER — TELEPHONE (OUTPATIENT)
Dept: PAIN CLINIC | Facility: CLINIC | Age: 64
End: 2024-03-20

## 2024-03-20 NOTE — TELEPHONE ENCOUNTER
Patient's spouse calling stating that the patient was able to have his injection performed by Dr. Harris and that the order is not needed. Endorsed to RN.

## 2024-04-03 NOTE — TELEPHONE ENCOUNTER
Last office visit:11/25/23   Future Appointments   Date Time Provider Department Center   6/15/2024  8:00 AM Carlton Sosa, DO [facility] [place]   Last filled:1/2/24 #180 with 0 RF   Last labs: 11/25/23     Protocol:    Diabetes Medication Protocol Nrzncg9304/03/2024 01:13 AM   Protocol Details Last A1C < 7.5 and within past 6 months    In person appointment or virtual visit in the past 6 mos or appointment in next 3 mos    Microalbumin procedure in past 12 months or taking ACE/ARB    EGFRCR or GFRNAA > 50    GFR in the past 12 months

## 2024-04-04 DIAGNOSIS — M17.31 POST-TRAUMATIC OSTEOARTHRITIS OF RIGHT KNEE: ICD-10-CM

## 2024-04-04 RX ORDER — TRAMADOL HYDROCHLORIDE 50 MG/1
50 TABLET ORAL 2 TIMES DAILY PRN
Qty: 36 TABLET | Refills: 0 | Status: SHIPPED | OUTPATIENT
Start: 2024-04-04 | End: 2024-05-04

## 2024-04-04 NOTE — TELEPHONE ENCOUNTER
Last refill: 03/06/24  qtY: 36  W/ 0 refills  Last ov: 11/25/23    Requested Prescriptions     Pending Prescriptions Disp Refills    traMADol 50 MG Oral Tab 36 tablet 0     Sig: Take 1 tablet (50 mg total) by mouth 2 (two) times daily as needed for Pain.     Future Appointments   Date Time Provider Department Center   6/15/2024  8:00 AM Carlton Sosa, DO [facility] [place]

## 2024-04-25 RX ORDER — ROSUVASTATIN CALCIUM 10 MG/1
10 TABLET, COATED ORAL EVERY EVENING
Qty: 90 TABLET | Refills: 1 | Status: SHIPPED | OUTPATIENT
Start: 2024-04-25

## 2024-04-25 RX ORDER — DILTIAZEM HYDROCHLORIDE 240 MG/1
240 CAPSULE, COATED, EXTENDED RELEASE ORAL DAILY
Qty: 90 CAPSULE | Refills: 3 | Status: SHIPPED | OUTPATIENT
Start: 2024-04-25

## 2024-04-25 NOTE — TELEPHONE ENCOUNTER
Last office visit: 11/25/23  Last refill: Diclofenac: 8/31/23, Diltiazem, Rosuvastatin: 1/24/24  Labs Due: 5/27/24  Future Appointments   Date Time Provider Department Center   6/15/2024  8:00 AM Carlton Sosa DO [facility] [place]      Name from pharmacy: DICLOFENAC SOD EC 50 MG TAB         Will file in chart as: DICLOFENAC 50 MG Oral Tab EC    Sig: TAKE 1 TABLET BY MOUTH TWICE A DAY    Disp: 180 tablet    Refills: 1    Start: 4/25/2024    Class: Normal    Non-formulary    Last ordered: 7 months ago (8/31/2023) by Carlton Sosa DO    Last refill: 1/25/2024    Rx #: [id number]    Non-Narcotic Pain Medication Protocol Rbpmsi0704/25/2024 12:42 AM   Protocol Details In person appointment or virtual visit in the past 6 mos or appointment in next 3 mos       Name from pharmacy: ROSUVASTATIN CALCIUM 10 MG TAB         Will file in chart as: ROSUVASTATIN 10 MG Oral Tab    Sig: TAKE 1 TABLET BY MOUTH EVERY DAY IN THE EVENING    Disp: 90 tablet    Refills: 0 (Pharmacy requested: Not specified)    Start: 4/25/2024    Class: Normal    Last ordered: 3 months ago (1/24/2024) by Carlton Sosa DO    Last refill: 1/24/2024    Rx #: [id number]    Cholesterol Medication Protocol Dlzxfe9704/25/2024 12:42 AM   Protocol Details ALT < 80    ALT resulted within past year    Lipid panel within past 12 months    In person appointment or virtual visit in the past 12 mos or appointment in next 3 mos       Name from pharmacy: DILTIAZEM 24H ER(CD) 240 MG CP         Will file in chart as: DILTIAZEM  MG Oral Capsule SR 24 Hr    Sig: TAKE 1 CAPSULE BY MOUTH EVERY DAY    Disp: 90 capsule    Refills: 0 (Pharmacy requested: Not specified)    Start: 4/25/2024    Class: Normal    Last ordered: 3 months ago (1/24/2024) by Carlton Sosa DO    Last refill: 1/24/2024    Rx #: [id number]    Hypertension Medications Protocol Ikidav5804/25/2024 12:42 AM   Protocol Details CMP or BMP in past 12 months    Last BP reading less than 140/90    In  person appointment or virtual visit in the past 12 mos or appointment in next 3 mos    EGFRCR or GFRNAA > 50      To be filled at: [facility] [id number] [place], [place] - [facility] [phone], [phone]

## 2024-04-28 DIAGNOSIS — M17.31 POST-TRAUMATIC OSTEOARTHRITIS OF RIGHT KNEE: ICD-10-CM

## 2024-04-29 NOTE — TELEPHONE ENCOUNTER
Last office visit:11/25/23   Future Appointments   Date Time Provider Department Center   6/15/2024  8:00 AM Carlton Sosa, DO [facility] [place]   Last filled:4/4/24 #36 with 0 RF   Last labs:11/25/23

## 2024-05-01 RX ORDER — TRAMADOL HYDROCHLORIDE 50 MG/1
50 TABLET ORAL 2 TIMES DAILY PRN
Qty: 36 TABLET | Refills: 0 | Status: SHIPPED | OUTPATIENT
Start: 2024-05-01 | End: 2024-05-31

## 2024-05-09 ENCOUNTER — TELEPHONE (OUTPATIENT)
Dept: FAMILY MEDICINE CLINIC | Facility: CLINIC | Age: 64
End: 2024-05-09

## 2024-05-09 NOTE — TELEPHONE ENCOUNTER
NEED PRE OP APPT FOR SURGERY 6-7-24 WITH DR ANGLE GOINS, NEEDS PRE OP APPT 3 WEEKS BEFORE SURGERY, CALL WIFE BACK

## 2024-05-17 ENCOUNTER — LABORATORY ENCOUNTER (OUTPATIENT)
Dept: LAB | Age: 64
End: 2024-05-17
Attending: FAMILY MEDICINE

## 2024-05-17 ENCOUNTER — OFFICE VISIT (OUTPATIENT)
Dept: FAMILY MEDICINE CLINIC | Facility: CLINIC | Age: 64
End: 2024-05-17

## 2024-05-17 VITALS
TEMPERATURE: 97 F | HEART RATE: 93 BPM | RESPIRATION RATE: 18 BRPM | OXYGEN SATURATION: 97 % | WEIGHT: 221.81 LBS | BODY MASS INDEX: 33.62 KG/M2 | HEIGHT: 68 IN | DIASTOLIC BLOOD PRESSURE: 84 MMHG | SYSTOLIC BLOOD PRESSURE: 122 MMHG

## 2024-05-17 DIAGNOSIS — I25.10 CORONARY ARTERY CALCIFICATION SEEN ON CAT SCAN: ICD-10-CM

## 2024-05-17 DIAGNOSIS — I48.91 ATRIAL FIBRILLATION AND FLUTTER (HCC): ICD-10-CM

## 2024-05-17 DIAGNOSIS — M67.911 TENDINOPATHY OF RIGHT ROTATOR CUFF: ICD-10-CM

## 2024-05-17 DIAGNOSIS — M19.011 ARTHROSIS OF RIGHT ACROMIOCLAVICULAR JOINT: ICD-10-CM

## 2024-05-17 DIAGNOSIS — Z01.818 PRE-OP EVALUATION: ICD-10-CM

## 2024-05-17 DIAGNOSIS — F17.200 TOBACCO DEPENDENCE: ICD-10-CM

## 2024-05-17 DIAGNOSIS — I48.92 ATRIAL FIBRILLATION AND FLUTTER (HCC): ICD-10-CM

## 2024-05-17 DIAGNOSIS — E78.00 HYPERCHOLESTEREMIA: ICD-10-CM

## 2024-05-17 DIAGNOSIS — Z79.01 CURRENT USE OF LONG TERM ANTICOAGULATION: ICD-10-CM

## 2024-05-17 DIAGNOSIS — I10 ESSENTIAL HYPERTENSION: ICD-10-CM

## 2024-05-17 DIAGNOSIS — Z80.42 FAMILY HISTORY OF PROSTATE CANCER: ICD-10-CM

## 2024-05-17 DIAGNOSIS — Z01.818 PRE-OP EVALUATION: Primary | ICD-10-CM

## 2024-05-17 DIAGNOSIS — Z28.21 VACCINATION NOT CARRIED OUT BECAUSE OF PATIENT REFUSAL: ICD-10-CM

## 2024-05-17 DIAGNOSIS — Z12.5 SCREENING FOR PROSTATE CANCER: ICD-10-CM

## 2024-05-17 DIAGNOSIS — K21.9 GASTROESOPHAGEAL REFLUX DISEASE WITHOUT ESOPHAGITIS: ICD-10-CM

## 2024-05-17 DIAGNOSIS — E11.9 TYPE 2 DIABETES MELLITUS WITHOUT COMPLICATION, WITHOUT LONG-TERM CURRENT USE OF INSULIN (HCC): ICD-10-CM

## 2024-05-17 DIAGNOSIS — G25.0 ESSENTIAL TREMOR: ICD-10-CM

## 2024-05-17 LAB
ALBUMIN SERPL-MCNC: 3.9 G/DL (ref 3.4–5)
ALBUMIN/GLOB SERPL: 1.3 {RATIO} (ref 1–2)
ALP LIVER SERPL-CCNC: 87 U/L
ALT SERPL-CCNC: 48 U/L
ANION GAP SERPL CALC-SCNC: 4 MMOL/L (ref 0–18)
AST SERPL-CCNC: 25 U/L (ref 15–37)
ATRIAL RATE: 394 BPM
BILIRUB SERPL-MCNC: 0.5 MG/DL (ref 0.1–2)
BUN BLD-MCNC: 25 MG/DL (ref 9–23)
CALCIUM BLD-MCNC: 8.8 MG/DL (ref 8.5–10.1)
CHLORIDE SERPL-SCNC: 117 MMOL/L (ref 98–112)
CO2 SERPL-SCNC: 23 MMOL/L (ref 21–32)
COMPLEXED PSA SERPL-MCNC: 1.42 NG/ML (ref ?–4)
CREAT BLD-MCNC: 1.02 MG/DL
CREAT UR-SCNC: 116 MG/DL
EGFRCR SERPLBLD CKD-EPI 2021: 83 ML/MIN/1.73M2 (ref 60–?)
EST. AVERAGE GLUCOSE BLD GHB EST-MCNC: 134 MG/DL (ref 68–126)
FASTING STATUS PATIENT QL REPORTED: NO
GLOBULIN PLAS-MCNC: 2.9 G/DL (ref 2.8–4.4)
GLUCOSE BLD-MCNC: 95 MG/DL (ref 70–99)
HBA1C MFR BLD: 6.3 % (ref ?–5.7)
MICROALBUMIN UR-MCNC: 0.65 MG/DL
MICROALBUMIN/CREAT 24H UR-RTO: 5.6 UG/MG (ref ?–30)
OSMOLALITY SERPL CALC.SUM OF ELEC: 302 MOSM/KG (ref 275–295)
POTASSIUM SERPL-SCNC: 4.5 MMOL/L (ref 3.5–5.1)
PROT SERPL-MCNC: 6.8 G/DL (ref 6.4–8.2)
Q-T INTERVAL: 344 MS
QRS DURATION: 78 MS
QTC CALCULATION (BEZET): 411 MS
R AXIS: 51 DEGREES
SODIUM SERPL-SCNC: 144 MMOL/L (ref 136–145)
T AXIS: 18 DEGREES
VENTRICULAR RATE: 86 BPM

## 2024-05-17 PROCEDURE — 80053 COMPREHEN METABOLIC PANEL: CPT | Performed by: FAMILY MEDICINE

## 2024-05-17 PROCEDURE — 3008F BODY MASS INDEX DOCD: CPT | Performed by: FAMILY MEDICINE

## 2024-05-17 PROCEDURE — 3074F SYST BP LT 130 MM HG: CPT | Performed by: FAMILY MEDICINE

## 2024-05-17 PROCEDURE — 83036 HEMOGLOBIN GLYCOSYLATED A1C: CPT | Performed by: FAMILY MEDICINE

## 2024-05-17 PROCEDURE — 82043 UR ALBUMIN QUANTITATIVE: CPT | Performed by: FAMILY MEDICINE

## 2024-05-17 PROCEDURE — 93000 ELECTROCARDIOGRAM COMPLETE: CPT | Performed by: FAMILY MEDICINE

## 2024-05-17 PROCEDURE — 82570 ASSAY OF URINE CREATININE: CPT | Performed by: FAMILY MEDICINE

## 2024-05-17 PROCEDURE — 3079F DIAST BP 80-89 MM HG: CPT | Performed by: FAMILY MEDICINE

## 2024-05-17 PROCEDURE — 99244 OFF/OP CNSLTJ NEW/EST MOD 40: CPT | Performed by: FAMILY MEDICINE

## 2024-05-17 PROCEDURE — 84153 ASSAY OF PSA TOTAL: CPT | Performed by: FAMILY MEDICINE

## 2024-05-17 NOTE — H&P
Gabriel Nunezbrian Doss. is a 64 year old male   Chief Complaint   Patient presents with    Pre-Op Exam   Patient's wife states that she was told that if he is in atrial fibrillation, they will cancel the surgery  Here w/ wife  HPI:   Pt presents for preoperative consultation as requested by Dr Balaji Harris.  Patient is scheduled for right shoulder arthroscopic acromioplasty, distal clavicle resection, possible rotator cuff repair on 6/7/2024 at [facility] under general anesthesia.  Patient has had ongoing right shoulder pain.  He has failed conservative therapy.  Subacromial injection relieved the pain  MRI 5/11/2024 showed  IMPRESSION:   1.  Low-grade RTC tendinopathy with very small/small insertional tears limited to the footprint. No   sizable full-thickness tear.   2.  Very small subacromial-subdeltoid fluid collection.   3.  Mild to moderate AC joint degenerative changes with small effusion.   4.  Small joint effusion.   5.  Abnormal superior glenoid labrum, likely involving both sides of the biceps anchor but   especially posterosuperiorly with small tears in chronic degenerative fraying..   Wt Readings from Last 6 Encounters:   05/17/24 221 lb 12.8 oz (100.6 kg)   12/20/23 215 lb (97.5 kg)   11/25/23 215 lb 12.8 oz (97.9 kg)   08/11/23 212 lb (96.2 kg)   05/24/23 208 lb (94.3 kg)   05/13/23 208 lb 6.4 oz (94.5 kg)     Body mass index is 33.72 kg/m².     Cholesterol, Total (mg/dL)   Date Value   11/25/2023 110   11/19/2022 108   05/14/2022 103     CHOLESTEROL, TOTAL (mg/dL)   Date Value   09/04/2015 157   03/03/2015 134   12/23/2014 221 (H)     HDL Cholesterol (mg/dL)   Date Value   11/25/2023 31 (L)   11/19/2022 29 (L)   05/14/2022 25 (L)     HDL CHOLESTEROL (mg/dL)   Date Value   09/04/2015 24 (L)   03/03/2015 22 (L)   12/23/2014 21 (L)     LDL Cholesterol (mg/dL)   Date Value   11/25/2023 66   11/19/2022 65   05/14/2022 58     LDL-CHOLESTEROL (mg/dL (calc))   Date Value   09/04/2015 115    03/03/2015 83   12/23/2014      Comment:        LDL cholesterol not calculated. Triglyceride levels  greater than 400 mg/dL invalidate calculated LDL results.        Desirable range <100 mg/dL for patients with CHD or  diabetes and <70 mg/dL for diabetic patients with  known heart disease.          AST (U/L)   Date Value   11/25/2023 19   11/19/2022 23   05/14/2022 19   01/23/2016 28   12/16/2015 25   09/04/2015 27     ALT (U/L)   Date Value   11/25/2023 43   11/19/2022 41   05/14/2022 39   01/23/2016 37   12/16/2015 39   09/04/2015 28      Current Outpatient Medications   Medication Sig Dispense Refill    Multiple Vitamins-Minerals (EMERGEN-C IMMUNE OR) Take by mouth.      traMADol 50 MG Oral Tab Take 1 tablet (50 mg total) by mouth 2 (two) times daily as needed for Pain. 36 tablet 0    diclofenac 50 MG Oral Tab EC Take 1 tablet (50 mg total) by mouth 2 (two) times daily. 180 tablet 1    rosuvastatin 10 MG Oral Tab Take 1 tablet (10 mg total) by mouth every evening. 90 tablet 1    dilTIAZem  MG Oral Capsule SR 24 Hr Take 1 capsule (240 mg total) by mouth daily. 90 capsule 3    METFORMIN 500 MG Oral Tab TAKE 1 TABLET BY MOUTH TWICE A DAY WITH FOOD 180 tablet 0    LANSOPRAZOLE 30 MG Oral Capsule Delayed Release TAKE 1 CAPSULE (30 MG TOTAL) BY MOUTH BEFORE BREAKFAST. 90 capsule 3    rivaroxaban (XARELTO) 20 MG Oral Tab Take 1 tablet (20 mg total) by mouth daily. 90 tablet 3    Solifenacin Succinate 5 MG Oral Tab Take 1 tablet (5 mg total) by mouth daily. 90 tablet 3    TOPIRAMATE 25 MG Oral Tab TAKE 2 TABLETS BY MOUTH TWICE A  tablet 0    Vitamin C 500 MG Oral Tab Take 1 tablet (500 mg total) by mouth in the morning and 1 tablet (500 mg total) before bedtime.       Allergies   Allergen Reactions    Bee      difficulty breathing , throat swells       Aleve [Naproxen] OTHER (SEE COMMENTS)     DEPRESSION    Atorvastatin OTHER (SEE COMMENTS)     Leg cramps/ frecuent urinating    Lisinopril Coughing         Past Medical History:    Atrial fibrillation (HCC)    Esophageal reflux    Essential hypertension    H/O Salmonella gastroenteritis    Hypercholesteremia    Lung nodule    LLL, stable on serial CT scans since 2012    Obesity    Osteoarthritis of right knee    Tobacco dependence    Type 2 diabetes mellitus (HCC)      Past Surgical History:   Procedure Laterality Date    Colonoscopy  2/4/13    Hernia surgery Left     inguinal with mesh    Hernia surgery      umbilical    Other surgical history Left     KNEE SURGERY FOR CARTILAGE TEAR    Other surgical history Right     knee X 2- Arthroscopy    Tonsillectomy        Family History   Problem Relation Age of Onset    Cancer Father 70        PROSTATE CANCER    Heart Disorder Father         TIA    Psychiatric Mother         DEMENTIA    Diabetes Mother     Heart Disorder Mother         CAD    Lipids Mother     Hypertension Mother     Lipids Brother     Diabetes Sister     Diabetes Brother     Hypertension Brother     No Known Problems Sister     No Known Problems Sister         Social History     Socioeconomic History    Marital status:    Tobacco Use    Smoking status: Every Day     Current packs/day: 0.50     Average packs/day: 0.5 packs/day for 40.0 years (20.0 ttl pk-yrs)     Types: Cigarettes     Passive exposure: Never    Smokeless tobacco: Never    Tobacco comments:     Pt has been cutting back, total 50 pk yr   Vaping Use    Vaping status: Never Used   Substance and Sexual Activity    Alcohol use: No    Drug use: No   Other Topics Concern    Caffeine Concern Yes     Comment: 1 cup a day & 1 soda a week    Exercise Yes     Comment: active at work, 3x week    Seat Belt Yes    Special Diet No    Stress Concern No    Weight Concern Yes      Specialists: Cardiology follow-up for 10 years, Dr Harris-ortho, Dr Rush-neurology  Occ: runs [employer] ,  : +. Children: daughter.   Exercise: walking, active on the job.  Diet: generally healthy, low  arpita     REVIEW OF SYSTEMS:   GENERAL: generally feels well, denies fatigue, denies excessive daytime drowsiness, wt up 7# since last visit  EYES:denies blurred vision or double vision, glasses/ contacts: +  ENT: denies nasal congestion and PND ,no ST, + snoring but no reported periods of apnea, denies hearing deficits  LUNGS: denies shortness of breath with exertion, no wheezing   CARDIOVASCULAR: denies chest pain on exertion, palpations, anginal equivalent symptoms, no claudication , no peripheral edema, not checking bp   GI: denies heartburn, diarrhea, or change in bowel habits,no constipation, 1 harder stool daily, patient takes fiber supplement sporadically  : denies dysuria, hesitancy, +nocturia x 1 ,denies decreased urine stream, incontinence, erectile dysfunction, decreased libido  MUSCULOSKELETAL: denies back pain. + Chronic R>L knee pain and stiffness in hands-Diclofenac helpful  Having pain right shoulder x 1 1/2 yrs, getting worse, patient taking 2 times the maximum dose of acetaminophen during the day, tramadol when he gets home or on the weekends,  significant cervical spondylosis , and severe thoracic stenosis @ T2-3  NEURO: denies headaches,  dizziness, numbness and weakness, +tremors-improved on Topiramate   PSYCHE: denies depression or anxiety, +disrupted sleep-chronic  HEMATOLOGIC: denies unexplained bruising or bleeding  ENDOCRINE: denies heat or cold intolerance , no unexplained wt loss or gain, not checking bs  EXAM:   /84 (BP Location: Left arm, Patient Position: Sitting, Cuff Size: large)   Pulse 93   Temp 97.4 °F (36.3 °C) (Temporal)   Resp 18   Ht 5' 8\" (1.727 m)   Wt 221 lb 12.8 oz (100.6 kg)   SpO2 97%   BMI 33.72 kg/m²   Body mass index is 33.72 kg/m².   GENERAL: well developed, well nourished,in no apparent distress  SKIN: no rashes,no suspicious lesions, apex of scalp with approximately 1 cm well demarcated raised rough lesion, scattered seborrheic keratoses on the  trunk of varying degrees of maturity  HEENT: turbinates: normal, TMs intact, class I airway, postsurgical changes, fair dentition, no oral lesions  NECK: supple,no adenopathy,no bruits, no thyromegaly, symmetrical range of motion, negative Spurling sign  LUNGS: Distant breath sounds throughout, no wheezes, rhonchi, rales  CARDIO: Heart rate and rhythm are irregularly irregular without murmur, no S3, S4, 1+ pedal pulses bilaterally, no peripheral edema  EXTREMITIES: no cyanosis, clubbing or edema, FROM of all joints tested except for right knee: Extension limited by approximately 5° compared to the left, flexion to 90°, hypertrophic changes  Right shoulder: Patient is unable to actively raise arm above waist level due to pain, exquisite tenderness along the medial border of the scapula,, tender over AC joint  BILATERAL FOOT EXAM OF PLANTAR ASPECT: WITHOUT CALLUS, ULCERS OR GROSS DEFORMITIES, SENSATION INTACT TO MONOFILAMENT TESTING, 1+ DP / PT PULSES, NAILS W/ DYSTROPHIC CHANGES  NEURO: A &O X 3,cranial nerves are intact,motor and sensory are grossly intact, DTRs +2/4 UE/LE bilaterally except reduced right brachial radialis reflex, mild fine tremor in the hands  PSYCH: affect: blunt, slightly anxious, speech: clear and coherent, Insight: fair  EKG: Atrial fibrillation, heart rate 86, QRS interval 0.  078, QRS axis 50 degrees, no ischemic findings  ASSESSMENT AND PLAN:   Gabriel Gamino Jr. is a 64 year old male   Encounter Diagnoses   Name Primary?    Pre-op evaluation Yes    Arthrosis of right acromioclavicular joint     Tendinopathy of right rotator cuff     Type 2 diabetes mellitus without complication, without long-term current use of insulin (HCC)     Essential hypertension     Hypercholesteremia     Atrial fibrillation and flutter (HCC)     Current use of long term anticoagulation     Tobacco dependence     Gastroesophageal reflux disease without esophagitis     Coronary artery calcification seen on CAT scan      Screening for prostate cancer     Family history of prostate cancer     Vaccination not carried out because of patient refusal     Essential tremor      Will check preoperative labs.  Patient referred to [place] [facility] [place] for cardiac clearance.  Appointment with Leslie Hutchins/Wilner facilitated  Discussed postoperative pain management, avoidance of constipation, anticipated limitation of motion and importance of physical therapy  Medical clearance for procedure deferred until after cardiology eval.  Patient advised he will need to discontinue his diclofenac 10 days prior to the procedure.  Discussed maximum dose ago of acetaminophen of 1000 mg 3 times daily  Patient will need to be off his Xarelto 2 days prior to procedure he should hold his metformin the morning of the procedure  Additional recommendations regarding medication restriction after eval  Copy this consultation will be forwarded to Dr. Harris for review    Carlton Sosa DO, FAAFP      Orders Placed This Encounter   Procedures    Hemoglobin A1C [E]    Microalb/Creat Ratio, Random Urine [E]    Comp Metabolic Panel (14) [E]    PSA, Total (Screening) [E]     Meds & Refills for this Visit:  Requested Prescriptions      No prescriptions requested or ordered in this encounter     Imaging & Consults:  ELECTROCARDIOGRAM, COMPLETE  [facility] CARDIOLOGY EXTERNAL  No follow-ups on file.  There are no Patient Instructions on file for this visit.

## 2024-05-18 DIAGNOSIS — Z12.5 SCREENING FOR PROSTATE CANCER: ICD-10-CM

## 2024-05-18 DIAGNOSIS — E11.9 TYPE 2 DIABETES MELLITUS WITHOUT COMPLICATION, WITHOUT LONG-TERM CURRENT USE OF INSULIN (HCC): Primary | ICD-10-CM

## 2024-05-18 DIAGNOSIS — I10 ESSENTIAL HYPERTENSION: ICD-10-CM

## 2024-05-29 NOTE — TELEPHONE ENCOUNTER
Last office visit:11/25/23  Future Appointments   Date Time Provider Department Center   6/1/2024  2:00 PM [facility] CARD [facility] 1 [facility]   6/15/2024  8:00 AM Carlton Sosa, DO [facility] [place]   Last filled:4/3/24#180 w/ 0 RF  Last labs: 5/17/24     Protocol:      Diabetes Medication Protocol [id number]/29/2024 11:53 AM   Protocol Details Last A1C < 7.5 and within past 6 months    In person appointment or virtual visit in the past 6 mos or appointment in next 3 mos    Microalbumin procedure in past 12 months or taking ACE/ARB    EGFRCR or GFRNAA > 50    GFR in the past 12 months

## 2024-05-30 DIAGNOSIS — M17.31 POST-TRAUMATIC OSTEOARTHRITIS OF RIGHT KNEE: ICD-10-CM

## 2024-05-30 NOTE — TELEPHONE ENCOUNTER
Diabetes Medication Protocol [id number]/29/2024 11:53 AM   Protocol Details Last A1C < 7.5 and within past 6 months    In person appointment or virtual visit in the past 6 mos or appointment in next 3 mos    Microalbumin procedure in past 12 months or taking ACE/ARB    EGFRCR or GFRNAA > 50    GFR in the past 12 months       Last office visit:  05/17/24    Last refill:  04/03/24  #180, no refills  Last cmp:  05/17/24  Last A1c:  05/17/24  Last micro:  05/17/24    Future Appointments   Date Time Provider Department Center   6/1/2024  2:00 PM [facility] STR[facility] RM 1 [facility]   6/15/2024  8:00 AM Carlton Sosa, DO [facility] [place]

## 2024-05-31 ENCOUNTER — TELEPHONE (OUTPATIENT)
Dept: FAMILY MEDICINE CLINIC | Facility: CLINIC | Age: 64
End: 2024-05-31

## 2024-05-31 RX ORDER — TRAMADOL HYDROCHLORIDE 50 MG/1
50 TABLET ORAL 2 TIMES DAILY PRN
Qty: 36 TABLET | Refills: 0 | Status: SHIPPED | OUTPATIENT
Start: 2024-05-31 | End: 2024-06-30

## 2024-05-31 NOTE — TELEPHONE ENCOUNTER
NEEDS SIGNED PRE-OP SEND TO Balaji Harris MD  Surgery, Orthopedic  NPI: [id number]  [address]  [place] [address]     Phone: [phone]  Fax: [phone]

## 2024-05-31 NOTE — TELEPHONE ENCOUNTER
Spoke with pharmacist Everett who states he received a script from us today for patient's tramadol. He wanted Dr. Sosa to know that patient also has script for Hydrocodone 5-325 waiting for  the last 2 days from Dr. Harris. He wanted to make sure that Dr. Sosa was aware of this, and does he want him to take the tramadol if on the hydrocodone? Please advise.

## 2024-05-31 NOTE — TELEPHONE ENCOUNTER
I was not aware that he had a prescription for Norco.  Please cancel tramadol prescription while he is on Norco.  Please confirm Norco amount

## 2024-05-31 NOTE — TELEPHONE ENCOUNTER
Last refill: 05/01/24  qtY: 36 tabs  W/ 0 refills  Last ov 05/17/24    Requested Prescriptions     Pending Prescriptions Disp Refills    traMADol 50 MG Oral Tab 36 tablet 0     Sig: Take 1 tablet (50 mg total) by mouth 2 (two) times daily as needed for Pain.     Future Appointments   Date Time Provider Department Center   6/1/2024  2:00 PM [facility] CARD STRESS ECHO RM 1 [facility]   6/15/2024  8:00 AM Carlton Sosa, DO [facility] [place]

## 2024-05-31 NOTE — TELEPHONE ENCOUNTER
Everett from [facility] called.  They have norco ready for patient to  that was prescribed by a different dr.  He wanted to discuss.  Is dr was aware of that & should he be on both?.

## 2024-05-31 NOTE — TELEPHONE ENCOUNTER
Spoke with pharmacist and he verbalized understanding. He will reach out to patient. Patient has not picked up the script for Norco 5-325 yet. Advised to cancel tramadol if he is going to  the Norco. If he is going to cancel the Norco, then ok to prescribe the Tramadol. He verbalized understanding.

## 2024-06-01 ENCOUNTER — HOSPITAL ENCOUNTER (OUTPATIENT)
Dept: CV DIAGNOSTICS | Facility: HOSPITAL | Age: 64
Discharge: HOME | End: 2024-06-01
Attending: INTERNAL MEDICINE
Payer: COMMERCIAL

## 2024-06-01 DIAGNOSIS — E78.00 HYPERCHOLESTEREMIA: ICD-10-CM

## 2024-06-01 DIAGNOSIS — I48.91 ATRIAL FIBRILLATION (HCC): ICD-10-CM

## 2024-06-01 PROCEDURE — 93306 TTE W/DOPPLER COMPLETE: CPT | Performed by: INTERNAL MEDICINE

## 2024-06-13 ENCOUNTER — TELEPHONE (OUTPATIENT)
Dept: FAMILY MEDICINE CLINIC | Facility: CLINIC | Age: 64
End: 2024-06-13

## 2024-06-13 NOTE — TELEPHONE ENCOUNTER
JULY---spoke with pt's wife.  She states pt originally had an appt on 6/15 scheduled for his 6 month f/u on DM, but he had a pre-op px and all his labwork done on 5/17/24, so wife asks if he really needs to be seen now?  Pt saw his ortho yesterday for post-op appt and is doing well.  Advised no, he can just be seen in November for a 6 month follow-up. (Cancelled appt)    Pt scheduled alb appt for A1c on 11/8 and an OV on 11/12.

## 2024-06-13 NOTE — TELEPHONE ENCOUNTER
Patient has appointment on Saturday for hospital follow up.  She wanted to know if labs were needed,  If so, are they fasting.

## 2024-06-24 ENCOUNTER — HOSPITAL ENCOUNTER (OUTPATIENT)
Age: 64
Discharge: HOME | End: 2024-06-24

## 2024-06-24 ENCOUNTER — APPOINTMENT (OUTPATIENT)
Dept: GENERAL RADIOLOGY | Age: 64
End: 2024-06-24
Attending: NURSE PRACTITIONER

## 2024-06-24 ENCOUNTER — TELEPHONE (OUTPATIENT)
Dept: FAMILY MEDICINE CLINIC | Facility: CLINIC | Age: 64
End: 2024-06-24

## 2024-06-24 VITALS
WEIGHT: 220 LBS | RESPIRATION RATE: 16 BRPM | OXYGEN SATURATION: 95 % | TEMPERATURE: 98 F | HEART RATE: 105 BPM | DIASTOLIC BLOOD PRESSURE: 80 MMHG | BODY MASS INDEX: 33 KG/M2 | SYSTOLIC BLOOD PRESSURE: 112 MMHG

## 2024-06-24 DIAGNOSIS — J06.9 VIRAL URI: ICD-10-CM

## 2024-06-24 DIAGNOSIS — N39.0 URINARY TRACT INFECTION WITHOUT HEMATURIA, SITE UNSPECIFIED: Primary | ICD-10-CM

## 2024-06-24 LAB
GLUCOSE UR STRIP-MCNC: NEGATIVE MG/DL
HGB UR QL STRIP: NEGATIVE
KETONES UR STRIP-MCNC: NEGATIVE MG/DL
LEUKOCYTE ESTERASE UR QL STRIP: NEGATIVE
NITRITE UR QL STRIP: POSITIVE
PH UR STRIP: 6 [PH]
PROT UR STRIP-MCNC: NEGATIVE MG/DL
SP GR UR STRIP: 1.02
UROBILINOGEN UR STRIP-ACNC: 4 MG/DL

## 2024-06-24 PROCEDURE — 81002 URINALYSIS NONAUTO W/O SCOPE: CPT | Performed by: NURSE PRACTITIONER

## 2024-06-24 PROCEDURE — 87077 CULTURE AEROBIC IDENTIFY: CPT | Performed by: NURSE PRACTITIONER

## 2024-06-24 PROCEDURE — 87186 SC STD MICRODIL/AGAR DIL: CPT | Performed by: NURSE PRACTITIONER

## 2024-06-24 PROCEDURE — 71046 X-RAY EXAM CHEST 2 VIEWS: CPT | Performed by: NURSE PRACTITIONER

## 2024-06-24 PROCEDURE — A4627 SPACER BAG/RESERVOIR: HCPCS | Performed by: NURSE PRACTITIONER

## 2024-06-24 PROCEDURE — 87086 URINE CULTURE/COLONY COUNT: CPT | Performed by: NURSE PRACTITIONER

## 2024-06-24 PROCEDURE — 99214 OFFICE O/P EST MOD 30 MIN: CPT | Performed by: NURSE PRACTITIONER

## 2024-06-24 RX ORDER — CIPROFLOXACIN 500 MG/1
500 TABLET, FILM COATED ORAL 2 TIMES DAILY
Qty: 20 TABLET | Refills: 0 | Status: SHIPPED | OUTPATIENT
Start: 2024-06-24 | End: 2024-07-04

## 2024-06-24 RX ORDER — ALBUTEROL SULFATE 90 UG/1
2 AEROSOL, METERED RESPIRATORY (INHALATION) ONCE
Status: COMPLETED | OUTPATIENT
Start: 2024-06-24 | End: 2024-06-24

## 2024-06-24 NOTE — DISCHARGE INSTRUCTIONS
Rest and push fluids over the next few days.   Start the antibiotics and make sure to finish the entire prescription.   Take a probiotic while on this to help with GI side effects.   Use Allegra, Zyrtec, or Claritin up to twice a day to help with nasal congestion and post-nasal drainage.   Take the albuterol inhaler with the spacer 2-4 puffs every 4 hours as needed for cough or shortness of breath.   Go to the ER for any new or worsening of symptoms.   Follow up with your PCP in 3-5 days.

## 2024-06-24 NOTE — TELEPHONE ENCOUNTER
Pt has an appt scheduled with Camryn today.  Dr. Sosa reviewed note below and wants pt to be evaluated in the IC today.  PC to pt--spoke with wife. Advised IC in [place] for eval today.  Wife is agreeable and will take him now

## 2024-06-24 NOTE — ED INITIAL ASSESSMENT (HPI)
Pt with urinary freq, burning and abd pain bloating and gas for a couple of weeks. Pt with recent sx and ended up with constipation. Pt states chills/ and night sweats at night. Up every hour at night. Foul smelling and dark urine. Pt states pt with congestion started about 1 week ago with coughing up sputum and now pt with analisa.

## 2024-06-24 NOTE — ED PROVIDER NOTES
Patient Seen in: [facility]      History     Chief Complaint   Patient presents with    Urinary Symptoms     Stated Complaint: urination frequency, burning. Stomach pain. Surgery on 6/7 for shoulder.    Subjective:   65 yo male presents to the immediate care with c/o urinary symptoms.  Patient states for the last 4-5 days he has been having urinary urgency, frequency, and burning.  He has also been having chills at night, but does not think he has been having fever.  He also complains of some nasal congestion and a cough that has been going on for the last week.  He has not been taking anything for his symptoms.  He has a history of A-fib and is on Xarelto.  He has noticed that his urine has had a foul smell and has been a darker color than usual.  He has had some shortness of breath with exertion over the last 2 days as well.  He denies any fever, ear pain, ear drainage, sore throat, chest pain, nausea, vomiting, back pain, or flank pain.      The history is provided by the patient.       Objective:   No pertinent past medical history.            No pertinent past surgical history.              No pertinent social history.            Review of Systems   Constitutional:  Positive for chills, diaphoresis and fatigue. Negative for fever.   HENT:  Positive for congestion. Negative for ear discharge, ear pain, rhinorrhea, sore throat, trouble swallowing and voice change.    Respiratory:  Positive for cough and shortness of breath. Negative for chest tightness and wheezing.    Cardiovascular: Negative.  Negative for chest pain.   Gastrointestinal:  Positive for abdominal pain and constipation. Negative for diarrhea, nausea and vomiting.   Genitourinary:  Positive for dysuria, frequency and urgency. Negative for flank pain and hematuria.   Musculoskeletal: Negative.  Negative for back pain.   All other systems reviewed and are negative.      Positive for stated complaint: urination frequency, burning. Stomach  pain. Surgery on 6/7 for shoulder.  Other systems are as noted in HPI.  Constitutional and vital signs reviewed.      All other systems reviewed and negative except as noted above.    Physical Exam     ED Triage Vitals [06/24/24 1115]   /80   Pulse 105   Resp 16   Temp 97.5 °F (36.4 °C)   Temp src Temporal   SpO2 95 %   O2 Device None (Room air)       Current Vitals:   Vital Signs  BP: 112/80  Pulse: 105  Resp: 16  Temp: 97.5 °F (36.4 °C)  Temp src: Temporal    Oxygen Therapy  SpO2: 95 %  O2 Device: None (Room air)            Physical Exam  Vitals and nursing note reviewed.   Constitutional:       General: He is not in acute distress.     Appearance: Normal appearance. He is obese. He is not ill-appearing.   HENT:      Head: Normocephalic and atraumatic.      Right Ear: Tympanic membrane, ear canal and external ear normal.      Left Ear: Tympanic membrane, ear canal and external ear normal.      Nose: Congestion present.      Mouth/Throat:      Mouth: Mucous membranes are moist.      Pharynx: Oropharynx is clear. Posterior oropharyngeal erythema present.      Comments: Cobblestoning and postnasal drainage.  Eyes:      Conjunctiva/sclera: Conjunctivae normal.      Pupils: Pupils are equal, round, and reactive to light.   Cardiovascular:      Rate and Rhythm: Normal rate and regular rhythm.      Pulses: Normal pulses.      Heart sounds: Normal heart sounds. No murmur heard.  Pulmonary:      Effort: Pulmonary effort is normal. No respiratory distress.      Breath sounds: Normal breath sounds.   Abdominal:      General: Abdomen is protuberant. Bowel sounds are normal.      Palpations: Abdomen is soft.      Tenderness: There is abdominal tenderness in the suprapubic area and left lower quadrant. There is no right CVA tenderness or left CVA tenderness.   Musculoskeletal:         General: Normal range of motion.   Skin:     General: Skin is warm and dry.   Neurological:      General: No focal deficit present.       Mental Status: He is alert and oriented to person, place, and time.   Psychiatric:         Mood and Affect: Mood normal.         Behavior: Behavior normal.           ED Course     Labs Reviewed   [facility] POCT URINALYSIS DIPSTICK - Abnormal; Notable for the following components:       Result Value    Urine Color Orange (*)     Urine Clarity Cloudy (*)     Bilirubin, Urine Small (*)     Nitrite Urine Positive (*)     Urobilinogen urine 4.0 (*)     All other components within normal limits   URINE CULTURE, ROUTINE          ED Course as of 06/24/24 1209  ------------------------------------------------------------  Time: 06/24 1122  Value: POCT Urinalysis Dipstick(!)  Comment: Positive for nitrites, small bilirubin and urobilinogen.  Will send for culture.   ------------------------------------------------------------  Time: 06/24 1204  Value: XR CHEST PA + LAT CHEST (CPT=71046)  Comment: Chest x-ray as interpreted by myself shows no acute cardiopulmonary pathology.  ------------------------------------------------------------  Time: 06/24 1208  Value: XR CHEST PA + LAT CHEST (YOK=[id number])  Comment: FINDINGS:    Heart size is within normal limits.  Pleural spaces appear clear.  Mediastinal and hilar contours are normal.  No focal consolidation.  Probable calcified granuloma of the left lower lobe is present and similar to prior exams.  If clinical symptoms   persist then consider follow-up imaging.             MDM                      Medical Decision Making  64-year-old male with urinary symptoms and cough.  Chest x-ray and UA were ordered.    UA is positive for nitrates and bilirubin.  It was negative for leukocytes however given patient's symptoms we will send for culture and treat with p.o. antibiotics.  No evidence of sepsis or pyelonephritis.  Patient has a good understanding that if anything changes or worsens to go to the ER for further evaluation.  Albuterol inhaler was given to patient with a spacer here in the  department.  Instruction and return demo completed by RN.  Feel the patient likely has a viral URI at this time.  Encourage patient to use over-the-counter antihistamines to help with symptoms and use the albuterol inhaler for cough.  No evidence of sepsis, otitis media, otitis externa, bacterial sinusitis, strep, or other upper respiratory bacterial infection.  Patient to follow-up with his PCP in a few days.    Amount and/or Complexity of Data Reviewed  Labs: ordered. Decision-making details documented in ED Course.  Radiology: ordered and independent interpretation performed. Decision-making details documented in ED Course.    Risk  OTC drugs.  Prescription drug management.        Disposition and Plan     Clinical Impression:  1. Urinary tract infection without hematuria, site unspecified    2. Viral URI         Disposition:  Discharge  6/24/2024 12:09 pm    Follow-up:  Carlton Sosa, DO  [address] [facility] [address]  [phone]    In 3 days  As needed          Medications Prescribed:  Current Discharge Medication List        START taking these medications    Details   ciprofloxacin 500 MG Oral Tab Take 1 tablet (500 mg total) by mouth 2 (two) times daily for 10 days.  Qty: 20 tablet, Refills: 0

## 2024-06-24 NOTE — TELEPHONE ENCOUNTER
Wife is calling stating patient started symptoms Saturday June 15th  pt states he was really constipated and was taking norco for post op shoulder surgery that  was done June 7th , Patient states he stopped taking norco and felt weak sweat, chills fever on a off and low energy   Patient state he as had painful urination for 3 to 4 days with a strong smell and brown color and  LLQ pain patient complaining of loose stool different colors such as cream and gas  patient states he has no appetite and is coughing up phlegm          Patient didn't call the surgeon patient states he felt like a primary care doctor  concern         Please advise good call back number is [phone]

## 2024-06-27 ENCOUNTER — OFFICE VISIT (OUTPATIENT)
Dept: FAMILY MEDICINE CLINIC | Facility: CLINIC | Age: 64
End: 2024-06-27

## 2024-06-27 ENCOUNTER — TELEPHONE (OUTPATIENT)
Dept: FAMILY MEDICINE CLINIC | Facility: CLINIC | Age: 64
End: 2024-06-27

## 2024-06-27 VITALS
TEMPERATURE: 98 F | OXYGEN SATURATION: 97 % | RESPIRATION RATE: 20 BRPM | HEART RATE: 118 BPM | DIASTOLIC BLOOD PRESSURE: 70 MMHG | WEIGHT: 212.25 LBS | SYSTOLIC BLOOD PRESSURE: 104 MMHG | BODY MASS INDEX: 32 KG/M2

## 2024-06-27 DIAGNOSIS — I10 ESSENTIAL HYPERTENSION: ICD-10-CM

## 2024-06-27 DIAGNOSIS — I48.91 ATRIAL FIBRILLATION AND FLUTTER (HCC): ICD-10-CM

## 2024-06-27 DIAGNOSIS — N30.00 ACUTE CYSTITIS WITHOUT HEMATURIA: Primary | ICD-10-CM

## 2024-06-27 DIAGNOSIS — I48.92 ATRIAL FIBRILLATION AND FLUTTER (HCC): ICD-10-CM

## 2024-06-27 DIAGNOSIS — Z98.890 S/P SHOULDER SURGERY: ICD-10-CM

## 2024-06-27 DIAGNOSIS — E11.9 TYPE 2 DIABETES MELLITUS WITHOUT COMPLICATION, WITHOUT LONG-TERM CURRENT USE OF INSULIN (HCC): ICD-10-CM

## 2024-06-27 PROCEDURE — 3074F SYST BP LT 130 MM HG: CPT | Performed by: FAMILY MEDICINE

## 2024-06-27 PROCEDURE — 3061F NEG MICROALBUMINURIA REV: CPT | Performed by: FAMILY MEDICINE

## 2024-06-27 PROCEDURE — 99214 OFFICE O/P EST MOD 30 MIN: CPT | Performed by: FAMILY MEDICINE

## 2024-06-27 PROCEDURE — 3078F DIAST BP <80 MM HG: CPT | Performed by: FAMILY MEDICINE

## 2024-06-27 PROCEDURE — 3044F HG A1C LEVEL LT 7.0%: CPT | Performed by: FAMILY MEDICINE

## 2024-06-27 RX ORDER — HYDROCODONE BITARTRATE AND ACETAMINOPHEN 5; 325 MG/1; MG/1
1 TABLET ORAL EVERY 6 HOURS PRN
COMMUNITY
Start: 2024-06-12

## 2024-06-27 RX ORDER — LANCETS 30 GAUGE
EACH MISCELLANEOUS
Qty: 100 EACH | Refills: 3 | Status: SHIPPED | OUTPATIENT
Start: 2024-06-27

## 2024-06-27 RX ORDER — GLUCOSAMINE HCL/CHONDROITIN SU 500-400 MG
CAPSULE ORAL
Qty: 100 STRIP | Refills: 3 | Status: SHIPPED | OUTPATIENT
Start: 2024-06-27

## 2024-06-27 NOTE — PROGRESS NOTES
Gabriel Gamino Jr. is a 64 year old male.  Chief Complaint   Patient presents with    Urgent Care F/u     Fup from [facility]   Here w/ wife  HPI:   Patient was seen at the urgent care on 6/24 with complaints of dark urine, abdominal pain, urinary frequency and dysuria, chills.  Urine culture confirmed E. coli UTI and patient started on Cipro  Urination back to normal,  not checking BS- needs a new glucometer  Shoulder surgery on 6/7/2024, stopped norco, +constipated, used a Fleets 2 weeks ago, taking tylenol . Normal BM today    Current Outpatient Medications   Medication Sig Dispense Refill    HYDROcodone-acetaminophen 5-325 MG Oral Tab Take 1 tablet by mouth every 6 (six) hours as needed.      ciprofloxacin 500 MG Oral Tab Take 1 tablet (500 mg total) by mouth 2 (two) times daily for 10 days. 20 tablet 0    metFORMIN 500 MG Oral Tab TAKE 1 TABLET BY MOUTH TWICE A DAY WITH FOOD (Patient taking differently: Take 1 tablet (500 mg total) by mouth daily with breakfast.) 180 tablet 1    Multiple Vitamins-Minerals (EMERGEN-C IMMUNE OR) Take by mouth.      diclofenac 50 MG Oral Tab EC Take 1 tablet (50 mg total) by mouth 2 (two) times daily. 180 tablet 1    rosuvastatin 10 MG Oral Tab Take 1 tablet (10 mg total) by mouth every evening. 90 tablet 1    dilTIAZem  MG Oral Capsule SR 24 Hr Take 1 capsule (240 mg total) by mouth daily. 90 capsule 3    LANSOPRAZOLE 30 MG Oral Capsule Delayed Release TAKE 1 CAPSULE (30 MG TOTAL) BY MOUTH BEFORE BREAKFAST. 90 capsule 3    rivaroxaban (XARELTO) 20 MG Oral Tab Take 1 tablet (20 mg total) by mouth daily. 90 tablet 3    Solifenacin Succinate 5 MG Oral Tab Take 1 tablet (5 mg total) by mouth daily. 90 tablet 3    TOPIRAMATE 25 MG Oral Tab TAKE 2 TABLETS BY MOUTH TWICE A  tablet 0    Vitamin C 500 MG Oral Tab Take 1 tablet (500 mg total) by mouth in the morning and 1 tablet (500 mg total) before bedtime.        Past Medical History:    Atrial fibrillation (HCC)     Esophageal reflux    Essential hypertension    H/O Salmonella gastroenteritis    Hypercholesteremia    Lung nodule    LLL, stable on serial CT scans since 2012    Obesity    Osteoarthritis of right knee    Tobacco dependence    Type 2 diabetes mellitus (HCC)      Social History:  Social History     Socioeconomic History    Marital status:    Tobacco Use    Smoking status: Every Day     Current packs/day: 0.50     Average packs/day: 0.5 packs/day for 40.0 years (20.0 ttl pk-yrs)     Types: Cigarettes     Passive exposure: Never    Smokeless tobacco: Never    Tobacco comments:     Pt has been cutting back, total 50 pk yr   Vaping Use    Vaping status: Never Used   Substance and Sexual Activity    Alcohol use: No    Drug use: No   Other Topics Concern    Caffeine Concern Yes     Comment: 1 cup a day & 1 soda a week    Exercise Yes     Comment: active at work, 3x week    Seat Belt Yes    Special Diet No    Stress Concern No    Weight Concern Yes        REVIEW OF SYSTEMS:   GENERAL HEALTH: feels well otherwise, denies fatigue, appetite ok  SKIN: denies any unusual skin lesions or rashes  ENT: denies nasal congestion, pnd, sore throat, ear pain or pressure, decreased hearing  RESPIRATORY: denies shortness of breath with exertion,cough, wheezing  CARDIOVASCULAR: denies chest pain on exertion, edema  GI: denies abdominal pain and denies heartburn  NEURO: denies headaches  PSYCH: denies feeling stressed or anxious    EXAM:   /70   Pulse 118   Temp 97.9 °F (36.6 °C) (Tympanic)   Resp 20   Wt 212 lb 4 oz (96.3 kg)   SpO2 97%   BMI 32.27 kg/m²   GENERAL: well developed, well nourished,in no apparent distress, well hydrated  SKIN: no rashes,no suspicious lesions  ENT: TMs: intact, good mobility, Nose: turbinates clear, no dc, Throat: no erythema, pnd, or lesions  NECK: supple,no adenopathy,no bruits, no thyromegaly  LUNGS: clear to auscultation, no w/r/r  CARDIO: Heart rate and rhythm irregularly irregular  without murmur, peripheral pulses intact, no edema  GI: good BS's,no masses, HSM or tenderness, no rebound, rigidity, guarding  Right shoulder: Restricted flexion, well-healed arthroscopy incisions    ASSESSMENT AND PLAN:     Encounter Diagnoses   Name Primary?    Acute cystitis without hematuria Yes    S/P shoulder surgery     Type 2 diabetes mellitus without complication, without long-term current use of insulin (HCC)     Essential hypertension     Atrial fibrillation and flutter (HCC)      No orders of the defined types were placed in this encounter.    Meds & Refills for this Visit:  Requested Prescriptions      No prescriptions requested or ordered in this encounter     Imaging & Consults:  None  No follow-ups on file.  Patient Instructions   I reviewed urgent care records including imaging and microbiology.  Encourage patient to finish his antibiotics and push fluids.  Discussed management of constipation.  Would hold off for Norco unless there is severe pain.  Patient may benefit from topical lidocaine patches as well as acetaminophen.  Patient was encouraged to monitor his blood sugar especially in the setting of acute infection.  Order new glucometer and test strips for him  Reviewed signs and symptoms of lower urinary tract outlet obstruction.  Continue physical therapy, activity as tolerated   The patient indicates understanding of these issues and agrees to the plan.    Carlton Sosa DO, FAAFP

## 2024-06-27 NOTE — PATIENT INSTRUCTIONS
I reviewed urgent care records including imaging and microbiology.  Encourage patient to finish his antibiotics and push fluids.  Discussed management of constipation.  Would hold off for Norco unless there is severe pain.  Patient may benefit from topical lidocaine patches as well as acetaminophen.  Patient was encouraged to monitor his blood sugar especially in the setting of acute infection.  Order new glucometer and test strips for him  Reviewed signs and symptoms of lower urinary tract outlet obstruction.  Continue physical therapy, activity as tolerated

## 2024-07-19 ENCOUNTER — TELEPHONE (OUTPATIENT)
Dept: NEUROLOGY | Facility: CLINIC | Age: 64
End: 2024-07-19

## 2024-07-19 DIAGNOSIS — G25.0 ESSENTIAL TREMOR: Primary | ICD-10-CM

## 2024-07-19 RX ORDER — GABAPENTIN 100 MG/1
100 CAPSULE ORAL 2 TIMES DAILY
Qty: 60 CAPSULE | Refills: 1 | Status: SHIPPED | OUTPATIENT
Start: 2024-07-19

## 2024-07-19 NOTE — TELEPHONE ENCOUNTER
Patient calling, advised that his tremors are getting worse. Per Dr. Rush's recommendation he did take 3 pills of topiramate in the morning and 3 at night for a few months, but reduced to 2 pills morning/night because he did not notice a difference.     Pt had surgery on 6/7 and noticed that his tremors are significantly worse. Increased dose to 3 pills morning/night for the past few weeks but has also not seen a difference in tremors.     Please advise symptoms and possible medication adjustment

## 2024-07-19 NOTE — TELEPHONE ENCOUNTER
GABAPENTIN 100 mg BID ordered per Dr Rush;    add gabapentin 100 mg BID and schedule follow up with me or Faviola      Informed patient with wife on speaker phone.  Advised to take medication and call with condition update after three weeks.  Verbalized understanding.   Routed to  to schedule follow up visit.

## 2024-08-08 ENCOUNTER — TELEPHONE (OUTPATIENT)
Dept: FAMILY MEDICINE CLINIC | Facility: CLINIC | Age: 64
End: 2024-08-08

## 2024-08-08 NOTE — TELEPHONE ENCOUNTER
Spoke with pt's wife. States she dropped off paperwork for clearance for DOT exam.  This was sent to the forms dept.  Wife asks when it will be ready?  Ph# to [employer] Forms Dept given---wife will call to discuss

## 2024-08-08 NOTE — TELEPHONE ENCOUNTER
She is calling about the DOT forms that Mango dropped off. 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 Per the forms department Dr. Sosa needs to fill this form out.

## 2024-08-08 NOTE — TELEPHONE ENCOUNTER
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

## 2024-08-08 NOTE — TELEPHONE ENCOUNTER
Form received is not completed by the Forms dept.  It is Diabetes Mellitus Assessment form from [place] [employer].  Contacted Clinical Supervisor of office and informed her.  Informed patient's wife and she expressed understanding.

## 2024-08-13 RX ORDER — TOPIRAMATE 25 MG/1
75 TABLET ORAL 2 TIMES DAILY
Qty: 540 TABLET | Refills: 3 | Status: SHIPPED | OUTPATIENT
Start: 2024-08-13

## 2024-08-13 NOTE — TELEPHONE ENCOUNTER
Patient's spouse Jasmin called to have Topiramate refilled. She states that Dr. Rush had modified the original script allowing him to take 3 in the AM and 3 in the PM. She is requesting the refill to be modified to this new regimen. Please call Jasmin [phone] to confirm this or with questions. Send refill to [id number] in [place].

## 2024-08-13 NOTE — TELEPHONE ENCOUNTER
Per OV note 12/20/23..    Topiramate 25mg  Sig #3 tablets PO TID    Pending updated Rx to requested pharmacy

## 2024-08-17 RX ORDER — ROSUVASTATIN CALCIUM 10 MG/1
10 TABLET, COATED ORAL EVERY EVENING
Qty: 90 TABLET | Refills: 1 | Status: SHIPPED | OUTPATIENT
Start: 2024-08-17

## 2024-08-17 RX ORDER — SOLIFENACIN SUCCINATE 5 MG/1
5 TABLET, FILM COATED ORAL DAILY
Qty: 90 TABLET | Refills: 3 | Status: SHIPPED | OUTPATIENT
Start: 2024-08-17

## 2024-08-17 NOTE — TELEPHONE ENCOUNTER
Requested Prescriptions     Signed Prescriptions Disp Refills    ROSUVASTATIN 10 MG Oral Tab 90 tablet 1     Sig: TAKE 1 TABLET BY MOUTH EVERY DAY IN THE EVENING     Authorizing Provider: VIDAL ALEMAN     Ordering User: INOCENTE LOPEZ    DICLOFENAC 50 MG Oral Tab  tablet 1     Sig: TAKE 1 TABLET BY MOUTH TWICE A DAY     Authorizing Provider: VIDAL ALEMAN     Ordering User: INOCENTE LOPEZ    SOLIFENACIN SUCCINATE 5 MG Oral Tab 90 tablet 3     Sig: TAKE 1 TABLET BY MOUTH ONCE DAILY.     Authorizing Provider: VIDAL ALEMAN     Ordering User: INOCENTE LOPEZ        Refilled per protocol/OV notes

## 2024-08-25 ENCOUNTER — PATIENT MESSAGE (OUTPATIENT)
Dept: FAMILY MEDICINE CLINIC | Facility: CLINIC | Age: 64
End: 2024-08-25

## 2024-08-25 DIAGNOSIS — M17.31 POST-TRAUMATIC OSTEOARTHRITIS OF RIGHT KNEE: Primary | ICD-10-CM

## 2024-08-26 ENCOUNTER — TELEPHONE (OUTPATIENT)
Dept: FAMILY MEDICINE CLINIC | Facility: CLINIC | Age: 64
End: 2024-08-26

## 2024-08-26 RX ORDER — TRAMADOL HYDROCHLORIDE 50 MG/1
50 TABLET ORAL 2 TIMES DAILY PRN
Qty: 36 TABLET | Refills: 0 | Status: SHIPPED | OUTPATIENT
Start: 2024-08-26

## 2024-08-26 NOTE — TELEPHONE ENCOUNTER
From: Gabriel Gamino Jr.  To: Carlton Sosa  Sent: 8/25/2024 4:51 PM CDT  Subject: Tramadol    Dr oSsa   When I went to my two month shoulder surgery follow-up appointment with Dr Harris, we discussed my recovery, pain level and medication. I let him know I'm still in pain and would like more tramadol and more physical therapy. He ordered up the therapy yet said to take tylenol. He didn't seem to favor more tramadol. But tylenol doesn't help my pain and I told him it affects my heartrate for some reason. He looked at me like I was crazy. I went back to work to get my time in for long-term so that I could get back into therapy. I have one more week of work and could use tramadol for pain plus getting through therapy when that starts up again. I hope you understand and can help me by prescribing it.   Thank you  Gabriel

## 2024-08-27 NOTE — TELEPHONE ENCOUNTER
Spoke with Melissa @ [facility] [place].  She states Tramadol script needs a P.A.---insurance will not cover more than 2 scripts in 60 days. Pt rec'd Tramadol scripts from his orthopedic #30 on 7/11/24 and #30 on 7/25.    PC to [facility] @ [phone].  Prior auth done and APPROVED x6 MONTHS.  Auth ID#  [id number].    Wife advised

## 2024-09-05 ENCOUNTER — OFFICE VISIT (OUTPATIENT)
Dept: NEUROLOGY | Facility: CLINIC | Age: 64
End: 2024-09-05
Payer: COMMERCIAL

## 2024-09-05 VITALS
OXYGEN SATURATION: 96 % | HEART RATE: 67 BPM | DIASTOLIC BLOOD PRESSURE: 82 MMHG | WEIGHT: 217 LBS | BODY MASS INDEX: 33 KG/M2 | SYSTOLIC BLOOD PRESSURE: 124 MMHG

## 2024-09-05 DIAGNOSIS — G25.0 ESSENTIAL TREMOR: Primary | ICD-10-CM

## 2024-09-05 PROCEDURE — 3074F SYST BP LT 130 MM HG: CPT | Performed by: PHYSICIAN ASSISTANT

## 2024-09-05 PROCEDURE — 99213 OFFICE O/P EST LOW 20 MIN: CPT | Performed by: PHYSICIAN ASSISTANT

## 2024-09-05 PROCEDURE — 3079F DIAST BP 80-89 MM HG: CPT | Performed by: PHYSICIAN ASSISTANT

## 2024-09-05 NOTE — PATIENT INSTRUCTIONS

## 2024-09-05 NOTE — PROGRESS NOTES
NEUROLOGY  [facility]         Previous visit and existing record notes reviewed in preparation for the face to face visit.  Relevant labs and studies reviewed and will be noted in relevant areas of this record.    Gabriel Gamino Jr.  1/16/1960  Primary Care Provider:  Carlton Sosa DO    9/5/2024  64 year old male,      was last seen on: Patient was last seen 12/20/23 and at that time he had complaints of increased tremors and so the Topamax was increased to 75mg bid.    Seen for/plans last visit:  Essential Tremor    Accompanied visit: Yes- Wife    Present condition:    Patient is a 64 year old male with past medical hx significant for htn,hyperlipidemia, type II diabetes and atrial fibrillation. Here to follow-up for essential tremors.  Notices the tremor when holding things. Gives example of trouble doing fine tasks with hands. Has trouble holding a cup of coffee. He asked his wife to get him wide silverware for eating  Has toruble with hand writing  Has tremors in bilateral hands R>L  He went to therapy for his shoulder and felt like pain made the tremors worse  Used tramadol for the pain which was prescribed by ortho and then pcp  Tylenol is not able to tolerate  Called the office over the summer due to increased tremors and was started on gabapentin which has seemed to help the tremors  No side effects to the gabapentin  He is currently on the topamax 75mg bid for the tremors and tolerating it without side effects      Review of Systems:  Review of Systems:  Denies systemic symptoms     No CP or SOB.  No GI or  symptoms. Relevant Neuro as noted above.    Past Medical History:    Atrial fibrillation (HCC)    Esophageal reflux    Essential hypertension    H/O Salmonella gastroenteritis    Hypercholesteremia    Lung nodule    LLL, stable on serial CT scans since 2012    Obesity    Osteoarthritis of right knee    Tobacco dependence    Type 2 diabetes mellitus (HCC)        Medications:      Current Outpatient Medications:     traMADol 50 MG Oral Tab, Take 1 tablet (50 mg total) by mouth 2 (two) times daily as needed for Pain., Disp: 36 tablet, Rfl: 0    ROSUVASTATIN 10 MG Oral Tab, TAKE 1 TABLET BY MOUTH EVERY DAY IN THE EVENING, Disp: 90 tablet, Rfl: 1    DICLOFENAC 50 MG Oral Tab EC, TAKE 1 TABLET BY MOUTH TWICE A DAY, Disp: 180 tablet, Rfl: 1    SOLIFENACIN SUCCINATE 5 MG Oral Tab, TAKE 1 TABLET BY MOUTH ONCE DAILY., Disp: 90 tablet, Rfl: 3    topiramate 25 MG Oral Tab, Take 3 tablets (75 mg total) by mouth 2 (two) times daily., Disp: 540 tablet, Rfl: 3    gabapentin 100 MG Oral Cap, Take 1 capsule (100 mg total) by mouth in the morning and 1 capsule (100 mg total) before bedtime., Disp: 60 capsule, Rfl: 1    Glucose Blood (BLOOD GLUCOSE TEST) In Vitro Strip, As directed---use to check blood glucose once daily, Disp: 100 strip, Rfl: 3    Blood Glucose Monitoring Suppl Does not apply Device, As directed---use to check blood glucose once daily, Disp: 1 each, Rfl: 0    Lancets Does not apply Misc, As directed---use to check blood glucose once daily, Disp: 100 each, Rfl: 3    metFORMIN 500 MG Oral Tab, TAKE 1 TABLET BY MOUTH TWICE A DAY WITH FOOD (Patient taking differently: Take 1 tablet (500 mg total) by mouth daily with breakfast.), Disp: 180 tablet, Rfl: 1    Multiple Vitamins-Minerals (EMERGEN-C IMMUNE OR), Take by mouth., Disp: , Rfl:     dilTIAZem  MG Oral Capsule SR 24 Hr, Take 1 capsule (240 mg total) by mouth daily., Disp: 90 capsule, Rfl: 3    LANSOPRAZOLE 30 MG Oral Capsule Delayed Release, TAKE 1 CAPSULE (30 MG TOTAL) BY MOUTH BEFORE BREAKFAST., Disp: 90 capsule, Rfl: 3    rivaroxaban (XARELTO) 20 MG Oral Tab, Take 1 tablet (20 mg total) by mouth daily., Disp: 90 tablet, Rfl: 3    Vitamin C 500 MG Oral Tab, Take 1 tablet (500 mg total) by mouth in the morning and 1 tablet (500 mg total) before bedtime., Disp: , Rfl:     HYDROcodone-acetaminophen 5-325 MG Oral  Tab, Take 1 tablet by mouth every 6 (six) hours as needed. (Patient not taking: Reported on 9/5/2024), Disp: , Rfl:   PRN:     Allergies:  Allergies   Allergen Reactions    Bee      difficulty breathing , throat swells       Aleve [Naproxen] OTHER (SEE COMMENTS)     DEPRESSION    Atorvastatin OTHER (SEE COMMENTS)     Leg cramps/ frecuent urinating    Lisinopril Coughing          EXAM:  /82   Pulse 67   Wt 217 lb (98.4 kg)   SpO2 96%   BMI 32.99 kg/m²   Looks stated age  General Exam:  HENT:  pink conjunctiva anicteric sclerae  Neck no adenopathy, thyroid normal  Heart and Lungs:  normal  Extremities: no cyanosis, skin changes    NEURO  NAd, A&Ox3  EOMI  CN II-XII intact  Strength 5/5 all extremities  DTRs 2+ patellar bilaterally  FTN intact bilaterally  No drift on exam  Postural tremors R>L  No resting tremors seen  No cogwheel or lead pipe rigidity  Romberg negative  Archimedes showed tremors in bilateral hands  No shuffling or apractic gait      Problem/s Identified this visit:   1. Essential tremor          Discussion plus Diagnostics & Treatment Orders:    Essential Tremor- improved with the gabapentin 100mg bid. Discussed that there is room to adjust the gabapentin if needed. Continue the Topamax 75mg bid.   Patient also made aware of other treatment options for essential tremor including DBS, MR Guided Focused Ultrasound, and Nicole Trio Device. Patient was also given information on essential tremor.     (X) Discussed potential side effects of any treatment relevant to above.  Includes explanation of tests as necessary.    Return in about 6 months (around 3/5/2025).      Patient understands that if needed, based on condition and or test results, follow up will be readjusted    Faviola Charlton PA-C  [facility]  9/5/2024, Time completed 9:37 AM

## 2024-09-14 DIAGNOSIS — M17.31 POST-TRAUMATIC OSTEOARTHRITIS OF RIGHT KNEE: ICD-10-CM

## 2024-09-14 DIAGNOSIS — G25.0 ESSENTIAL TREMOR: ICD-10-CM

## 2024-09-16 RX ORDER — GABAPENTIN 100 MG/1
100 CAPSULE ORAL 2 TIMES DAILY
Qty: 180 CAPSULE | Refills: 1 | Status: SHIPPED | OUTPATIENT
Start: 2024-09-16

## 2024-09-16 RX ORDER — TRAMADOL HYDROCHLORIDE 50 MG/1
50 TABLET ORAL 2 TIMES DAILY PRN
Qty: 36 TABLET | Refills: 0 | Status: SHIPPED | OUTPATIENT
Start: 2024-09-16

## 2024-09-16 NOTE — TELEPHONE ENCOUNTER
Requested Prescriptions     Pending Prescriptions Disp Refills    traMADol 50 MG Oral Tab 36 tablet 0     Sig: Take 1 tablet (50 mg total) by mouth 2 (two) times daily as needed for Pain.     Last refill 8/26/24 #36  LOV 6/27/24  Future Appointments   Date Time Provider Department Center   11/8/2024  8:30 AM REF [facility] REF [facility] Ref [facility]   11/12/2024  9:00 AM Carlton Sosa, DO [facility] [place]

## 2024-09-16 NOTE — TELEPHONE ENCOUNTER
Medication: gabapentin     Date of last refill: 7/19/24  [place]     Last office visit: 9/5/24   Due back to clinic per last office note:  6 months  Date next office visit scheduled:    Future Appointments   Date Time Provider Department Center   11/8/2024  8:30 AM REF [facility] REF [facility] Ref [facility]   11/12/2024  9:00 AM Carlton Sosa, DO [facility] [place]           Last OV note recommendation:    Per Faviola:    Essential Tremor- improved with the gabapentin 100mg bid. Discussed that there is room to adjust the gabapentin if needed. Continue the Topamax 75mg bid.   Patient also made aware of other treatment options for essential tremor including DBS, MR Guided Focused Ultrasound, and Nicole Trio Device. Patient was also given information on essential tremor

## 2024-10-04 DIAGNOSIS — M17.31 POST-TRAUMATIC OSTEOARTHRITIS OF RIGHT KNEE: ICD-10-CM

## 2024-10-04 DIAGNOSIS — G89.18 POST-OP PAIN: Primary | ICD-10-CM

## 2024-10-04 NOTE — TELEPHONE ENCOUNTER
No protocol    OV 06/27/24  REFILL 09/16/24 #36    Future Appointments   Date Time Provider Department Center   10/31/2024  9:00 AM Golden Ramos MD [facility]   11/8/2024  8:30 AM REF [facility]F [facility] Ref [facility]   11/12/2024  9:00 AM Carlton Sosa, DO [facility] [place]

## 2024-10-04 NOTE — TELEPHONE ENCOUNTER
Please find out from patient how often he is using tramadol and for what pain, shoulder or knee or otherwise.  He is calling earlier than expected for refill

## 2024-10-05 NOTE — TELEPHONE ENCOUNTER
Spoke with patient and his wife who states patient is using the Tramadol for right shoulder pain. His pain has gotten a lot worse. Wife states he tears up in pain and feels \"something is not right\". Patient takes approximately 1-2 tablets a day. If he has PT then he takes one before and then 1 at bedtime. He has PT 3 times a week. If he has pushed it too hard doing yard work, he will take one. He does have a follow up appointment with Dr. Schneider next month. Advised to call on Monday to inform them that his pain is increased and to ask to speak with a nurse to try and get in sooner. Patient and wife were agreeable. Advised this nurse will forward this information to Dr. Sosa, and we can call them back on Monday. Patient states he has 6 tablets left.

## 2024-10-06 RX ORDER — TRAMADOL HYDROCHLORIDE 50 MG/1
50 TABLET ORAL 2 TIMES DAILY PRN
Qty: 56 TABLET | Refills: 0 | Status: SHIPPED | OUTPATIENT
Start: 2024-10-06

## 2024-10-16 ENCOUNTER — MED REC SCAN ONLY (OUTPATIENT)
Dept: FAMILY MEDICINE CLINIC | Facility: CLINIC | Age: 64
End: 2024-10-16

## 2024-10-31 PROBLEM — M19.011 OSTEOARTHRITIS OF RIGHT SHOULDER REGION: Status: ACTIVE | Noted: 2024-08-07

## 2024-11-08 ENCOUNTER — LABORATORY ENCOUNTER (OUTPATIENT)
Dept: LAB | Age: 64
End: 2024-11-08
Attending: FAMILY MEDICINE
Payer: COMMERCIAL

## 2024-11-08 DIAGNOSIS — E11.9 TYPE 2 DIABETES MELLITUS WITHOUT COMPLICATION, WITHOUT LONG-TERM CURRENT USE OF INSULIN (HCC): ICD-10-CM

## 2024-11-08 LAB
EST. AVERAGE GLUCOSE BLD GHB EST-MCNC: 143 MG/DL (ref 68–126)
HBA1C MFR BLD: 6.6 % (ref ?–5.7)

## 2024-11-08 PROCEDURE — 83036 HEMOGLOBIN GLYCOSYLATED A1C: CPT

## 2024-11-12 ENCOUNTER — OFFICE VISIT (OUTPATIENT)
Dept: FAMILY MEDICINE CLINIC | Facility: CLINIC | Age: 64
End: 2024-11-12
Payer: COMMERCIAL

## 2024-11-12 VITALS
SYSTOLIC BLOOD PRESSURE: 124 MMHG | OXYGEN SATURATION: 96 % | HEART RATE: 88 BPM | HEIGHT: 69 IN | DIASTOLIC BLOOD PRESSURE: 86 MMHG | RESPIRATION RATE: 18 BRPM | WEIGHT: 222.81 LBS | BODY MASS INDEX: 33 KG/M2 | TEMPERATURE: 98 F

## 2024-11-12 DIAGNOSIS — I48.92 ATRIAL FIBRILLATION AND FLUTTER (HCC): ICD-10-CM

## 2024-11-12 DIAGNOSIS — Z79.01 CURRENT USE OF LONG TERM ANTICOAGULATION: ICD-10-CM

## 2024-11-12 DIAGNOSIS — K21.9 GASTROESOPHAGEAL REFLUX DISEASE WITHOUT ESOPHAGITIS: ICD-10-CM

## 2024-11-12 DIAGNOSIS — I25.10 CORONARY ARTERY CALCIFICATION SEEN ON CAT SCAN: ICD-10-CM

## 2024-11-12 DIAGNOSIS — E11.9 TYPE 2 DIABETES MELLITUS WITHOUT COMPLICATION, WITHOUT LONG-TERM CURRENT USE OF INSULIN (HCC): Primary | ICD-10-CM

## 2024-11-12 DIAGNOSIS — I10 ESSENTIAL HYPERTENSION: ICD-10-CM

## 2024-11-12 DIAGNOSIS — I48.91 ATRIAL FIBRILLATION AND FLUTTER (HCC): ICD-10-CM

## 2024-11-12 DIAGNOSIS — E78.00 HYPERCHOLESTEREMIA: ICD-10-CM

## 2024-11-12 DIAGNOSIS — F17.200 TOBACCO DEPENDENCE: ICD-10-CM

## 2024-11-12 DIAGNOSIS — E66.811 OBESITY (BMI 30.0-34.9): ICD-10-CM

## 2024-11-12 DIAGNOSIS — R35.0 URINARY FREQUENCY: ICD-10-CM

## 2024-11-12 DIAGNOSIS — M19.011 ARTHROSIS OF RIGHT ACROMIOCLAVICULAR JOINT: ICD-10-CM

## 2024-11-12 LAB
APPEARANCE: CLEAR
BILIRUBIN: NEGATIVE
GLUCOSE (URINE DIPSTICK): NEGATIVE MG/DL
KETONES (URINE DIPSTICK): NEGATIVE MG/DL
LEUKOCYTES: NEGATIVE
MULTISTIX LOT#: NORMAL NUMERIC
NITRITE, URINE: NEGATIVE
OCCULT BLOOD: NEGATIVE
PH, URINE: 7 (ref 4.5–8)
PROTEIN (URINE DIPSTICK): NEGATIVE MG/DL
SPECIFIC GRAVITY: 1.02 (ref 1–1.03)
URINE-COLOR: YELLOW
UROBILINOGEN,SEMI-QN: 0.2 MG/DL (ref 0–1.9)

## 2024-11-12 PROCEDURE — 99214 OFFICE O/P EST MOD 30 MIN: CPT | Performed by: FAMILY MEDICINE

## 2024-11-12 PROCEDURE — 3079F DIAST BP 80-89 MM HG: CPT | Performed by: FAMILY MEDICINE

## 2024-11-12 PROCEDURE — 99406 BEHAV CHNG SMOKING 3-10 MIN: CPT | Performed by: FAMILY MEDICINE

## 2024-11-12 PROCEDURE — 3074F SYST BP LT 130 MM HG: CPT | Performed by: FAMILY MEDICINE

## 2024-11-12 PROCEDURE — 3008F BODY MASS INDEX DOCD: CPT | Performed by: FAMILY MEDICINE

## 2024-11-12 PROCEDURE — 3044F HG A1C LEVEL LT 7.0%: CPT | Performed by: FAMILY MEDICINE

## 2024-11-12 PROCEDURE — 81003 URINALYSIS AUTO W/O SCOPE: CPT | Performed by: FAMILY MEDICINE

## 2024-11-12 NOTE — PATIENT INSTRUCTIONS
1. Type 2 diabetes mellitus without complication, without long-term current use of insulin (HCC)  I reviewed goals for fasting and postmeal blood sugars as well as A1c.  I discussed the increasing A1c and likely contributing factors.  I strongly encourage patient to get out into some daily walking along with eliminating bread and other starches.  Recheck A1c 4 to 6 months.  Patient strongly encouraged to monitor his blood sugar at home    2. Essential hypertension  I reviewed goals for blood pressure as well as conservative management of hypertension including sodium restriction, daily aerobic activity, alcohol moderation, smoking cessation, and maintaining ideal body weight.  Patient encouraged to monitor his blood pressure at home, continue current meds    3. Atrial fibrillation and flutter (HCC)  Continue rate control and anticoagulation, follow-up with cardiology as scheduled    4. Hypercholesteremia  Reviewed goals for lipids.  Continue statin therapy, check labs annually    5. Coronary artery calcification seen on CAT scan  Discussed importance of risk factor modification    6. Gastroesophageal reflux disease without esophagitis  Antireflux measures reviewed.  Continue current meds and follow-up for upper endoscopy as scheduled    7. Tobacco dependence  5 minutes spent discussing smoking cessation strategies, behavior modification, medications, risk, benefits, side effects, anticipated response and duration of treatment.  Would strongly recommend starting bupropion.  Patient wishes to consider this option    8. Current use of long term anticoagulation  Continue long-term anticoagulation    9. Urinary frequency  I reviewed the results of the urinalysis.  Patient encouraged to increase fluid intake  - Urine Dip, auto without Micro    10. Arthrosis of right acromioclavicular joint  Activity as tolerated, monitor clinically, patient may want to consider shoulder replacement in the future    11. Obesity (BMI  30.0-34.9)  Discussed importance of lower carbohydrate food choices, avoidance of starches and daily aerobic activity    Patient again declines all vaccines

## 2024-11-12 NOTE — PROGRESS NOTES
Gabriel Calvomelvin Doss. is a 64 year old male.  Chief Complaint   Patient presents with    Diabetes    HTN    Lipids    Atrial Fibrillation    Tobacco Abuse    Gastro-esophageal Reflux     Here w/ wife  HPI:   A1C creeping up  Eating more bread  Retired, less active, gets out of breath  X 1 week complains of cloudy urine, foul-smelling and increased frequency  Current Outpatient Medications   Medication Sig Dispense Refill    traMADol 50 MG Oral Tab Take 1 tablet (50 mg total) by mouth 2 (two) times daily as needed for Pain. 56 tablet 0    gabapentin 100 MG Oral Cap Take 1 capsule (100 mg total) by mouth in the morning and 1 capsule (100 mg total) before bedtime. 180 capsule 1    ROSUVASTATIN 10 MG Oral Tab TAKE 1 TABLET BY MOUTH EVERY DAY IN THE EVENING 90 tablet 1    DICLOFENAC 50 MG Oral Tab EC TAKE 1 TABLET BY MOUTH TWICE A  tablet 1    SOLIFENACIN SUCCINATE 5 MG Oral Tab TAKE 1 TABLET BY MOUTH ONCE DAILY. 90 tablet 3    topiramate 25 MG Oral Tab Take 3 tablets (75 mg total) by mouth 2 (two) times daily. 540 tablet 3    Glucose Blood (BLOOD GLUCOSE TEST) In Vitro Strip As directed---use to check blood glucose once daily 100 strip 3    Blood Glucose Monitoring Suppl Does not apply Device As directed---use to check blood glucose once daily 1 each 0    Lancets Does not apply Misc As directed---use to check blood glucose once daily 100 each 3    metFORMIN 500 MG Oral Tab TAKE 1 TABLET BY MOUTH TWICE A DAY WITH FOOD 180 tablet 1    Multiple Vitamins-Minerals (EMERGEN-C IMMUNE OR) Take by mouth.      dilTIAZem  MG Oral Capsule SR 24 Hr Take 1 capsule (240 mg total) by mouth daily. 90 capsule 3    LANSOPRAZOLE 30 MG Oral Capsule Delayed Release TAKE 1 CAPSULE (30 MG TOTAL) BY MOUTH BEFORE BREAKFAST. 90 capsule 3    rivaroxaban (XARELTO) 20 MG Oral Tab Take 1 tablet (20 mg total) by mouth daily. 90 tablet 3    Vitamin C 500 MG Oral Tab Take 1 tablet (500 mg total) by mouth in the morning and 1 tablet (500 mg  total) before bedtime.        Past Medical History:    Atrial fibrillation (HCC)    Constipation    Esophageal reflux    Essential hypertension    Food intolerance    Milk products    Frequent use of laxatives    Clearlax daily    H/O Salmonella gastroenteritis    Hearing loss    Hearing aides    Heart palpitations    A-Fib    Hemorrhoids    Hypercholesteremia    Lung nodule    LLL, stable on serial CT scans since 2012    Obesity    Osteoarthritis of right knee    Tobacco dependence    Type 2 diabetes mellitus (HCC)      Social History:  Social History     Socioeconomic History    Marital status:    Tobacco Use    Smoking status: Every Day     Current packs/day: 0.50     Average packs/day: 0.5 packs/day for 40.0 years (20.0 ttl pk-yrs)     Types: Cigarettes     Passive exposure: Never    Smokeless tobacco: Never    Tobacco comments:     Pt has been cutting back, total 50 pk yr   Vaping Use    Vaping status: Never Used   Substance and Sexual Activity    Alcohol use: No    Drug use: No   Other Topics Concern    Caffeine Concern Yes     Comment: 1 cup a day & 1 soda a week    Exercise Yes     Comment: active at work, 3x week PT    Seat Belt Yes    Special Diet No    Stress Concern No    Weight Concern Yes        REVIEW OF SYSTEMS:   GENERAL: generally feels well, denies fatigue, denies excessive daytime drowsiness, wt stable since last visit  EYES:denies blurred vision or double vision, glasses/ contacts: +, exam 12/2/23 @ [facility]  ENT: denies nasal congestion and PND ,no ST, + snoring but no reported periods of apnea, denies hearing deficits  LUNGS: + shortness of breath with exertion since retiring, + morning cough, no wheezing , neg CXR in June  CARDIOVASCULAR: denies chest pain on exertion, palpations, anginal equivalent symptoms, no claudication , no peripheral edema, not checking bp   GI: denies heartburn, diarrhea, or change in bowel habits,no constipation, less constipation w/ miralax, patient takes  fiber supplement sporadically,more heartburn, pt saw Dr Ramos 10/31/24, plans for EGD and colonoscopy  : denies dysuria, hesitancy, +nocturia x 1 ,denies decreased urine stream, incontinence, erectile dysfunction, decreased libido  MUSCULOSKELETAL: denies back pain. + Chronic R>L knee pain and stiffness in hands-Diclofenac helpful  Having pain right shoulder x 1 1/2 yrs, getting worse, patient taking 2 times the maximum dose of acetaminophen during the day, tramadol 2/day,  significant cervical spondylosis , and severe thoracic stenosis @ T2-3  NEURO: denies headaches,  dizziness, numbness and weakness, +tremors-improved on Topiramate   PSYCHE: denies depression or anxiety, +disrupted sleep-chronic  HEMATOLOGIC: denies unexplained bruising or bleeding  ENDOCRINE: denies heat or cold intolerance , no unexplained wt loss or gain, not checking bs  EXAM:   /86 (BP Location: Left arm, Patient Position: Sitting, Cuff Size: adult)   Pulse 88   Temp 98.2 °F (36.8 °C) (Temporal)   Resp 18   Ht 5' 9\" (1.753 m)   Wt 222 lb 12.8 oz (101.1 kg)   SpO2 96%   BMI 32.90 kg/m²   GENERAL: well developed, well nourished,in no apparent distress  NECK: supple,no adenopathy,no bruits, no thyromegaly, symmetrical range of motion, negative Spurling sign  LUNGS: Distant breath sounds throughout, no wheezes, rhonchi, rales  CARDIO: Heart rate and rhythm are irregularly irregular without murmur, no S3, S4, 1+ pedal pulses bilaterally, no peripheral edema  EXTREMITIES: no cyanosis, clubbing or edema, FROM of all joints tested  Right shoulder: Patient is unable to actively raise arm above waist level due to pain, exquisite tenderness along the medial border of the scapula,, tender over AC joint  BILATERAL FOOT EXAM OF PLANTAR ASPECT: WITHOUT CALLUS, ULCERS OR GROSS DEFORMITIES, SENSATION INTACT TO MONOFILAMENT TESTING, 1+ DP / PT PULSES, NAILS W/ DYSTROPHIC CHANGES  NEURO: A &O X 3,cranial nerves are intact,motor and sensory are  grossly intact, DTRs +2/4 UE/LE bilaterally except reduced right brachial radialis reflex, mild fine tremor in the hands  PSYCH: affect: blunt, slightly anxious, speech: clear and coherent, Insight: fair  Laboratory Encounter on 11/08/2024   Component Date Value Ref Range Status    HgbA1C 11/08/2024 6.6 (H)  <5.7 % Final     Normal HbA1C:     <5.7%      Pre-Diabetic:     5.7 - 6.4%      Diabetic:         >6.4%      Diabetic Control: <7.0%        Estimated Average Glucose 11/08/2024 143 (H)  68 - 126 mg/dL Final    eAG is the estimated average glucose calculated from Hgb A1c according to the formula recommended by the American Diabetes Association. eAG levels reflect the long term average glucose and may not correlate with random or fasting glucose levels since these represent specific points in time.            Recent Results (from the past 24 hours)   Urine Dip, auto without Micro    Collection Time: 11/12/24  9:37 AM   Result Value Ref Range    Glucose Urine Negative Negative mg/dL    Bilirubin Urine Negative Negative    Ketones, UA Negative Negative - Trace mg/dL    Spec Gravity 1.025 1.005 - 1.030    Blood Urine Negative Negative    PH Urine 7.0 5.0 - 8.0    Protein Urine Negative Negative - Trace mg/dL    Urobilinogen Urine 0.2 0.2 - 1.0 mg/dL    Nitrite Urine Negative Negative    Leukocyte Esterase Urine Negative Negative    APPEARANCE clear Clear    Color Urine yellow Yellow    Multistix Lot# [id number] Numeric    Multistix Expiration Date 7/31/25 Date       ASSESSMENT AND PLAN:     Encounter Diagnoses   Name Primary?    Type 2 diabetes mellitus without complication, without long-term current use of insulin (HCC) Yes    Essential hypertension     Atrial fibrillation and flutter (HCC)     Hypercholesteremia     Coronary artery calcification seen on CAT scan     Gastroesophageal reflux disease without esophagitis     Tobacco dependence     Current use of long term anticoagulation     Urinary frequency      Arthrosis of right acromioclavicular joint     Obesity (BMI 30.0-34.9)      Orders Placed This Encounter   Procedures    Urine Dip, auto without Micro     Meds & Refills for this Visit:  Requested Prescriptions      No prescriptions requested or ordered in this encounter     Imaging & Consults:  None  No follow-ups on file.  Patient Instructions   1. Type 2 diabetes mellitus without complication, without long-term current use of insulin (HCC)  I reviewed goals for fasting and postmeal blood sugars as well as A1c.  I discussed the increasing A1c and likely contributing factors.  I strongly encourage patient to get out into some daily walking along with eliminating bread and other starches.  Recheck A1c 4 to 6 months.  Patient strongly encouraged to monitor his blood sugar at home    2. Essential hypertension  I reviewed goals for blood pressure as well as conservative management of hypertension including sodium restriction, daily aerobic activity, alcohol moderation, smoking cessation, and maintaining ideal body weight.  Patient encouraged to monitor his blood pressure at home, continue current meds    3. Atrial fibrillation and flutter (HCC)  Continue rate control and anticoagulation, follow-up with cardiology as scheduled    4. Hypercholesteremia  Reviewed goals for lipids.  Continue statin therapy, check labs annually    5. Coronary artery calcification seen on CAT scan  Discussed importance of risk factor modification    6. Gastroesophageal reflux disease without esophagitis  Antireflux measures reviewed.  Continue current meds and follow-up for upper endoscopy as scheduled    7. Tobacco dependence  5 minutes spent discussing smoking cessation strategies, behavior modification, medications, risk, benefits, side effects, anticipated response and duration of treatment.  Would strongly recommend starting bupropion.  Patient wishes to consider this option    8. Current use of long term anticoagulation  Continue  long-term anticoagulation    9. Urinary frequency  I reviewed the results of the urinalysis.  Patient encouraged to increase fluid intake  - Urine Dip, auto without Micro    10. Arthrosis of right acromioclavicular joint  Activity as tolerated, monitor clinically, patient may want to consider shoulder replacement in the future    11. Obesity (BMI 30.0-34.9)  Discussed importance of lower carbohydrate food choices, avoidance of starches and daily aerobic activity    Patient again declines all vaccines  The patient indicates understanding of these issues and agrees to the plan.    Carlton Sosa DO, FAAFP

## 2024-11-16 DIAGNOSIS — G89.18 POST-OP PAIN: ICD-10-CM

## 2024-11-16 DIAGNOSIS — M17.31 POST-TRAUMATIC OSTEOARTHRITIS OF RIGHT KNEE: ICD-10-CM

## 2024-11-16 RX ORDER — TOPIRAMATE 25 MG/1
75 TABLET, FILM COATED ORAL 2 TIMES DAILY
Qty: 540 TABLET | Refills: 3 | Status: CANCELLED | OUTPATIENT
Start: 2024-11-16

## 2024-11-18 DIAGNOSIS — M17.31 POST-TRAUMATIC OSTEOARTHRITIS OF RIGHT KNEE: ICD-10-CM

## 2024-11-18 DIAGNOSIS — G89.18 POST-OP PAIN: ICD-10-CM

## 2024-11-18 RX ORDER — TRAMADOL HYDROCHLORIDE 50 MG/1
50 TABLET ORAL 2 TIMES DAILY PRN
Qty: 56 TABLET | Refills: 0 | OUTPATIENT
Start: 2024-11-18

## 2024-11-18 RX ORDER — TRAMADOL HYDROCHLORIDE 50 MG/1
50 TABLET ORAL 2 TIMES DAILY PRN
Qty: 56 TABLET | Refills: 0 | Status: SHIPPED | OUTPATIENT
Start: 2024-11-18

## 2024-11-18 NOTE — TELEPHONE ENCOUNTER
Medication: Topiramate 25 mg     Date of last refill:08/13/2024 with 3 addt refills # 540  [place]     Last office visit: 9/5/24   Due back to clinic per last office note:  6 months  Date next office visit scheduled:           Future Appointments   Date Time Provider Department Center   11/8/2024  8:30 AM REF [facility] Ref [facility] [place]   11/12/2024  9:00 AM Carlton Sosa, DO [facility] [place]            Last OV note recommendation:     Per Faviola:    Essential Tremor- improved with the gabapentin 100mg bid. Discussed that there is room to adjust the gabapentin if needed. Continue the Topamax 75mg bid.   Patient also made aware of other treatment options for essential tremor including DBS, MR Guided Focused Ultrasound, and Nicole Trio Device. Patient was also given information on essential tremor

## 2024-11-18 NOTE — TELEPHONE ENCOUNTER
Last office visit:11/12/24   Future Appointments   Date Time Provider Department Center   1/7/2025  3:45 PM Golden Ramos MD [facility] SUB GI   1/7/2025  4:00 PM Golden Ramos MD [facility] SUB GI   Last filled:10/6/24 #56 with 0 RF  Last labs: 5/17/24

## 2024-12-05 RX ORDER — LANSOPRAZOLE 30 MG/1
30 CAPSULE, DELAYED RELEASE ORAL
Qty: 90 CAPSULE | Refills: 3 | Status: SHIPPED | OUTPATIENT
Start: 2024-12-05

## 2024-12-05 NOTE — TELEPHONE ENCOUNTER
Last refill:  03/07/24  qtY: 90  W/ 3 refills  Last ov; 11/12/24  Last labs 05/17/24 comp    Requested Prescriptions     Pending Prescriptions Disp Refills    LANSOPRAZOLE 30 MG Oral Capsule Delayed Release [Pharmacy Med Name: LANSOPRAZOLE DR 30 MG CAPSULE] 90 capsule 3     Sig: TAKE 1 CAPSULE (30 MG TOTAL) BY MOUTH BEFORE BREAKFAST.     Future Appointments   Date Time Provider Department Center   1/7/2025  3:45 PM Golden Ramos MD [facility] SUB GI   1/7/2025  4:00 PM Golden Ramos MD [facility] SUB GI

## 2024-12-21 NOTE — TELEPHONE ENCOUNTER
OV 11/12/24  LABS 11/08/24 A1C 6.6    REFILL 05/30/24 #180 +1 RF    Future Appointments   Date Time Provider Department Center   1/7/2025  3:45 PM Golden Ramos MD [facility] SUB GI   1/7/2025  4:00 PM Golden Ramos MD [facility] SUB GI     A1C due 05/12/25

## 2025-01-07 PROBLEM — K52.29 OTHER ALLERGIC AND DIETETIC GASTROENTERITIS AND COLITIS: Status: ACTIVE | Noted: 2025-01-07

## 2025-01-07 PROBLEM — R12 CHRONIC HEARTBURN: Status: ACTIVE | Noted: 2025-01-07

## 2025-01-07 PROBLEM — K29.50 UNSPECIFIED CHRONIC GASTRITIS WITHOUT BLEEDING: Status: ACTIVE | Noted: 2025-01-07

## 2025-01-07 PROBLEM — Z86.0100 PERSONAL HISTORY OF COLON POLYPS, UNSPECIFIED: Status: ACTIVE | Noted: 2025-01-07

## 2025-01-07 PROBLEM — D12.2 BENIGN NEOPLASM OF ASCENDING COLON: Status: ACTIVE | Noted: 2025-01-07

## 2025-02-03 ENCOUNTER — LABORATORY ENCOUNTER (OUTPATIENT)
Dept: LAB | Age: 65
End: 2025-02-03
Attending: FAMILY MEDICINE
Payer: COMMERCIAL

## 2025-02-03 DIAGNOSIS — Z79.01 CURRENT USE OF LONG TERM ANTICOAGULATION: ICD-10-CM

## 2025-02-03 DIAGNOSIS — E11.9 TYPE 2 DIABETES MELLITUS WITHOUT COMPLICATION, WITHOUT LONG-TERM CURRENT USE OF INSULIN (HCC): Primary | ICD-10-CM

## 2025-02-03 DIAGNOSIS — E78.00 HYPERCHOLESTEREMIA: ICD-10-CM

## 2025-02-03 LAB
ALBUMIN SERPL-MCNC: 4.8 G/DL (ref 3.2–4.8)
ALBUMIN/GLOB SERPL: 2.3 {RATIO} (ref 1–2)
ALP LIVER SERPL-CCNC: 66 U/L
ALT SERPL-CCNC: 97 U/L
ANION GAP SERPL CALC-SCNC: 11 MMOL/L (ref 0–18)
AST SERPL-CCNC: 41 U/L (ref ?–34)
BASOPHILS # BLD AUTO: 0.04 X10(3) UL (ref 0–0.2)
BASOPHILS NFR BLD AUTO: 0.5 %
BILIRUB SERPL-MCNC: 0.5 MG/DL (ref 0.2–1.1)
BUN BLD-MCNC: 17 MG/DL (ref 9–23)
CALCIUM BLD-MCNC: 9.4 MG/DL (ref 8.7–10.6)
CHLORIDE SERPL-SCNC: 108 MMOL/L (ref 98–112)
CHOLEST SERPL-MCNC: 98 MG/DL (ref ?–200)
CO2 SERPL-SCNC: 24 MMOL/L (ref 21–32)
CREAT BLD-MCNC: 1.05 MG/DL
EGFRCR SERPLBLD CKD-EPI 2021: 79 ML/MIN/1.73M2 (ref 60–?)
EOSINOPHIL # BLD AUTO: 0.03 X10(3) UL (ref 0–0.7)
EOSINOPHIL NFR BLD AUTO: 0.4 %
ERYTHROCYTE [DISTWIDTH] IN BLOOD BY AUTOMATED COUNT: 13.4 %
EST. AVERAGE GLUCOSE BLD GHB EST-MCNC: 128 MG/DL (ref 68–126)
FASTING PATIENT LIPID ANSWER: YES
FASTING STATUS PATIENT QL REPORTED: YES
GLOBULIN PLAS-MCNC: 2.1 G/DL (ref 2–3.5)
GLUCOSE BLD-MCNC: 120 MG/DL (ref 70–99)
HBA1C MFR BLD: 6.1 % (ref ?–5.7)
HCT VFR BLD AUTO: 50.5 %
HDLC SERPL-MCNC: 24 MG/DL (ref 40–59)
HGB BLD-MCNC: 16.9 G/DL
IMM GRANULOCYTES # BLD AUTO: 0.02 X10(3) UL (ref 0–1)
IMM GRANULOCYTES NFR BLD: 0.2 %
LDLC SERPL CALC-MCNC: 56 MG/DL (ref ?–100)
LYMPHOCYTES # BLD AUTO: 0.93 X10(3) UL (ref 1–4)
LYMPHOCYTES NFR BLD AUTO: 11.1 %
MCH RBC QN AUTO: 32.5 PG (ref 26–34)
MCHC RBC AUTO-ENTMCNC: 33.5 G/DL (ref 31–37)
MCV RBC AUTO: 97.1 FL
MONOCYTES # BLD AUTO: 0.92 X10(3) UL (ref 0.1–1)
MONOCYTES NFR BLD AUTO: 11 %
NEUTROPHILS # BLD AUTO: 6.44 X10 (3) UL (ref 1.5–7.7)
NEUTROPHILS # BLD AUTO: 6.44 X10(3) UL (ref 1.5–7.7)
NEUTROPHILS NFR BLD AUTO: 76.8 %
NONHDLC SERPL-MCNC: 74 MG/DL (ref ?–130)
OSMOLALITY SERPL CALC.SUM OF ELEC: 299 MOSM/KG (ref 275–295)
PLATELET # BLD AUTO: 157 10(3)UL (ref 150–450)
POTASSIUM SERPL-SCNC: 4.3 MMOL/L (ref 3.5–5.1)
PROT SERPL-MCNC: 6.9 G/DL (ref 5.7–8.2)
RBC # BLD AUTO: 5.2 X10(6)UL
SODIUM SERPL-SCNC: 143 MMOL/L (ref 136–145)
TRIGL SERPL-MCNC: 90 MG/DL (ref 30–149)
VLDLC SERPL CALC-MCNC: 13 MG/DL (ref 0–30)
WBC # BLD AUTO: 8.4 X10(3) UL (ref 4–11)

## 2025-02-03 PROCEDURE — 83036 HEMOGLOBIN GLYCOSYLATED A1C: CPT

## 2025-02-03 PROCEDURE — 36415 COLL VENOUS BLD VENIPUNCTURE: CPT

## 2025-02-03 PROCEDURE — 80061 LIPID PANEL: CPT

## 2025-02-03 PROCEDURE — 80053 COMPREHEN METABOLIC PANEL: CPT

## 2025-02-03 PROCEDURE — 85025 COMPLETE CBC W/AUTO DIFF WBC: CPT

## 2025-02-23 DIAGNOSIS — G89.18 POST-OP PAIN: ICD-10-CM

## 2025-02-23 DIAGNOSIS — M17.31 POST-TRAUMATIC OSTEOARTHRITIS OF RIGHT KNEE: ICD-10-CM

## 2025-02-24 RX ORDER — TRAMADOL HYDROCHLORIDE 50 MG/1
50 TABLET ORAL 2 TIMES DAILY PRN
Qty: 56 TABLET | Refills: 0 | Status: SHIPPED | OUTPATIENT
Start: 2025-02-24

## 2025-02-24 NOTE — TELEPHONE ENCOUNTER
Last refill: 11/18/24  qtY: 56 tabs  W/ 0 refills  Last ov: 11/12/24    Requested Prescriptions     Pending Prescriptions Disp Refills    TRAMADOL 50 MG Oral Tab [Pharmacy Med Name: TRAMADOL HCL 50 MG TABLET] 56 tablet 0     Sig: TAKE 1 TABLET BY MOUTH 2 TIMES DAILY AS NEEDED FOR PAIN.     Future Appointments   Date Time Provider Department Center   3/13/2025  8:00 AM Carlton Sosa, DO [facility] [place]

## 2025-03-04 RX ORDER — ROSUVASTATIN CALCIUM 10 MG/1
10 TABLET, COATED ORAL EVERY EVENING
Qty: 90 TABLET | Refills: 3 | Status: SHIPPED | OUTPATIENT
Start: 2025-03-04

## 2025-03-04 RX ORDER — DILTIAZEM HYDROCHLORIDE 240 MG/1
240 CAPSULE, COATED, EXTENDED RELEASE ORAL DAILY
Qty: 90 CAPSULE | Refills: 3 | OUTPATIENT
Start: 2025-03-04

## 2025-03-04 NOTE — TELEPHONE ENCOUNTER
OV 11/12/24  LABS 02/03/25    REFILL  -Diltiazem  mg 04/25/24 #90 +3 RF (requested too soon)    -Rosuvastatin 10 mg 08/17/24 #90 +1 RF  -Diclofenac 50 mg 08/17/24 #180 +1 RF    Future Appointments   Date Time Provider Department Center   3/13/2025  8:00 AM Carlton Sosa, DO [facility] [place]

## 2025-03-05 ENCOUNTER — TELEPHONE (OUTPATIENT)
Dept: FAMILY MEDICINE CLINIC | Facility: CLINIC | Age: 65
End: 2025-03-05

## 2025-03-05 PROBLEM — E11.21 CONTROLLED TYPE 2 DIABETES MELLITUS WITH DIABETIC NEPHROPATHY, WITHOUT LONG-TERM CURRENT USE OF INSULIN (HCC): Status: ACTIVE | Noted: 2025-03-05

## 2025-03-05 PROBLEM — E11.21 CONTROLLED TYPE 2 DIABETES MELLITUS WITH DIABETIC NEPHROPATHY, WITHOUT LONG-TERM CURRENT USE OF INSULIN (HCC): Status: RESOLVED | Noted: 2025-03-05 | Resolved: 2025-03-05

## 2025-03-05 NOTE — TELEPHONE ENCOUNTER
Patient should have enough supply through mid April. Called and spoke with wife and she is in agreement that he has about 30 left.     Pt has appt next week with Dr oSsa and I advised them to let him know at appt if any refills are needed. Wife in agreement

## 2025-03-05 NOTE — TELEPHONE ENCOUNTER
Patient needs a refill on -     DILTIAZEM  MG Oral Capsule SR 24 Hr     Please call to discuss- thank you

## 2025-03-05 NOTE — H&P
Seen and examined.    Agree with scanned OP exam and assessment as above, no interval changes.    [facility] today to evaluate hemodynamics, LVEF and coronary anatomy.    Procedures, indications, R/B/A reviewed with pt and his wife.  Xarelto has been on hold in anticipation of procedure.    Acceptable risk for IV conscious sedation.    KAYLI Hutchins MD

## 2025-03-06 NOTE — PAT NURSING NOTE
Per PAT encounter/MyChart message sent to pt/wife took notes:    PreOp Instructions     You are scheduled for: a Cardiac Procedure     Date of Procedure: 03/11/25 Tuesday     Diet Instructions: Do not eat or drink anything after midnight including gum, mints, candy, etc.     Medications: Medications you are allowed to take can be taken with a sip of water the morning of your procedure     Do not take the following Blood Thinner(s): Last dose of Xarelto will be Saturday evening 3/8; Do NOT take your doses on Viet 3/9 and Monday 3/10.     Medications to Stop: Hold herbal supplements and vitamins     Diabetic Instructions: Metformin needs to be held prior to procedure, your last dose should be Saturday evening 3/8. Do NOT take your doses Viet 3/9, Monday 3/10, and Tuesday morning 3/11.     Skin Prep : Shower with antibacterial soap using a clean washcloth, prior to procedure. Once dried off, no lotions/powders/creams/ointments, etc., Do not shave the procedure area, this will be completed at the hospital during the preparation phase.     Arrival Time: The day prior to your procedure (Monday) you will receive a phone call before 6:00 pm with your arrival time. If you haven't received a phone call, please check your voicemail messages., If you did not receive a voice mail and it is after 6:00 pm, please call the nursing supervisor at [phone].    Driving After Procedure: Sedation will be given so you WILL NOT be able to drive home. You will need a responsible adult  to drive you home. You can NOT take uber or taxi unless approved by your physician in advance.     Discharge Teaching: Your nurse will give you specific instructions before discharge, Most people can resume normal activities in 2-3 days, Any questions, please call the physician's office      parking is available starting at 6 am or park in the [place] parking garage at [facility]. Check in at the [facility] reception desk. Our   will be there to check you in for your procedure. Please bring your insurance cards and ID with you.                                                                                                                                      Please DO NOT respond to this message, the inbasket is not monitored for messages. For any questions, please call the physician's office.

## 2025-03-11 ENCOUNTER — HOSPITAL ENCOUNTER (OUTPATIENT)
Dept: INTERVENTIONAL RADIOLOGY/VASCULAR | Facility: HOSPITAL | Age: 65
Discharge: HOME | End: 2025-03-11
Attending: INTERNAL MEDICINE | Admitting: INTERNAL MEDICINE
Payer: COMMERCIAL

## 2025-03-11 VITALS
RESPIRATION RATE: 23 BRPM | SYSTOLIC BLOOD PRESSURE: 117 MMHG | HEART RATE: 89 BPM | WEIGHT: 222 LBS | BODY MASS INDEX: 32.88 KG/M2 | HEIGHT: 69 IN | DIASTOLIC BLOOD PRESSURE: 100 MMHG | TEMPERATURE: 98 F | OXYGEN SATURATION: 94 %

## 2025-03-11 DIAGNOSIS — E11.9 DM (DIABETES MELLITUS) (HCC): ICD-10-CM

## 2025-03-11 DIAGNOSIS — R06.09 DOE (DYSPNEA ON EXERTION): ICD-10-CM

## 2025-03-11 PROBLEM — R94.39 ABNORMAL NUCLEAR STRESS TEST: Status: ACTIVE | Noted: 2025-03-11

## 2025-03-11 LAB — GLUCOSE BLD-MCNC: 108 MG/DL (ref 70–99)

## 2025-03-11 PROCEDURE — 4A023N8 MEASUREMENT OF CARDIAC SAMPLING AND PRESSURE, BILATERAL, PERCUTANEOUS APPROACH: ICD-10-PCS | Performed by: INTERNAL MEDICINE

## 2025-03-11 PROCEDURE — 99153 MOD SED SAME PHYS/QHP EA: CPT | Performed by: INTERNAL MEDICINE

## 2025-03-11 PROCEDURE — 93460 R&L HRT ART/VENTRICLE ANGIO: CPT | Performed by: INTERNAL MEDICINE

## 2025-03-11 PROCEDURE — B2151ZZ FLUOROSCOPY OF LEFT HEART USING LOW OSMOLAR CONTRAST: ICD-10-PCS | Performed by: INTERNAL MEDICINE

## 2025-03-11 PROCEDURE — 82962 GLUCOSE BLOOD TEST: CPT

## 2025-03-11 PROCEDURE — 99152 MOD SED SAME PHYS/QHP 5/>YRS: CPT | Performed by: INTERNAL MEDICINE

## 2025-03-11 PROCEDURE — B2111ZZ FLUOROSCOPY OF MULTIPLE CORONARY ARTERIES USING LOW OSMOLAR CONTRAST: ICD-10-PCS | Performed by: INTERNAL MEDICINE

## 2025-03-11 RX ORDER — SODIUM CHLORIDE 9 MG/ML
INJECTION, SOLUTION INTRAVENOUS
Status: DISCONTINUED | OUTPATIENT
Start: 2025-03-12 | End: 2025-03-11 | Stop reason: HOSPADM

## 2025-03-11 RX ORDER — ACETAMINOPHEN 500 MG
1000 TABLET ORAL ONCE
Status: COMPLETED | OUTPATIENT
Start: 2025-03-11 | End: 2025-03-11

## 2025-03-11 RX ORDER — HEPARIN SODIUM 5000 [USP'U]/ML
INJECTION, SOLUTION INTRAVENOUS; SUBCUTANEOUS
Status: COMPLETED
Start: 2025-03-11 | End: 2025-03-11

## 2025-03-11 RX ORDER — ACETAMINOPHEN 500 MG
TABLET ORAL
Status: COMPLETED
Start: 2025-03-11 | End: 2025-03-11

## 2025-03-11 RX ORDER — MIDAZOLAM HYDROCHLORIDE 1 MG/ML
INJECTION INTRAMUSCULAR; INTRAVENOUS
Status: COMPLETED
Start: 2025-03-11 | End: 2025-03-11

## 2025-03-11 RX ORDER — LIDOCAINE HYDROCHLORIDE 10 MG/ML
INJECTION, SOLUTION EPIDURAL; INFILTRATION; INTRACAUDAL; PERINEURAL
Status: COMPLETED
Start: 2025-03-11 | End: 2025-03-11

## 2025-03-11 RX ADMIN — ACETAMINOPHEN 1000 MG: 500 MG TABLET ORAL at 15:00:00

## 2025-03-11 NOTE — PROGRESS NOTES
Assumed care of pt. Recovery complete. Pt ambulated in hallway and voided. R fem sites remained CDI with no bleeding or hematoma noted. VSS. Denied pain. Pt has no questions regarding discharge. IV d/c'd. Pt to lobby via WC and wife to drive home.

## 2025-03-11 NOTE — PROCEDURES
[facility]    PATIENT'S NAME: ANTONIO MOLINA JR.   ATTENDING PHYSICIAN: Kamaljit Hutchins M.D.   OPERATING PHYSICIAN: Kamaljit Hutchins M.D.   PATIENT ACCOUNT#:   [id number]    LOCATION:  [address]  MEDICAL RECORD #:   [id number]       YOB: 1960  ADMISSION DATE:       03/11/2025      OPERATION DATE:  03/11/2025    CARDIAC PROCEDURE TRANSCRIPTION      CARDIAC CATHETERIZATION    PREOPERATIVE DIAGNOSIS:  Abnormal nuclear stress test.  Exertional dyspnea.  Longstanding cigarette smoker.  POSTOPERATIVE DIAGNOSIS:  Abnormal nuclear stress test.  Exertional dyspnea.  Longstanding cigarette smoker.  PROCEDURE PERFORMED:      PROCEDURAL COMMENTS:  Right heart catheterization, left heart catheterization, left ventriculography, and selective coronary angiography were performed via the right femoral artery and vein.  A 7-Grenadian PWP catheter was used for right-sided pressure determinations.  A 6-Grenadian pigtail, JL5 and JR4 catheters were used for the arterial system.  Following completion of the diagnostic procedure, an attempt was made to place a Mynx arterial closure device.  The device could not be fully advanced to deploy.  The decision was made to proceed with removal of both the arterial and venous sheath with manual pressure.  The patient tolerated the procedure well, with no immediate complications.    FLUOROSCOPY:  The coronary arteries are calcified.    HEMODYNAMICS:  Right atrial mean pressure is 15.  Right ventricular systolic pressure is 38.  PA pressure is 38/19 with a mean of 28.  Mean pulmonary capillary wedge pressure is 20.  Aortic pressure is 120/83 with a mean of 63.  The LVEDP was 10 to 20 mmHg.  No gradient was noted across aortic valve on pullback.    Oxygen saturations on room air were 90% for the aorta and 62% for the pulmonary artery.  Based on these measurements, the calculated cardiac output by the Lawanda is method is 4.7 L/minute.  The cardiac index is 2.2, with  pulmonary vascular resistance of 1.7 Wood units.     LEFT VENTRICULOGRAPHY:  The rhythm is atrial fibrillation.  Left ventricular size appears normal.  Left ventricular systolic function appears uniformly normal.  The visually estimated LVEF is 65%.  No mitral valve regurgitation is noted.  The aortic sinuses are slightly large in size.    A JL4 catheter was too short to reach the left coronary cusp.  A JL5 catheter was used for selective coronary angiography.  The left main coronary artery appeared free of significant disease.  It bifurcated into left anterior descending and circumflex coronary distributions.  There is a long segment of nonobstructive disease in the proximal LAD, up to 30% to 40% luminal stenosis in maximum severity.  A large first diagonal branch of the LAD has a moderate, 40% to 50% midvessel stenosis.  The remainder of the left anterior descending circulation has nonobstructive disease.  The circumflex coronary artery has a mild to moderate nonobstructive disease with luminal narrowings up to 30% severity in the proximal and mid segments.  No high-grade obstructive epicardial CAD is noted in the left coronary circulation.    The right coronary artery was cannulated with a JR4 catheter.  Diffuse mild nonobstructive plaquing is noted in the proximal and mid RCA.  In the distal RCA, just proximal to the crux, there is a 30% to 40% luminal narrowing.  There is a 30% to 40% luminal narrowing noted in the mid segment of the posterior descending branch of the RCA.    CONCLUSIONS:    1.   Mildly elevated CVP and mildly elevated pulmonary artery pressures.  Minimally elevated LVEDP.  2.   Normal left ventricular systolic function.  3.   Right dominant coronary artery circulation with moderate nonobstructive epicardial CAD as described above.    RECOMMENDATIONS:  The above findings do not provide diagnostic answer for the etiology of the patient's exertional dyspnea.  Continue medical therapy and risk  factor modification to secondary prevention targets.  Workup for noncardiac causes of exertional dyspnea as outpatient.    Dictated By Kamaljit Hutchins M.D.  d: 03/11/2025 16:57:49  t: 03/11/2025 17:05:27  Job [id number]  SC/

## 2025-03-11 NOTE — PROGRESS NOTES
Pt s/p R/LHC via right femoral vein/artery, manual hold. Right groin site soft, no bleeding noted, palpable right pedal pulse. VSS. Pt awake and tolerating PO. After 3 hours flat, HOB raised to 30 degrees, groin remains soft and dressing is c/d/I. Report given to GEOVANNY Hernandez and groin assessed together at bedside.

## 2025-03-11 NOTE — DISCHARGE INSTRUCTIONS
HOME CARE INSTRUCTIONS FOLLOWING CORONARY ANGIOGRAPHY, PERIPHERAL ANGIOGRAPHY, ANGIOPLASTY (PTCA/PTA) OR INSERTION OF STENT IN THE CORONARY, CAROTID, AND/OR PERIPHERAL ARTERIES      HOLD METFORMIN FOR AN ADDITIONAL 48 HOURS, YOU MAY RESUME THURSDAY PM.      ACTIVITY:  ~  DO NOT drive after the procedure. You may resume driving on Thursday.   ~  Plan on resting and relaxing tonight and tomorrow. You may resume your normal activity as tolerated after 48 hours.  ~  Do not lift anything heavy over 10 pounds for the next 48 hours and 20 pounds for a week for groin access.   ~  Do not lift more than 5 pounds for the next 48 hours and 10 pounds for a week with the right hand if radial access was used.   ~ Avoid sexual activity for the next 24 hours.  ~ Avoid drinking alcohol for the next 24 hours.   ~ If the groin site was used, avoid repeated stair use and excessive walking for the next 24 hours.    WHAT IS NORMAL?  ~ A small lump at the procedure site associated with mild tenderness when touched  ~ The procedure site may be bruised or discolored.   ~ There may be a small amount of drainage on the bandage.     SPECIAL INSTRUCTIONS:   ~ Drink plenty of fluids during the next 24 hours to \"flush\" the contrast from your system.   ~ The bandage is to remain in place for 24 hours.  ~ Keep the bandage clean and dry.  ~ DO NOT submerge the procedure site for 72 hours (NO tub baths or pools)   ~ After 24 hours, you MUST remove the bandage.  ~ You should shower AFTER removing the bandage and wash the procedure site GENTLY with soap and water. You do not have to cover it up. You may apply a bandaid for 24 hours if you prefer.     WHEN YOU SHOULD NOTIFY YOUR PHYSICIAN:  ~ Bleeding can occur at the procedure site. Both on the outside of the skin and/or beneath the surface of the skin.   ~ Swelling or a large lump at the procedure site can occur, which may be accompanied by moderate to severe pain.     IF EITHER OF THE ABOVE OCCURS,  LIE DOWN FLAT, AND HAVE SOMEONE APPLY PRESSURE TO THE PROCEDURE SITE WITH BOTH HANDS AS INSTRUCTED BY YOUR NURSE. HOLD PRESSURE FOR 20 MINUTES AND THE BLEEDING SHOULD STOP. NOTIFY YOUR PHYSICIAN OF THE OCCURRENCE.     IF THE BLEEDING DOES NOT STOP, CALL 911 AND CONTINUE TO APPLY PRESSURE     ~ If you experience signs of a fever, temperature greater than 101, chills, infection (redness, swelling, thick yellow drainage, or a foul odor from procedure site)   ~ If you notice any numbness, tingling or loss of feeling to your leg, foot  for groin access and loss of feeling to your fingers, or hand if the wrist was used.       OTHER:   ~ You may resume your present diet and medications as prescribed unless otherwise directed by your physician.   ~ Follow up with Dr Hutchins as scheduled.

## 2025-03-11 NOTE — PROGRESS NOTES
[facility]  [facility]  Preliminary Cath Note    Gabriel Gamino Jr. Location: Cath lab     MRN [id number]   Admission Date 3/11/2025 Operation Date 3/11/2025   Attending Physician Kamaljit Hutchins MD Operating Physician Kamaljit Hutchins MD     Pre-Cath Diagnosis:FINK, abnormal nuclear stress test.    Post-Cath Diagnosis: Same as above    Procedure Performed: RHC, LHC, LC angio, coronary angio.  Manual hold.  Conscious sedation: Versed 4 mg,  Fentanyl 50 mics, [id number].    Summary of Case: Mild pulmonary HTN.  Normal LV.  Non-obstructive CAD.  Failed mynx. Medical therapy.  Full report to follow.    Kamaljit Hutchins MD  3/11/2025  2:02 PM

## 2025-03-13 ENCOUNTER — OFFICE VISIT (OUTPATIENT)
Dept: FAMILY MEDICINE CLINIC | Facility: CLINIC | Age: 65
End: 2025-03-13
Payer: COMMERCIAL

## 2025-03-13 VITALS
SYSTOLIC BLOOD PRESSURE: 122 MMHG | HEART RATE: 88 BPM | RESPIRATION RATE: 22 BRPM | HEIGHT: 67.5 IN | DIASTOLIC BLOOD PRESSURE: 64 MMHG | WEIGHT: 221 LBS | OXYGEN SATURATION: 95 % | BODY MASS INDEX: 34.28 KG/M2 | TEMPERATURE: 98 F

## 2025-03-13 DIAGNOSIS — R06.09 DOE (DYSPNEA ON EXERTION): ICD-10-CM

## 2025-03-13 DIAGNOSIS — Z12.5 SCREENING FOR PROSTATE CANCER: ICD-10-CM

## 2025-03-13 DIAGNOSIS — D12.6 TUBULAR ADENOMA OF COLON: ICD-10-CM

## 2025-03-13 DIAGNOSIS — M47.812 CERVICAL SPONDYLOSIS: ICD-10-CM

## 2025-03-13 DIAGNOSIS — Z98.890 S/P SHOULDER SURGERY: ICD-10-CM

## 2025-03-13 DIAGNOSIS — M48.04 THORACIC SPINAL STENOSIS: ICD-10-CM

## 2025-03-13 DIAGNOSIS — Z79.01 CURRENT USE OF LONG TERM ANTICOAGULATION: ICD-10-CM

## 2025-03-13 DIAGNOSIS — E11.9 TYPE 2 DIABETES MELLITUS WITHOUT COMPLICATION, WITHOUT LONG-TERM CURRENT USE OF INSULIN (HCC): ICD-10-CM

## 2025-03-13 DIAGNOSIS — F17.200 TOBACCO DEPENDENCE: ICD-10-CM

## 2025-03-13 DIAGNOSIS — I48.91 ATRIAL FIBRILLATION AND FLUTTER (HCC): ICD-10-CM

## 2025-03-13 DIAGNOSIS — I48.92 ATRIAL FIBRILLATION AND FLUTTER (HCC): ICD-10-CM

## 2025-03-13 DIAGNOSIS — M17.31 POST-TRAUMATIC OSTEOARTHRITIS OF RIGHT KNEE: ICD-10-CM

## 2025-03-13 DIAGNOSIS — Z80.42 FAMILY HISTORY OF PROSTATE CANCER: ICD-10-CM

## 2025-03-13 DIAGNOSIS — M19.011 PRIMARY OSTEOARTHRITIS OF RIGHT SHOULDER: ICD-10-CM

## 2025-03-13 DIAGNOSIS — I10 ESSENTIAL HYPERTENSION: ICD-10-CM

## 2025-03-13 DIAGNOSIS — Z00.00 PREVENTATIVE HEALTH CARE: Primary | ICD-10-CM

## 2025-03-13 DIAGNOSIS — K29.60 EROSIVE GASTRITIS: ICD-10-CM

## 2025-03-13 DIAGNOSIS — Z28.21 VACCINATION NOT CARRIED OUT BECAUSE OF PATIENT REFUSAL: ICD-10-CM

## 2025-03-13 DIAGNOSIS — G25.0 ESSENTIAL TREMOR: ICD-10-CM

## 2025-03-13 DIAGNOSIS — I25.10 CORONARY ARTERY DISEASE INVOLVING NATIVE CORONARY ARTERY OF NATIVE HEART WITHOUT ANGINA PECTORIS: ICD-10-CM

## 2025-03-13 DIAGNOSIS — E78.00 HYPERCHOLESTEREMIA: ICD-10-CM

## 2025-03-13 DIAGNOSIS — K21.9 GASTROESOPHAGEAL REFLUX DISEASE WITHOUT ESOPHAGITIS: ICD-10-CM

## 2025-03-13 PROBLEM — K29.50 UNSPECIFIED CHRONIC GASTRITIS WITHOUT BLEEDING: Status: RESOLVED | Noted: 2025-01-07 | Resolved: 2025-03-13

## 2025-03-13 PROBLEM — D12.2 BENIGN NEOPLASM OF ASCENDING COLON: Status: RESOLVED | Noted: 2025-01-07 | Resolved: 2025-03-13

## 2025-03-13 PROBLEM — Z86.0100 PERSONAL HISTORY OF COLON POLYPS, UNSPECIFIED: Status: RESOLVED | Noted: 2025-01-07 | Resolved: 2025-03-13

## 2025-03-13 RX ORDER — ALBUTEROL SULFATE 90 UG/1
2 INHALANT RESPIRATORY (INHALATION) EVERY 4 HOURS PRN
Qty: 1 EACH | Refills: 1 | Status: SHIPPED | OUTPATIENT
Start: 2025-03-13

## 2025-03-13 RX ORDER — TIOTROPIUM BROMIDE AND OLODATEROL 3.124; 2.736 UG/1; UG/1
2 SPRAY, METERED RESPIRATORY (INHALATION) DAILY
Qty: 1 EACH | Refills: 0 | COMMUNITY
Start: 2025-03-13

## 2025-03-13 NOTE — PATIENT INSTRUCTIONS
1. Preventative health care  I reviewed age-appropriate preventive health screening exams as well as advanced directives and immunizations.  Patient declines all vaccines    2. Type 2 diabetes mellitus without complication, without long-term current use of insulin (HCC)  I reviewed goals for fasting and postmeal blood sugars.  I discussed the importance of home glucose monitoring.  Discussed importance of annual dilated retinal exams and glaucoma screening as well as routine foot exams and good fitting footwear.  Continue current meds and recheck A1c 6 months  - Microalb/Creat Ratio, Random Urine [E]; Future    3. Essential hypertension  I reviewed goals for blood pressure as well as conservative management of hypertension including sodium restriction, daily aerobic activity, alcohol moderation, smoking cessation, and maintaining ideal body weight.  I discussed the importance of home blood pressure monitoring, continue current meds    4. Hypercholesteremia  Reviewed goals for lipids.  Discussed results of recent labs.  Continue statin therapy, check labs annually    5. Atrial fibrillation and flutter (HCC)  Continue rate control and long-term anticoagulation, briefly discussed Watchman device    6. Current use of long term anticoagulation  Continue DOAC    7. Coronary artery disease involving native coronary artery of native heart without angina pectoris- [facility] 3/11/25, diffuse nonobstructive disease, less than 50%, normal EF  Reviewed results of recent cardiac catheterization and importance of good glycemic, blood pressure and lipid control as well as smoking cessation    8. Gastroesophageal reflux disease without esophagitis  Anti-reflux measures reviewed.  Avoid large meals. Allow at least 3 hrs between eating and laying down to facilitate gastric emptying.  Limit caffeine to 2 servings per day.  Avoid spicy or acidic foods and liquids.  Moderation of alcohol.  Avoid nsaids and aspirin.  May use Tylenol prn  pain  Reviewed results of recent EGD.  Discussed the importance of avoiding any medications within 1 to 2 hours of laying down to prevent pill ulcers.  May increase lansoprazole to twice daily during episodes of increased GERD,  Especially nocturnal    9. Erosive gastritis- EGD 1/7/25  See #8    10. Family history of prostate cancer  Continue annual surveillance    11. Screening for prostate cancer- due in May  Repeat PSA in May    12. Post-traumatic osteoarthritis of right knee  Continue current meds and monitoring.  Discussed potential adverse effects of long-term NSAIDs.  Monitor kidney function biannually    13. Essential tremor  Continue topiramate and gabapentin as well as neurology follow-up    14. Tubular adenoma of colon- X 1 on 1/7/25, repeat 7 yrs  Discussed results of colonoscopy, repeat scope in 7 years    15. Primary osteoarthritis of right shoulder  Patient likely to need replacement in the future    16. S/P shoulder surgery  Monitor clinically    17. Tobacco dependence  3 minutes spent discussing smoking cessation strategies including medications, risk, benefits, side effects, dissipated response, behavior modification, triggers etc., support given    18. Cervical spondylosis  Monitor clinically    19. Thoracic spinal stenosis  Monitor clinically    20. Vaccination not carried out because of patient refusal  Reviewed age-appropriate medications.  Patient declines all vaccines    21. FINK (dyspnea on exertion)  Discussed likely contribution of COPD.  Will order PFTs, patient started on Stiolto Respimat 2 and elations daily and albuterol 2 puffs up to every 4 hours as needed for cough, wheezing, chest tightness  Discussed importance of daily symptom limited walking regimen  - Complete PFT; Future

## 2025-03-13 NOTE — H&P
Gabriel Nunezbrian Doss. is a 65 year old male who presents for a complete physical exam. Here w/ his wife  HPI:   Pt voices concerns of progressive shortness of breath, unremarkable cardiac workup to date.    Wt Readings from Last 6 Encounters:   03/13/25 221 lb (100.2 kg)   03/06/25 222 lb (100.7 kg)   12/12/24 222 lb (100.7 kg)   11/12/24 222 lb 12.8 oz (101.1 kg)   10/31/24 222 lb 9.6 oz (101 kg)   09/05/24 217 lb (98.4 kg)     Body mass index is 34.1 kg/m².     Cholesterol, Total (mg/dL)   Date Value   02/03/2025 98   11/25/2023 110   11/19/2022 108     CHOLESTEROL, TOTAL (mg/dL)   Date Value   09/04/2015 157   03/03/2015 134   12/23/2014 221 (H)     HDL Cholesterol (mg/dL)   Date Value   02/03/2025 24 (L)   11/25/2023 31 (L)   11/19/2022 29 (L)     HDL CHOLESTEROL (mg/dL)   Date Value   09/04/2015 24 (L)   03/03/2015 22 (L)   12/23/2014 21 (L)     LDL Cholesterol (mg/dL)   Date Value   02/03/2025 56   11/25/2023 66   11/19/2022 65     LDL-CHOLESTEROL (mg/dL (calc))   Date Value   09/04/2015 115   03/03/2015 83   12/23/2014      Comment:        LDL cholesterol not calculated. Triglyceride levels  greater than 400 mg/dL invalidate calculated LDL results.        Desirable range <100 mg/dL for patients with CHD or  diabetes and <70 mg/dL for diabetic patients with  known heart disease.          AST (U/L)   Date Value   02/03/2025 41 (H)   05/17/2024 25   11/25/2023 19   01/23/2016 28   12/16/2015 25   09/04/2015 27     ALT (U/L)   Date Value   02/03/2025 97 (H)   05/17/2024 48   11/25/2023 43   01/23/2016 37   12/16/2015 39   09/04/2015 28      Current Outpatient Medications   Medication Sig Dispense Refill    rosuvastatin 10 MG Oral Tab Take 1 tablet (10 mg total) by mouth every evening. 90 tablet 3    diclofenac 50 MG Oral Tab EC Take 1 tablet (50 mg total) by mouth 2 (two) times daily. 180 tablet 1    TRAMADOL 50 MG Oral Tab TAKE 1 TABLET BY MOUTH 2 TIMES DAILY AS NEEDED FOR PAIN. 56 tablet 0    METFORMIN 500 MG  Oral Tab TAKE 1 TABLET BY MOUTH TWICE A DAY WITH FOOD 180 tablet 1    LANSOPRAZOLE 30 MG Oral Capsule Delayed Release TAKE 1 CAPSULE (30 MG TOTAL) BY MOUTH BEFORE BREAKFAST. 90 capsule 3    gabapentin 100 MG Oral Cap Take 1 capsule (100 mg total) by mouth in the morning and 1 capsule (100 mg total) before bedtime. 180 capsule 1    SOLIFENACIN SUCCINATE 5 MG Oral Tab TAKE 1 TABLET BY MOUTH ONCE DAILY. 90 tablet 3    topiramate 25 MG Oral Tab Take 3 tablets (75 mg total) by mouth 2 (two) times daily. 540 tablet 3    Multiple Vitamins-Minerals (EMERGEN-C IMMUNE OR) Take by mouth. Powder pack      dilTIAZem  MG Oral Capsule SR 24 Hr Take 1 capsule (240 mg total) by mouth daily. 90 capsule 3    rivaroxaban (XARELTO) 20 MG Oral Tab Take 1 tablet (20 mg total) by mouth daily. 90 tablet 3    Vitamin C 500 MG Oral Tab Take 1 tablet (500 mg total) by mouth in the morning and 1 tablet (500 mg total) before bedtime.      Glucose Blood (BLOOD GLUCOSE TEST) In Vitro Strip As directed---use to check blood glucose once daily 100 strip 3    Blood Glucose Monitoring Suppl Does not apply Device As directed---use to check blood glucose once daily 1 each 0    Lancets Does not apply Misc As directed---use to check blood glucose once daily 100 each 3     Allergies[1]     Past Medical History:    Abnormal nuclear stress test    Atrial fibrillation (HCC)    Esophageal reflux    Essential hypertension    Frequent use of laxatives    Clearlax daily    H/O Salmonella gastroenteritis    Hearing loss    Hearing aides    Hypercholesteremia    Lung nodule    LLL, stable on serial CT scans since 2012    Obesity    Osteoarthritis of right knee    Tobacco dependence    Type 2 diabetes mellitus (HCC)      Past Surgical History:   Procedure Laterality Date    Colonoscopy  02/04/2013    Colonoscopy  01/07/2025    Tubular adenoma x 1    Egd  01/07/2025    Erosive gastritis, negative biopsies    Hernia surgery Left     inguinal with mesh    Hernia  surgery      umbilical    Left heart cath,cutdown  2025    Ejection fraction 65%, normal mitral valve, moderate nonobstructive disease less than 50% stenosis    Left heart cath,cutdown  2025    Ejection fraction 60 to 65%, mildly elevated pulmonary artery pressures, diffuse nonobstructive plaquing, less than 50% stenosis    Nuc stress test, cardio (dm)  2025    PET perfusion pharmacologic nuclear stress test-abnormal    Other surgical history Left     KNEE SURGERY FOR CARTILAGE TEAR    Other surgical history Right     knee X 2- Arthroscopy    Other surgical history Right 2024    impingment and \"frozen shoulder\"    Tonsillectomy        Family History   Problem Relation Age of Onset    Cancer Father 70        PROSTATE CANCER    Heart Disorder Father         TIA    Prostate Cancer Father     Psychiatric Mother         DEMENTIA    Diabetes Mother             Heart Disorder Mother         CAD    Lipids Mother     Hypertension Mother     Lipids Brother     Diabetes Sister     Diabetes Brother     Hypertension Brother     No Known Problems Sister     No Known Problems Sister       Social History:  Social History     Socioeconomic History    Marital status:    Tobacco Use    Smoking status: Every Day     Current packs/day: 0.50     Average packs/day: 0.5 packs/day for 40.0 years (20.0 ttl pk-yrs)     Types: Cigarettes     Passive exposure: Never    Smokeless tobacco: Never    Tobacco comments:     Pt has been cutting back, total 50 pk yr   Vaping Use    Vaping status: Never Used   Substance and Sexual Activity    Alcohol use: No    Drug use: No   Other Topics Concern    Caffeine Concern Yes     Comment: 1 cup a day & 1 soda a week    Exercise Yes     Comment: active at work, 3x week PT    Seat Belt Yes    Special Diet No    Stress Concern No    Weight Concern Yes     Social Drivers of Health     Food Insecurity: No Food Insecurity (3/13/2025)    NCSS - Food Insecurity     Worried  About Running Out of Food in the Last Year: No     Ran Out of Food in the Last Year: No   Transportation Needs: No Transportation Needs (3/13/2025)    NCSS - Transportation     Lack of Transportation: No   Housing Stability: Not At Risk (3/13/2025)    NCSS - Housing/Utilities     Has Housing: Yes     Worried About Losing Housing: No     Unable to Get Utilities: No      Specialists: Dr Hutchins-Cardiology , Dr Harris-ortho, Dr Rush-neurology  Occ: runs [employer] ,  : +. Children: daughter.   Exercise: walking, active on the job.  Diet: generally healthy, low starches     REVIEW OF SYSTEMS:   GENERAL: generally feels well, denies fatigue, denies excessive daytime drowsiness, wt creeping a few # over the yr  EYES:denies blurred vision or double vision, glasses/ contacts: +, exam in Jan '25 @ [facility]  ENT: denies nasal congestion and PND ,no ST, + snoring but no reported periods of apnea, denies hearing deficits  LUNGS: + shortness of breath with exertion since retiring, getting worse, + morning cough, no wheezing , chest CT 12/16/23, neg CXR last June  CARDIOVASCULAR: denies chest pain on exertion, palpations, anginal equivalent symptoms, no claudication , no peripheral edema, not checking bp   GI: denies heartburn, diarrhea, or change in bowel habits,no constipation, less constipation w/ miralax, patient takes fiber supplement sporadically, +less heartburn last several weeks  : denies dysuria, hesitancy, +nocturia x 1 ,denies decreased urine stream, incontinence, erectile dysfunction, decreased libido  MUSCULOSKELETAL: denies back pain. + Chronic R>L knee pain and stiffness in hands-Diclofenac helpful, tramadol ~ 1/ weekly  Having pain right shoulder- still limited ROM a little better after surgery and therapy, significant cervical spondylosis , and severe thoracic stenosis @ T2-3- no symptoms  NEURO: denies headaches,  dizziness, numbness and weakness, +tremors-improved on Topiramate   PSYCHE:  denies depression or anxiety, +disrupted sleep-chronic  HEMATOLOGIC: denies unexplained bruising or bleeding  ENDOCRINE: denies heat or cold intolerance , no unexplained wt loss or gain, not checking bs  EXAM:   /64 (BP Location: Left arm, Patient Position: Sitting, Cuff Size: adult)   Pulse 88   Temp 97.6 °F (36.4 °C) (Tympanic)   Resp 22   Ht 5' 7.5\" (1.715 m)   Wt 221 lb (100.2 kg)   SpO2 95%   BMI 34.10 kg/m²   Body mass index is 34.1 kg/m².   GENERAL: well developed, well nourished,in no apparent distress  SKIN: No suspicious skin lesions, purple bruising right groin at catheterization site, no palpable hematoma  HEENT: EACs clear, TMs intact, nasal turbinates slightly congested, septum midline, no oral lesions, fair dental hygiene  EYES: PERRL, EOMI, early cataracts bilaterally  NECK: supple,no adenopathy,no bruits, no thyromegaly, symmetrical range of motion, negative Spurling sign  LUNGS: Distant breath sounds throughout, no wheezes, rhonchi, rales  CARDIO: Heart rate and rhythm are irregularly irregular without murmur, no S3, S4, 1+ pedal pulses bilaterally, no peripheral edema, palpable femoral pulses bilaterally without bruit  EXTREMITIES: no cyanosis, clubbing or edema, FROM of all joints tested  Right shoulder: Limited range of motion at the glenohumeral joint, palpable crepitance, atrophy of supraspinatus  right knee: Extension limited by approximately 5° compared to the left, flexion to 90°, hypertrophic changes   BILATERAL FOOT EXAM OF PLANTAR ASPECT: WITHOUT CALLUS, ULCERS OR GROSS DEFORMITIES, SENSATION INTACT TO MONOFILAMENT TESTING, 1+ DP / PT PULSES, NAILS W/ DYSTROPHIC CHANGES  NEURO: A &O X 3,cranial nerves are intact,motor and sensory are grossly intact, DTRs +2/4 UE/LE bilaterally except reduced right brachial radialis reflex, mild fine tremor in the hands  PSYCH: affect: blunt, slightly anxious, speech: clear and coherent, Insight: fair  ASSESSMENT AND PLAN:   Gabriel BRENNER  Hanny Kwon is a 65 year old male   Encounter Diagnoses   Name Primary?    Preventative health care Yes    Type 2 diabetes mellitus without complication, without long-term current use of insulin (HCC)     Essential hypertension     Hypercholesteremia     Atrial fibrillation and flutter (HCC)     Current use of long term anticoagulation     Coronary artery disease involving native coronary artery of native heart without angina pectoris- [facility] 3/11/25, diffuse nonobstructive disease, less than 50%, normal EF     Gastroesophageal reflux disease without esophagitis     Erosive gastritis- EGD 1/7/25     Family history of prostate cancer     Screening for prostate cancer- due in May     Post-traumatic osteoarthritis of right knee     Essential tremor     Tubular adenoma of colon- X 1 on 1/7/25, repeat 7 yrs     Primary osteoarthritis of right shoulder     S/P shoulder surgery     Tobacco dependence     Cervical spondylosis     Thoracic spinal stenosis     Vaccination not carried out because of patient refusal     FINK (dyspnea on exertion)      Orders Placed This Encounter   Procedures    Microalb/Creat Ratio, Random Urine [E]     Meds & Refills for this Visit:  Requested Prescriptions     Signed Prescriptions Disp Refills    Tiotropium Bromide-Olodaterol (STIOLTO RESPIMAT) 2.5-2.5 MCG/ACT Inhalation Aero Soln 1 each 0     Sig: Inhale 2 Inhalations into the lungs daily.    albuterol 108 (90 Base) MCG/ACT Inhalation Aero Soln 1 each 1     Sig: Inhale 2 puffs into the lungs every 4 (four) hours as needed for Wheezing or Shortness of Breath.     Imaging & Consults:  None  No follow-ups on file.  Patient Instructions   1. Preventative health care  I reviewed age-appropriate preventive health screening exams as well as advanced directives and immunizations.  Patient declines all vaccines    2. Type 2 diabetes mellitus without complication, without long-term current use of insulin (HCC)  I reviewed goals for fasting and postmeal  blood sugars.  I discussed the importance of home glucose monitoring.  Discussed importance of annual dilated retinal exams and glaucoma screening as well as routine foot exams and good fitting footwear.  Continue current meds and recheck A1c 6 months  - Microalb/Creat Ratio, Random Urine [E]; Future    3. Essential hypertension  I reviewed goals for blood pressure as well as conservative management of hypertension including sodium restriction, daily aerobic activity, alcohol moderation, smoking cessation, and maintaining ideal body weight.  I discussed the importance of home blood pressure monitoring, continue current meds    4. Hypercholesteremia  Reviewed goals for lipids.  Discussed results of recent labs.  Continue statin therapy, check labs annually    5. Atrial fibrillation and flutter (HCC)  Continue rate control and long-term anticoagulation, briefly discussed Watchman device    6. Current use of long term anticoagulation  Continue DOAC    7. Coronary artery disease involving native coronary artery of native heart without angina pectoris- [facility] 3/11/25, diffuse nonobstructive disease, less than 50%, normal EF  Reviewed results of recent cardiac catheterization and importance of good glycemic, blood pressure and lipid control as well as smoking cessation    8. Gastroesophageal reflux disease without esophagitis  Anti-reflux measures reviewed.  Avoid large meals. Allow at least 3 hrs between eating and laying down to facilitate gastric emptying.  Limit caffeine to 2 servings per day.  Avoid spicy or acidic foods and liquids.  Moderation of alcohol.  Avoid nsaids and aspirin.  May use Tylenol prn pain  Reviewed results of recent EGD.  Discussed the importance of avoiding any medications within 1 to 2 hours of laying down to prevent pill ulcers.  May increase lansoprazole to twice daily during episodes of increased GERD,  Especially nocturnal    9. Erosive gastritis- EGD 1/7/25  See #8    10. Family history of  prostate cancer  Continue annual surveillance    11. Screening for prostate cancer- due in May  Repeat PSA in May    12. Post-traumatic osteoarthritis of right knee  Continue current meds and monitoring.  Discussed potential adverse effects of long-term NSAIDs.  Monitor kidney function biannually    13. Essential tremor  Continue topiramate and gabapentin as well as neurology follow-up    14. Tubular adenoma of colon- X 1 on 1/7/25, repeat 7 yrs  Discussed results of colonoscopy, repeat scope in 7 years    15. Primary osteoarthritis of right shoulder  Patient likely to need replacement in the future    16. S/P shoulder surgery  Monitor clinically    17. Tobacco dependence  3 minutes spent discussing smoking cessation strategies including medications, risk, benefits, side effects, dissipated response, behavior modification, triggers etc., support given    18. Cervical spondylosis  Monitor clinically    19. Thoracic spinal stenosis  Monitor clinically    20. Vaccination not carried out because of patient refusal  Reviewed age-appropriate medications.  Patient declines all vaccines    21. FINK (dyspnea on exertion)  Discussed likely contribution of COPD.  Will order PFTs, patient started on Stiolto Respimat 2 and elations daily and albuterol 2 puffs up to every 4 hours as needed for cough, wheezing, chest tightness  Discussed importance of daily symptom limited walking regimen  - Complete PFT; Future    Carlton Sosa DO, FAAFP         [1]   Allergies  Allergen Reactions    Bee      difficulty breathing , throat swells       Aleve [Naproxen] OTHER (SEE COMMENTS)     DEPRESSION    Atorvastatin OTHER (SEE COMMENTS)     Leg cramps/ frecuent urinating    Lisinopril Coughing

## 2025-03-14 LAB
CREAT UR-SCNC: 98.4 MG/DL
MICROALBUMIN UR-MCNC: 0.4 MG/DL
MICROALBUMIN/CREAT 24H UR-RTO: 4.1 UG/MG (ref ?–30)

## 2025-03-14 PROCEDURE — 82570 ASSAY OF URINE CREATININE: CPT | Performed by: FAMILY MEDICINE

## 2025-03-14 PROCEDURE — 82043 UR ALBUMIN QUANTITATIVE: CPT | Performed by: FAMILY MEDICINE

## 2025-03-16 DIAGNOSIS — G25.0 ESSENTIAL TREMOR: ICD-10-CM

## 2025-03-17 ENCOUNTER — RT VISIT (OUTPATIENT)
Dept: RESPIRATORY THERAPY | Facility: HOSPITAL | Age: 65
End: 2025-03-17
Attending: FAMILY MEDICINE
Payer: COMMERCIAL

## 2025-03-17 DIAGNOSIS — R06.09 DOE (DYSPNEA ON EXERTION): ICD-10-CM

## 2025-03-17 PROCEDURE — 94010 BREATHING CAPACITY TEST: CPT

## 2025-03-17 PROCEDURE — 94729 DIFFUSING CAPACITY: CPT

## 2025-03-17 PROCEDURE — 94726 PLETHYSMOGRAPHY LUNG VOLUMES: CPT

## 2025-03-17 RX ORDER — GABAPENTIN 100 MG/1
100 CAPSULE ORAL 2 TIMES DAILY
Qty: 180 CAPSULE | Refills: 1 | Status: SHIPPED | OUTPATIENT
Start: 2025-03-17

## 2025-03-17 NOTE — TELEPHONE ENCOUNTER
PSR spoke  to both patient and spouse. They will call back tomorrow, Tuesday 3/18/25 to schedule appointment as they are in a store at the moment.

## 2025-03-17 NOTE — PROCEDURES
Pulmonary Function Test Report  Findings:  Prebronchodilator FEV1 is 2.02L, z-score -2.07.  Prebronchodilator FVC is 2.54L, z-score -2.60.                           FEV1/ FVC ratio is 79 %, z-score -0.24.   The flow-volume loop demonstrates a normal pattern.  The TLC is 5.03L, z-score -2.06. The residual volume 2.48L, z-score 0.49.   The diffusion capacity is 16.77, z-score -2.25 and -0.22 when corrected for alveolar volume.     Impression:  Mild decrease in FEV1 and FVC without obstruction  MVV is low which can be seen in neuromuscular disease vs poor effort  There is mild (z-score -1.65 to -2.5) restriction.  This can be seen in heart failure, fluid overload states, interstitial lung disease, thoracic spine/chest disorders, obesity as well as other clinical scenarios.  Further clinical correlation required.    RV/TLC ratio is elevated, which can be seen in obstructive ventilatory defects.     Diffusion capacity is mild (z-score -1.65 to -2.5).  DLCO improves to normal when considering alveolar volume. This may represent a normal finding but can be seen in emphysema, interstitial lung disease, pulmonary vascular disease (such as pulmonary hypertension) and anemia. If not already performed, would suggest further evaluation as determined clinically        Disclaimer: This PFT has been interpreted based on Z-score/LLN/ULN criteria as described in ATS guidelines 2022.   Inocente Redman MD  [facility] Pulmonary Medicine  Office: [phone]

## 2025-03-17 NOTE — TELEPHONE ENCOUNTER
Medication: Gabapentin 100 mg     Date of last refill:09/16/20245# 180/1  [place]     Last office visit: 9/5/24   Due back to clinic per last office note:  6 months  Date next office visit scheduled:                Future Appointments   Date Time Provider Department Center   11/8/2024  8:30 AM REF [facility] REF [facility] [place]   11/12/2024  9:00 AM Carlton Sosa, DO [facility] [place]            Last OV note recommendation:     Per Faviola:    Essential Tremor- improved with the gabapentin 100mg bid. Discussed that there is room to adjust the gabapentin if needed. Continue the Topamax 75mg bid.   Patient also made aware of other treatment options for essential tremor including DBS, MR Guided Focused Ultrasound, and Nicole Trio Device. Patient was also given information on essential tremor

## 2025-03-18 ENCOUNTER — TELEPHONE (OUTPATIENT)
Dept: FAMILY MEDICINE CLINIC | Facility: CLINIC | Age: 65
End: 2025-03-18

## 2025-03-18 NOTE — TELEPHONE ENCOUNTER
Needs you to explain the test result to him.  He saw it on his my chart but does not understand it

## 2025-03-28 RX ORDER — TIOTROPIUM BROMIDE AND OLODATEROL 3.124; 2.736 UG/1; UG/1
2 SPRAY, METERED RESPIRATORY (INHALATION) DAILY
Qty: 1 EACH | Refills: 5 | Status: SHIPPED | OUTPATIENT
Start: 2025-03-28

## 2025-03-28 NOTE — TELEPHONE ENCOUNTER
Tiotropium Bromide-Olodaterol (STIOLTO RESPIMAT) 2.5-2.5 MCG/ACT Inhalation Aero Soln   Wife asking if this can be called to [facility] [place]/AS SOON AS POSSIBLE they are heading that way now for another appt.

## 2025-03-28 NOTE — TELEPHONE ENCOUNTER
Last office visit: 3/13/25  Last refill: Sample given 3/13/25  Future Appointments   Date Time Provider Department Center   5/19/2025 10:00 AM Faviola Charlton PA [facility] [place]

## 2025-04-07 NOTE — TELEPHONE ENCOUNTER
No protocol    OV 03/13/25  LABS 03/11/25    REFILL 12/04/23 #90 +3 RF    Future Appointments   Date Time Provider Department Center   5/19/2025 10:00 AM Faviola Charlton PA [facility]     Due to last refill - calling patient to verify if medication is being taken - per patient - he is taking this every day.

## 2025-04-10 RX ORDER — DILTIAZEM HYDROCHLORIDE 240 MG/1
240 CAPSULE, COATED, EXTENDED RELEASE ORAL DAILY
Qty: 90 CAPSULE | Refills: 3 | Status: SHIPPED | OUTPATIENT
Start: 2025-04-10

## 2025-04-10 NOTE — TELEPHONE ENCOUNTER
Last refill: 04/25/24  Qty: 90  W/ 3 refills  Last ov: 03/13/25    Requested Prescriptions     Pending Prescriptions Disp Refills    DILTIAZEM  MG Oral Capsule SR 24 Hr [Pharmacy Med Name: DILTIAZEM 24H ER(CD) 240 MG CP] 90 capsule 3     Sig: TAKE 1 CAPSULE BY MOUTH EVERY DAY     Future Appointments   Date Time Provider Department Center   5/19/2025 10:00 AM Faviola Charlton PA [facility]       Hypertension Medications Protocol [id number]/09/2025 04:40 PM   Protocol Details CMP or BMP in past 12 months    Last BP reading less than 140/90    In person appointment or virtual visit in the past 12 mos or appointment in next 3 mos    EGFRCR or GFRNAA > 50    Medication is active on med list

## 2025-04-19 NOTE — TELEPHONE ENCOUNTER
Last refill Metformin 500 mg was 12/21/24 #180 +1rf    Refill requested too soon - patient is not due until 06/21/25.     Request denied at this time.

## 2025-05-19 ENCOUNTER — OFFICE VISIT (OUTPATIENT)
Dept: NEUROLOGY | Facility: CLINIC | Age: 65
End: 2025-05-19
Payer: COMMERCIAL

## 2025-05-19 VITALS
DIASTOLIC BLOOD PRESSURE: 83 MMHG | HEART RATE: 82 BPM | SYSTOLIC BLOOD PRESSURE: 129 MMHG | RESPIRATION RATE: 16 BRPM | WEIGHT: 221 LBS | HEIGHT: 67.5 IN | BODY MASS INDEX: 34.28 KG/M2

## 2025-05-19 DIAGNOSIS — G25.0 ESSENTIAL TREMOR: Primary | ICD-10-CM

## 2025-05-19 PROCEDURE — 99214 OFFICE O/P EST MOD 30 MIN: CPT | Performed by: PHYSICIAN ASSISTANT

## 2025-05-19 PROCEDURE — 3074F SYST BP LT 130 MM HG: CPT | Performed by: PHYSICIAN ASSISTANT

## 2025-05-19 PROCEDURE — 3079F DIAST BP 80-89 MM HG: CPT | Performed by: PHYSICIAN ASSISTANT

## 2025-05-19 PROCEDURE — 3008F BODY MASS INDEX DOCD: CPT | Performed by: PHYSICIAN ASSISTANT

## 2025-05-19 RX ORDER — TOPIRAMATE 100 MG/1
100 TABLET, FILM COATED ORAL 2 TIMES DAILY
Qty: 60 TABLET | Refills: 0 | Status: SHIPPED | OUTPATIENT
Start: 2025-05-19

## 2025-05-19 NOTE — PROGRESS NOTES
NEUROLOGY  [facility]     Previous visit and existing record notes reviewed in preparation for the face to face visit.  Relevant labs and studies reviewed and will be noted in relevant areas of this record.    Gabriel Gamino Jr.  1/16/1960  Primary Care Provider:  Carlton Sosa DO    5/19/2025  65 year old male,      was last seen on: Patient was last seen 9/5/24 and at that his tremors were improved with adding gabapentin to the topamax    Right Handed    Seen for/plans last visit:  Essential Tremors      Accompanied visit: Yes- Wife     Present condition:    Patient is a 64 year old male with past medical hx significant for htn,hyperlipidemia, type II diabetes and atrial fibrillation. Here to follow-up for essential tremors.     He feels that his tremors have been worse since his last visit  The right hand tremor is worse then the left  He finds that when carrying a cup he will spill it at ties  He finds it difficult to work with tools  He does feel that he notices a difference in the tremor severity when he is having right rotator cuff pain the tremor seems to be increased. He finds that with pain control the tremor improves  He does smoke cigarettes. He has been cutting back  He has not had alcohol in many years, stopped after finding out about atrial fibrillation/heart issues  He does drink caffeine beverages and has chocolate  He has previously tried primidone and was unable to tolerate the side effects      Mother had blephrospasm      Review of Systems:  Review of Systems:  Denies systemic symptoms     No CP or SOB.  No GI or  symptoms. Relevant Neuro as noted above.    Past Medical History[1]      Medications:    Medications - Current[2]  PRN: PRN Medications[3]    Allergies:  Allergies[4]       EXAM:  /83 (BP Location: Left arm, Patient Position: Sitting, Cuff Size: large)   Pulse 82   Resp 16   Ht 67.5\"   Wt 221 lb (100.2 kg)   BMI 34.10 kg/m²   Looks stated  age  General Exam:  HENT:  pink conjunctiva anicteric sclerae  Neck no adenopathy, thyroid normal  Heart and Lungs:  normal  Extremities: no cyanosis, skin changes    NEURO  NAD,A&Ox3  EOMI  CN II-XII intact  Strength 5/5 all extremities  DTRs symmetric  Postural tremors R>L  Tremors noted on archimedes R>L  FTN intact bilaterally  No drift on exam  No resting tremor seen  No cogwheel or lead pipe rigidity  No shuffling or apractic gait       Problem/s Identified this visit:   1. Essential tremor          Discussion plus Diagnostics & Treatment Orders:    Essential Tremor- Can increase the Topamax to 100mg bid. Continue the Gabapentin 100mg bid. Discussed other options including DBS, Ultrasound guided Tx, and Nicole Trio. He is not interested in any invasive treatment at this time. He will check with insurance regarding the nicole trio device. Discussed smoking cessation and reducing caffeine use.     Will discuss with cardiologist regarding use of propranolol    (X) Discussed potential side effects of any treatment relevant to above.  Includes explanation of tests as necessary.    Return in about 4 months (around 9/19/2025).      Patient understands that if needed, based on condition and or test results, follow up will be readjusted    Faviola Charlton PA-C  [facility]  5/19/2025, Time completed 10:09 AM                 [1]   Past Medical History:   Abnormal nuclear stress test    Atrial fibrillation (HCC)    Esophageal reflux    Essential hypertension    Frequent use of laxatives    Clearlax daily    H/O Salmonella gastroenteritis    Hearing loss    Hearing aides    Hypercholesteremia    Lung nodule    LLL, stable on serial CT scans since 2012    Obesity    Osteoarthritis of right knee    Tobacco dependence    Type 2 diabetes mellitus (HCC)   [2]   Current Outpatient Medications:     topiramate (TOPAMAX) 100 MG Oral Tab, Take 1 tablet (100 mg total) by mouth 2 (two) times daily., Disp: 60 tablet,  Rfl: 0    DILTIAZEM  MG Oral Capsule SR 24 Hr, TAKE 1 CAPSULE BY MOUTH EVERY DAY, Disp: 90 capsule, Rfl: 3    rivaroxaban (XARELTO) 20 MG Oral Tab, Take 1 tablet (20 mg total) by mouth daily., Disp: 90 tablet, Rfl: 3    Tiotropium Bromide-Olodaterol (STIOLTO RESPIMAT) 2.5-2.5 MCG/ACT Inhalation Aero Soln, Inhale 2 Inhalations into the lungs daily., Disp: 1 each, Rfl: 5    GABAPENTIN 100 MG Oral Cap, TAKE 1 CAPSULE BY MOUTH IN THE MORNING AND BEFORE BEDTIME, Disp: 180 capsule, Rfl: 1    albuterol 108 (90 Base) MCG/ACT Inhalation Aero Soln, Inhale 2 puffs into the lungs every 4 (four) hours as needed for Wheezing or Shortness of Breath., Disp: 1 each, Rfl: 1    rosuvastatin 10 MG Oral Tab, Take 1 tablet (10 mg total) by mouth every evening., Disp: 90 tablet, Rfl: 3    diclofenac 50 MG Oral Tab EC, Take 1 tablet (50 mg total) by mouth 2 (two) times daily., Disp: 180 tablet, Rfl: 1    TRAMADOL 50 MG Oral Tab, TAKE 1 TABLET BY MOUTH 2 TIMES DAILY AS NEEDED FOR PAIN., Disp: 56 tablet, Rfl: 0    METFORMIN 500 MG Oral Tab, TAKE 1 TABLET BY MOUTH TWICE A DAY WITH FOOD, Disp: 180 tablet, Rfl: 1    LANSOPRAZOLE 30 MG Oral Capsule Delayed Release, TAKE 1 CAPSULE (30 MG TOTAL) BY MOUTH BEFORE BREAKFAST., Disp: 90 capsule, Rfl: 3    SOLIFENACIN SUCCINATE 5 MG Oral Tab, TAKE 1 TABLET BY MOUTH ONCE DAILY., Disp: 90 tablet, Rfl: 3    Glucose Blood (BLOOD GLUCOSE TEST) In Vitro Strip, As directed---use to check blood glucose once daily, Disp: 100 strip, Rfl: 3    Blood Glucose Monitoring Suppl Does not apply Device, As directed---use to check blood glucose once daily, Disp: 1 each, Rfl: 0    Lancets Does not apply Misc, As directed---use to check blood glucose once daily, Disp: 100 each, Rfl: 3    Multiple Vitamins-Minerals (EMERGEN-C IMMUNE OR), Take by mouth. Powder pack, Disp: , Rfl:     Vitamin C 500 MG Oral Tab, Take 1 tablet (500 mg total) by mouth in the morning and 1 tablet (500 mg total) before bedtime., Disp: , Rfl:    [3] [4]   Allergies  Allergen Reactions    Bee      difficulty breathing , throat swells       Aleve [Naproxen] OTHER (SEE COMMENTS)     DEPRESSION    Atorvastatin OTHER (SEE COMMENTS)     Leg cramps/ frecuent urinating    Lisinopril Coughing

## 2025-05-19 NOTE — PATIENT INSTRUCTIONS
Refill policies:    Allow 2-3 business days for refills; controlled substances may take longer.  Contact your pharmacy at least 5 days prior to running out of medication and have them send an electronic request or submit request through the “request refill” option in your [employer] account.  Refills are not addressed on weekends; covering physicians do not authorize routine medications on weekends.  No narcotics or controlled substances are refilled after noon on Fridays or by on call physicians.  By law, narcotics must be electronically prescribed.  A 30 day supply with no refills is the maximum allowed.  If your prescription is due for a refill, you may be due for a follow up appointment.  To best provide you care, patients receiving routine medications need to be seen at least once a year.  Patients receiving narcotic/controlled substance medications need to be seen at least once every 3 months.  In the event that your preferred pharmacy does not have the requested medication in stock (e.g. Backordered), it is your responsibility to find another pharmacy that has the requested medication available.  We will gladly send a new prescription to that pharmacy at your request.    Scheduling Tests:    If your physician has ordered radiology tests such as MRI or CT scans, please contact Central Scheduling at [phone] right away to schedule the test.  Once scheduled, the [facility] Centralized Referral Team will work with your insurance carrier to obtain pre-certification or prior authorization.  Depending on your insurance carrier, approval may take 3-10 days.  It is highly recommended patients assure they have received an authorization before having a test performed.  If test is done without insurance authorization, patient may be responsible for the entire amount billed.      Precertification and Prior Authorizations:  If your physician has recommended that you have a procedure or additional testing performed the [facility]  Centralized Referral Team will contact your insurance carrier to obtain pre-certification or prior authorization.    You are strongly encouraged to contact your insurance carrier to verify that your procedure/test has been approved and is a COVERED benefit.  Although the [facility] Centralized Referral Team does its due diligence, the insurance carrier gives the disclaimer that \"Although the procedure is authorized, this does not guarantee payment.\"    Ultimately the patient is responsible for payment.   Thank you for your understanding in this matter.  Paperwork Completion:  If you require FMLA or disability paperwork for your recovery, please make sure to either drop it off or have it faxed to our office at [phone]. Be sure the form has your name and date of birth on it.  The form will be faxed to our Forms Department and they will complete it for you.  There is a 25$ fee for all forms that need to be filled out.  Please be aware there is a 10-14 day turnaround time.  You will need to sign a release of information (LANDON) form if your paperwork does not come with one.  You may call the Forms Department with any questions at [phone].  Their fax number is [phone].

## 2025-05-30 ENCOUNTER — TELEPHONE (OUTPATIENT)
Dept: NEUROLOGY | Facility: CLINIC | Age: 65
End: 2025-05-30

## 2025-05-30 NOTE — TELEPHONE ENCOUNTER
Patient's wife calling, advised that he decreased topiramate back to 75MG because he was feeling drowsy and nauseous while on 100MG. Advised that essential tremors did improve with the higher dose but the side effects were unpleasant enough that he felt the need to reduce dosage.     Patient would like to know Faviola/Dr. Rush's opinion about any medication changes/adjustments to dosage.    Please advise.

## 2025-05-30 NOTE — TELEPHONE ENCOUNTER
Patient (with wife on speaker) states he took five days of the increased dose of TOPIRAMATE (from 75 mg BID to 100 mg BID) with 40% improvement of tremors, but he could not tolerate the drowsiness that came with the increased dose.  Drowsiness did not seem to diminish with time.   Patient suggests that he believes the 25 mg TOPIRAMATE tablets seem less strong and would like to try taking #4-25 mg tablets in place of the 100 mg tablet to see if he tolerates this better.  States he just refilled he 25 mg last week and has plenty on hand.    Advised patient to follow up with condition update in one week to this office.  Verbalized understanding.

## 2025-06-06 ENCOUNTER — APPOINTMENT (OUTPATIENT)
Dept: GENERAL RADIOLOGY | Age: 65
End: 2025-06-06
Attending: NURSE PRACTITIONER
Payer: COMMERCIAL

## 2025-06-06 ENCOUNTER — HOSPITAL ENCOUNTER (OUTPATIENT)
Age: 65
Discharge: HOME | End: 2025-06-06
Payer: COMMERCIAL

## 2025-06-06 VITALS
BODY MASS INDEX: 32.88 KG/M2 | DIASTOLIC BLOOD PRESSURE: 90 MMHG | HEART RATE: 87 BPM | SYSTOLIC BLOOD PRESSURE: 129 MMHG | OXYGEN SATURATION: 96 % | TEMPERATURE: 98 F | HEIGHT: 69 IN | RESPIRATION RATE: 22 BRPM | WEIGHT: 222 LBS

## 2025-06-06 DIAGNOSIS — R91.1 LUNG NODULE: ICD-10-CM

## 2025-06-06 DIAGNOSIS — J18.9 COMMUNITY ACQUIRED PNEUMONIA OF RIGHT LUNG, UNSPECIFIED PART OF LUNG: Primary | ICD-10-CM

## 2025-06-06 PROCEDURE — 71046 X-RAY EXAM CHEST 2 VIEWS: CPT | Performed by: NURSE PRACTITIONER

## 2025-06-06 PROCEDURE — 99214 OFFICE O/P EST MOD 30 MIN: CPT | Performed by: NURSE PRACTITIONER

## 2025-06-06 RX ORDER — BENZONATATE 100 MG/1
100 CAPSULE ORAL 3 TIMES DAILY PRN
Qty: 30 CAPSULE | Refills: 0 | Status: SHIPPED | OUTPATIENT
Start: 2025-06-06 | End: 2025-06-13

## 2025-06-06 RX ORDER — PREDNISONE 20 MG/1
40 TABLET ORAL DAILY
Qty: 10 TABLET | Refills: 0 | Status: SHIPPED | OUTPATIENT
Start: 2025-06-06 | End: 2025-06-11

## 2025-06-06 RX ORDER — AZITHROMYCIN 250 MG/1
TABLET, FILM COATED ORAL
Qty: 6 TABLET | Refills: 0 | Status: SHIPPED | OUTPATIENT
Start: 2025-06-06 | End: 2025-06-11

## 2025-06-06 NOTE — DISCHARGE INSTRUCTIONS
Please take dual antibiotic therapy as prescribed.  Tessalon Perles for coughing.  Continue the use of inhalers and emergency inhalers as prescribed.  Prednisone daily.  Close follow-up with your primary.  ER if worse.

## 2025-06-06 NOTE — ED PROVIDER NOTES
Patient Seen in: [facility]        History  Chief Complaint   Patient presents with    Cough/URI    Ear Problem Pain     left     Stated Complaint: Cough; Wheezing; L Ear Pain; Congestion    Subjective:   This is a 65-year-old male with below stated medical history.  Presents to immediate care for URI symptoms.  Reports coughing and wheezing.  Symptoms started Sunday.  He also reports left ear pain.  No chest pain, shortness of breath, or swelling in the lower legs.  No fevers.  Taking multiple over-the-counter products with little relief.    The history is provided by the patient.                     Objective:     Past Medical History:    Abnormal nuclear stress test    Atrial fibrillation (HCC)    Esophageal reflux    Essential hypertension    Frequent use of laxatives    Clearlax daily    H/O Salmonella gastroenteritis    Hearing loss    Hearing aides    Hypercholesteremia    Lung nodule    LLL, stable on serial CT scans since 2012    Obesity    Osteoarthritis of right knee    Tobacco dependence    Type 2 diabetes mellitus (HCC)              Past Surgical History:   Procedure Laterality Date    Colonoscopy  02/04/2013    Colonoscopy  01/07/2025    Tubular adenoma x 1    Egd  01/07/2025    Erosive gastritis, negative biopsies    Hernia surgery Left     inguinal with mesh    Hernia surgery      umbilical    Left heart cath,cutdown  03/11/2025    Ejection fraction 65%, normal mitral valve, moderate nonobstructive disease less than 50% stenosis    Left heart cath,cutdown  03/11/2025    Ejection fraction 60 to 65%, mildly elevated pulmonary artery pressures, diffuse nonobstructive plaquing, less than 50% stenosis    Nuc stress test, cardio (dmg)  02/14/2025    PET perfusion pharmacologic nuclear stress test-abnormal    Other surgical history Left     KNEE SURGERY FOR CARTILAGE TEAR    Other surgical history Right     knee X 2- Arthroscopy    Other surgical history Right 06/07/2024    impingment and  \"frozen shoulder\"    Tonsillectomy                  Social History     Socioeconomic History    Marital status:    Tobacco Use    Smoking status: Every Day     Current packs/day: 0.50     Average packs/day: 0.5 packs/day for 40.0 years (20.0 ttl pk-yrs)     Types: Cigarettes     Passive exposure: Never    Smokeless tobacco: Never    Tobacco comments:     Pt has been cutting back over 2 weeks ago, total 50 pk yr   Vaping Use    Vaping status: Never Used   Substance and Sexual Activity    Alcohol use: No    Drug use: No   Other Topics Concern    Caffeine Concern Yes     Comment: 1.5 cup a day & 1 soda a week    Exercise No    Seat Belt Yes    Special Diet No    Stress Concern No    Weight Concern Yes     Social Drivers of Health     Food Insecurity: No Food Insecurity (3/13/2025)    NCSS - Food Insecurity     Worried About Running Out of Food in the Last Year: No     Ran Out of Food in the Last Year: No   Transportation Needs: No Transportation Needs (3/13/2025)    NCSS - Transportation     Lack of Transportation: No   Housing Stability: Not At Risk (3/13/2025)    NCSS - Housing/Utilities     Has Housing: Yes     Worried About Losing Housing: No     Unable to Get Utilities: No              Review of Systems   Constitutional:  Negative for chills and fever.   HENT:  Positive for congestion and ear pain. Negative for sore throat.    Respiratory:  Positive for cough and wheezing. Negative for shortness of breath.    Cardiovascular:  Negative for chest pain, palpitations and leg swelling.   Gastrointestinal:  Negative for abdominal pain, constipation, diarrhea, nausea and vomiting.   Genitourinary:  Negative for dysuria.   Musculoskeletal:  Negative for back pain, neck pain and neck stiffness.   Skin:  Negative for rash.   Neurological:  Negative for headaches.       Positive for stated complaint: Cough; Wheezing; L Ear Pain; Congestion  Other systems are as noted in HPI.  Constitutional and vital signs reviewed.       All other systems reviewed and negative except as noted above.                  Physical Exam    ED Triage Vitals [06/06/25 1328]   /90   Pulse 103   Resp 22   Temp 97.9 °F (36.6 °C)   Temp src Oral   SpO2 95 %   O2 Device None (Room air)       Current Vitals:   Vital Signs  BP: 129/90  Pulse: 87  Resp: 22  Temp: 97.9 °F (36.6 °C)  Temp src: Oral    Oxygen Therapy  SpO2: 96 %  O2 Device: None (Room air)            Physical Exam  Vitals and nursing note reviewed.   Constitutional:       General: He is not in acute distress.     Appearance: Normal appearance. He is not ill-appearing, toxic-appearing or diaphoretic.   HENT:      Head: Normocephalic and atraumatic.      Right Ear: External ear normal. A middle ear effusion is present. Tympanic membrane is not injected, scarred, perforated, erythematous, retracted or bulging.      Left Ear: External ear normal. A middle ear effusion is present. Tympanic membrane is not injected, scarred, perforated, erythematous, retracted or bulging.      Nose: Congestion and rhinorrhea present.      Mouth/Throat:      Mouth: Mucous membranes are moist.      Pharynx: Oropharynx is clear. No oropharyngeal exudate or posterior oropharyngeal erythema.   Eyes:      General:         Right eye: No discharge.         Left eye: No discharge.      Extraocular Movements: Extraocular movements intact.      Conjunctiva/sclera: Conjunctivae normal.   Cardiovascular:      Rate and Rhythm: Normal rate.      Heart sounds: Normal heart sounds. No murmur heard.  Pulmonary:      Effort: Pulmonary effort is normal. No respiratory distress.      Breath sounds: Normal breath sounds. No stridor. No wheezing, rhonchi or rales.   Musculoskeletal:      Cervical back: Neck supple.      Right lower leg: No edema.      Left lower leg: No edema.   Skin:     General: Skin is warm and dry.      Capillary Refill: Capillary refill takes less than 2 seconds.      Findings: No rash.   Neurological:      Mental  Status: He is alert and oriented to person, place, and time.   Psychiatric:         Mood and Affect: Mood normal.         Behavior: Behavior normal.                 ED Course  Labs Reviewed - No data to display       Chest x-ray                  MDM     Vital signs stable. Patient is well-appearing and nontoxic looking. Presents to immediate care for upper respiratory symptoms.    Differential diagnosis include but not limited to COVID, viral sinusitis, influenza, COPD exacerbation, other viral URI, pneumonia, less likely CHF    Patient refused viral testing.     Lung sounds clear bilaterally. No respiratory distress or hypoxia.  Chest x-ray films interpreted and reviewed myself.  Results show right basilar opacity possibly representing atelectasis versus infiltrate.  There is also a stable 9 mm nodule opacity within the left lung.       Clinical impression is pneumonia.    Patient is maintaining oxygen saturation over 95% on room air.  Believe he is appropriate for outpatient ABT.    Patient and family aware of the chronic nodule on the left lung.    DC home.  Rx given for dual antibiotic therapy, short steroid burst, and Tessalon Perles.  Continue use of inhalers.  Tylenol for pain or fever. The importance of close follow-up with primary provider in 1 week discussed. Reasons to seek emergent treatment reinforced. Patient verbalized understanding, and agreed with plan of care. All questions answered.          Medical Decision Making      Disposition and Plan     Clinical Impression:  1. Community acquired pneumonia of right lung, unspecified part of lung    2. Lung nodule         Disposition:  Discharge  6/6/2025  2:01 pm    Follow-up:  Carlton Sosa, DO  [address] [facility] [id number]  [phone]    In 1 week            Medications Prescribed:  Current Discharge Medication List        START taking these medications    Details   predniSONE 20 MG Oral Tab Take 2 tablets (40 mg total) by mouth daily  for 5 days.  Qty: 10 tablet, Refills: 0      azithromycin (ZITHROMAX Z-ALEXEY) 250 MG Oral Tab 500 mg once followed by 250 mg daily x 4 days  Qty: 6 tablet, Refills: 0      amoxicillin clavulanate 875-125 MG Oral Tab Take 1 tablet by mouth 2 (two) times daily for 7 days.  Qty: 14 tablet, Refills: 0      benzonatate 100 MG Oral Cap Take 1 capsule (100 mg total) by mouth 3 (three) times daily as needed for cough.  Qty: 30 capsule, Refills: 0                   Supplementary Documentation:

## 2025-06-09 DIAGNOSIS — G89.18 POST-OP PAIN: ICD-10-CM

## 2025-06-09 DIAGNOSIS — M17.31 POST-TRAUMATIC OSTEOARTHRITIS OF RIGHT KNEE: ICD-10-CM

## 2025-06-09 RX ORDER — TRAMADOL HYDROCHLORIDE 50 MG/1
50 TABLET ORAL 2 TIMES DAILY PRN
Qty: 56 TABLET | Refills: 0 | Status: SHIPPED | OUTPATIENT
Start: 2025-06-09

## 2025-06-09 NOTE — TELEPHONE ENCOUNTER
TRAMADOL 50 MG Oral Tab     Controlled Substance Medication Suyqpt9906/09/2025 12:46 PM    This medication is a controlled substance - forward to provider to refill    Medication is active on med list      Last office visit:  PX: 3/13/25  Future Appointments   Date Time Provider Department Center   6/13/2025  9:15 AM Carlton Sosa,  [facility] [place]   8/14/2025 10:00 AM Faviola Charlton PA [facility] [place]   9/8/2025  8:00 AM REF [facility]   Last filled: 2/24/25 #56 tab   Last labs: 2/3/25 A1C, CBC, CMP, lipid 3/14/25 microalb/creat urine  Last BP:   BP Readings from Last 3 Encounters:   06/06/25 129/90   05/19/25 129/83   03/13/25 122/64

## 2025-06-13 ENCOUNTER — OFFICE VISIT (OUTPATIENT)
Dept: FAMILY MEDICINE CLINIC | Facility: CLINIC | Age: 65
End: 2025-06-13
Payer: COMMERCIAL

## 2025-06-13 VITALS
TEMPERATURE: 98 F | BODY MASS INDEX: 34 KG/M2 | OXYGEN SATURATION: 96 % | SYSTOLIC BLOOD PRESSURE: 112 MMHG | DIASTOLIC BLOOD PRESSURE: 74 MMHG | WEIGHT: 228 LBS | RESPIRATION RATE: 20 BRPM | HEART RATE: 95 BPM

## 2025-06-13 DIAGNOSIS — I48.91 ATRIAL FIBRILLATION AND FLUTTER (HCC): ICD-10-CM

## 2025-06-13 DIAGNOSIS — J18.9 PNEUMONIA OF RIGHT LOWER LOBE DUE TO INFECTIOUS ORGANISM: Primary | ICD-10-CM

## 2025-06-13 DIAGNOSIS — E11.9 TYPE 2 DIABETES MELLITUS WITHOUT COMPLICATION, WITHOUT LONG-TERM CURRENT USE OF INSULIN (HCC): ICD-10-CM

## 2025-06-13 DIAGNOSIS — E78.00 HYPERCHOLESTEREMIA: ICD-10-CM

## 2025-06-13 DIAGNOSIS — F17.200 TOBACCO DEPENDENCE: ICD-10-CM

## 2025-06-13 DIAGNOSIS — I48.92 ATRIAL FIBRILLATION AND FLUTTER (HCC): ICD-10-CM

## 2025-06-13 DIAGNOSIS — I10 ESSENTIAL HYPERTENSION: ICD-10-CM

## 2025-06-13 DIAGNOSIS — R93.89 ABNORMAL CXR: ICD-10-CM

## 2025-06-13 PROCEDURE — 3074F SYST BP LT 130 MM HG: CPT | Performed by: FAMILY MEDICINE

## 2025-06-13 PROCEDURE — 3078F DIAST BP <80 MM HG: CPT | Performed by: FAMILY MEDICINE

## 2025-06-13 PROCEDURE — 3061F NEG MICROALBUMINURIA REV: CPT | Performed by: FAMILY MEDICINE

## 2025-06-13 PROCEDURE — 99214 OFFICE O/P EST MOD 30 MIN: CPT | Performed by: FAMILY MEDICINE

## 2025-06-13 RX ORDER — BENZONATATE 100 MG/1
100 CAPSULE ORAL 3 TIMES DAILY PRN
Qty: 30 CAPSULE | Refills: 0 | Status: SHIPPED | OUTPATIENT
Start: 2025-06-13 | End: 2025-07-13

## 2025-06-13 RX ORDER — TOPIRAMATE 25 MG/1
75 TABLET, FILM COATED ORAL 2 TIMES DAILY
COMMUNITY
Start: 2025-05-23

## 2025-06-13 NOTE — PATIENT INSTRUCTIONS
Urgent care records, I reviewed imaging with comparison to previous.  I discussed new findings of elevated right hemidiaphragm, likely due to pneumonia.  However, given the patient's long smoking history and continued tobacco dependence, I have asked him to repeat a chest x-ray in 1 to 2 weeks  Patient was counseled on smoking cessation strategies as well as medication options including risk, benefits, side effects, anticipated response  Discussed the importance of home blood pressure and blood sugar monitoring  Patient be due for follow-up labs the end of August or beginning of September  Recommend daily symptom limited walking regimen

## 2025-06-13 NOTE — PROGRESS NOTES
Gabriel Gamino Jr. is a 65 year old male.  Chief Complaint   Patient presents with    Follow - Up    Pneumonia     Here with his wife  HPI:   Patient seen in urgent care on 6/6 with complaints of cough.  Chest x-ray showed a right basilar opacity with elevation of the right hemidiaphragm which is a new finding compared to x-ray 1 year ago.  Patient was started on Augmentin, Zithromax and prednisone, finished meds yesterday  Presented w/ cough and sob, looked gray x 5 days, fever, diaphoretic  currently breathing better, still coughing, productive in a.m., still smoking  Current Medications[1]   Past Medical History[2]   Social History:  Short Social Hx on File[3]     REVIEW OF SYSTEMS:   GENERAL HEALTH: feels well otherwise,  appetite ok, no fever, tires easily  SKIN: denies any unusual skin lesions or rashes  ENT: denies nasal congestion, pnd, sore throat, ear pain or pressure, decreased hearing  RESPIRATORY: Less shortness of breath with exertion,+cough, wheezing  CARDIOVASCULAR: denies chest pain on exertion, edema, pt not checking bp  GI: denies abdominal pain and denies heartburn  NEURO: denies headaches, tremors are worse  PSYCH: denies feeling stressed or anxious  Pt not checking bs  EXAM:   /74 (BP Location: Right arm, Patient Position: Sitting, Cuff Size: adult)   Pulse 95   Temp 98.4 °F (36.9 °C) (Temporal)   Resp 20   Wt 228 lb (103.4 kg)   SpO2 96%   BMI 33.67 kg/m²   GENERAL: well developed, well nourished,in no apparent distress, well hydrated  SKIN: no rashes,no suspicious lesions  ENT: TMs: intact, good mobility, Nose: turbinates clear, no dc, Throat: no erythema, pnd, or lesions  NECK: supple,no adenopathy,no bruits, no thyromegaly  LUNGS: Inspiratory crackles right base with decreased breath sounds, no egophony  CARDIO: Rhythm are irregularly irregular without murmur, peripheral pulses intact, no edema  GI: good BS's,no masses, HSM or tenderness  EXTREMITIES: FROM of all  joints    ASSESSMENT AND PLAN:     Encounter Diagnoses   Name Primary?    Pneumonia of right lower lobe due to infectious organism Yes    Abnormal CXR     Tobacco dependence     Type 2 diabetes mellitus without complication, without long-term current use of insulin (HCC)     Essential hypertension     Hypercholesteremia     Atrial fibrillation and flutter (HCC)      No orders of the defined types were placed in this encounter.    Meds & Refills for this Visit:  Requested Prescriptions     Signed Prescriptions Disp Refills    benzonatate 100 MG Oral Cap 30 capsule 0     Sig: Take 1 capsule (100 mg total) by mouth 3 (three) times daily as needed for cough.     Imaging & Consults:  XR CHEST PA + LAT CHEST (CPT=71046)  No follow-ups on file.  Patient Instructions   Urgent care records, I reviewed imaging with comparison to previous.  I discussed new findings of elevated right hemidiaphragm, likely due to pneumonia.  However, given the patient's long smoking history and continued tobacco dependence, I have asked him to repeat a chest x-ray in 1 to 2 weeks  Patient was counseled on smoking cessation strategies as well as medication options including risk, benefits, side effects, anticipated response  Discussed the importance of home blood pressure and blood sugar monitoring  Patient be due for follow-up labs the end of August or beginning of September  Recommend daily symptom limited walking regimen   The patient indicates understanding of these issues and agrees to the plan.    Carlton Sosa DO, FAAFP    Labs Aug/Sept         [1]   Current Outpatient Medications   Medication Sig Dispense Refill    topiramate 25 MG Oral Tab Take 3 tablets (75 mg total) by mouth 2 (two) times daily. (Patient taking differently: Take 4 tablets (100 mg total) by mouth 2 (two) times daily.)      TRAMADOL 50 MG Oral Tab TAKE 1 TABLET BY MOUTH TWICE A DAY AS NEEDED FOR PAIN 56 tablet 0    gabapentin 100 MG Oral Cap Take 1 capsule (100 mg  total) by mouth 2 (two) times daily. 180 capsule 1    benzonatate 100 MG Oral Cap Take 1 capsule (100 mg total) by mouth 3 (three) times daily as needed for cough. 30 capsule 0    DILTIAZEM  MG Oral Capsule SR 24 Hr TAKE 1 CAPSULE BY MOUTH EVERY DAY 90 capsule 3    rivaroxaban (XARELTO) 20 MG Oral Tab Take 1 tablet (20 mg total) by mouth daily. 90 tablet 3    Tiotropium Bromide-Olodaterol (STIOLTO RESPIMAT) 2.5-2.5 MCG/ACT Inhalation Aero Soln Inhale 2 Inhalations into the lungs daily. 1 each 5    albuterol 108 (90 Base) MCG/ACT Inhalation Aero Soln Inhale 2 puffs into the lungs every 4 (four) hours as needed for Wheezing or Shortness of Breath. 1 each 1    rosuvastatin 10 MG Oral Tab Take 1 tablet (10 mg total) by mouth every evening. 90 tablet 3    diclofenac 50 MG Oral Tab EC Take 1 tablet (50 mg total) by mouth 2 (two) times daily. 180 tablet 1    METFORMIN 500 MG Oral Tab TAKE 1 TABLET BY MOUTH TWICE A DAY WITH FOOD 180 tablet 1    LANSOPRAZOLE 30 MG Oral Capsule Delayed Release TAKE 1 CAPSULE (30 MG TOTAL) BY MOUTH BEFORE BREAKFAST. 90 capsule 3    SOLIFENACIN SUCCINATE 5 MG Oral Tab TAKE 1 TABLET BY MOUTH ONCE DAILY. 90 tablet 3    Glucose Blood (BLOOD GLUCOSE TEST) In Vitro Strip As directed---use to check blood glucose once daily 100 strip 3    Blood Glucose Monitoring Suppl Does not apply Device As directed---use to check blood glucose once daily 1 each 0    Lancets Does not apply Misc As directed---use to check blood glucose once daily 100 each 3    Multiple Vitamins-Minerals (EMERGEN-C IMMUNE OR) Take by mouth. Powder pack      Vitamin C 500 MG Oral Tab Take 1 tablet (500 mg total) by mouth in the morning and 1 tablet (500 mg total) before bedtime.      amoxicillin clavulanate 875-125 MG Oral Tab Take 1 tablet by mouth 2 (two) times daily for 7 days. 14 tablet 0   [2]   Past Medical History:   Abnormal nuclear stress test    Atrial fibrillation (HCC)    Esophageal reflux    Essential hypertension     Frequent use of laxatives    Clearlax daily    H/O Salmonella gastroenteritis    Hearing loss    Hearing aides    Hypercholesteremia    Lung nodule    LLL, stable on serial CT scans since 2012    Obesity    Osteoarthritis of right knee    Tobacco dependence    Type 2 diabetes mellitus (HCC)   [3]   Social History  Socioeconomic History    Marital status:    Tobacco Use    Smoking status: Every Day     Current packs/day: 0.50     Average packs/day: 0.5 packs/day for 40.0 years (20.0 ttl pk-yrs)     Types: Cigarettes     Passive exposure: Never    Smokeless tobacco: Never    Tobacco comments:     Pt has been cutting back over 2 weeks ago, total 50 pk yr   Vaping Use    Vaping status: Never Used   Substance and Sexual Activity    Alcohol use: No    Drug use: No   Other Topics Concern    Caffeine Concern Yes     Comment: 1.5 cup a day & 1 soda a week    Exercise No    Seat Belt Yes    Special Diet No    Stress Concern No    Weight Concern Yes     Social Drivers of Health     Food Insecurity: No Food Insecurity (3/13/2025)    NCSS - Food Insecurity     Worried About Running Out of Food in the Last Year: No     Ran Out of Food in the Last Year: No   Transportation Needs: No Transportation Needs (3/13/2025)    NCSS - Transportation     Lack of Transportation: No   Housing Stability: Not At Risk (3/13/2025)    NCSS - Housing/Utilities     Has Housing: Yes     Worried About Losing Housing: No     Unable to Get Utilities: No

## 2025-06-16 NOTE — TELEPHONE ENCOUNTER
Patient needs refill, albuterol 108 (90 Base) MCG/ACT Inhalation Aero Soln, [facility]. Patient unsure how much is left in it and going out of town this weekend.

## 2025-06-16 NOTE — TELEPHONE ENCOUNTER
Last office visit: 6/13/25    Future Appointments   Date Time Provider Department Center   6/23/2025  1:15 PM SW XR RM 1 [facility] [place]   8/14/2025 10:00 AM Faviola Charlton PA [facility] [place]   9/8/2025  8:00 AM REF [facility]     Last filled: 3/13/25  Last labs: 2/3/25

## 2025-06-17 ENCOUNTER — TELEPHONE (OUTPATIENT)
Dept: NEUROLOGY | Facility: CLINIC | Age: 65
End: 2025-06-17

## 2025-06-17 RX ORDER — ALBUTEROL SULFATE 90 UG/1
2 INHALANT RESPIRATORY (INHALATION) EVERY 4 HOURS PRN
Qty: 1 EACH | Refills: 1 | Status: SHIPPED | OUTPATIENT
Start: 2025-06-17

## 2025-06-17 NOTE — TELEPHONE ENCOUNTER
Patient's wife Jasmin called and they are wondering what dosage Dr. Rush would prescribe for the Propranolol. Per last office visit Jasmin states Dr. Rush asked Mango to check with Dr. Oneal (cardiologist) if taking Propranolol is ok, but Dr. Oneal needs to know what dosage of this medication Dr. Rush would prescribe, so that Dr. Oneal can state 'yes ok to take' or 'no not ok'. Please return Jasmin's call at [phone]. Thank you.

## 2025-06-18 NOTE — TELEPHONE ENCOUNTER
Left message that beta blockers at high doses are usually required to control tremors  Starting at 60 mg daily up to 240 mg daily    Dr. SHONA Rush

## 2025-06-18 NOTE — TELEPHONE ENCOUNTER
Patient and spouse asking for more clarification about medication propranolol.     Questions answered. Both expressed understanding.

## 2025-06-23 ENCOUNTER — PATIENT MESSAGE (OUTPATIENT)
Dept: FAMILY MEDICINE CLINIC | Facility: CLINIC | Age: 65
End: 2025-06-23

## 2025-06-23 ENCOUNTER — HOSPITAL ENCOUNTER (OUTPATIENT)
Dept: GENERAL RADIOLOGY | Age: 65
Discharge: HOME | End: 2025-06-23
Attending: FAMILY MEDICINE
Payer: COMMERCIAL

## 2025-06-23 DIAGNOSIS — R93.89 ABNORMAL CXR: ICD-10-CM

## 2025-06-23 DIAGNOSIS — R93.89 ABNORMAL CHEST X-RAY: Primary | ICD-10-CM

## 2025-06-23 DIAGNOSIS — J18.9 PNEUMONIA OF RIGHT LOWER LOBE DUE TO INFECTIOUS ORGANISM: ICD-10-CM

## 2025-06-23 PROCEDURE — 71046 X-RAY EXAM CHEST 2 VIEWS: CPT | Performed by: FAMILY MEDICINE

## 2025-06-30 ENCOUNTER — HOSPITAL ENCOUNTER (OUTPATIENT)
Dept: CT IMAGING | Age: 65
Discharge: HOME | End: 2025-06-30
Attending: FAMILY MEDICINE
Payer: COMMERCIAL

## 2025-06-30 DIAGNOSIS — R93.89 ABNORMAL CHEST X-RAY: ICD-10-CM

## 2025-06-30 PROCEDURE — 71250 CT THORAX DX C-: CPT | Performed by: FAMILY MEDICINE

## 2025-07-01 ENCOUNTER — PATIENT MESSAGE (OUTPATIENT)
Dept: FAMILY MEDICINE CLINIC | Facility: CLINIC | Age: 65
End: 2025-07-01

## 2025-07-14 ENCOUNTER — OFFICE VISIT (OUTPATIENT)
Dept: FAMILY MEDICINE CLINIC | Facility: CLINIC | Age: 65
End: 2025-07-14
Payer: COMMERCIAL

## 2025-07-14 VITALS
OXYGEN SATURATION: 95 % | WEIGHT: 226 LBS | RESPIRATION RATE: 22 BRPM | SYSTOLIC BLOOD PRESSURE: 128 MMHG | BODY MASS INDEX: 33 KG/M2 | HEART RATE: 111 BPM | TEMPERATURE: 99 F | DIASTOLIC BLOOD PRESSURE: 82 MMHG

## 2025-07-14 DIAGNOSIS — R93.89 ABNORMAL CXR: ICD-10-CM

## 2025-07-14 DIAGNOSIS — T88.7XXA MEDICATION SIDE EFFECT: ICD-10-CM

## 2025-07-14 DIAGNOSIS — Z79.01 CURRENT USE OF LONG TERM ANTICOAGULATION: ICD-10-CM

## 2025-07-14 DIAGNOSIS — I48.91 ATRIAL FIBRILLATION AND FLUTTER (HCC): ICD-10-CM

## 2025-07-14 DIAGNOSIS — J43.9 PULMONARY EMPHYSEMA, UNSPECIFIED EMPHYSEMA TYPE (HCC): ICD-10-CM

## 2025-07-14 DIAGNOSIS — I48.92 ATRIAL FIBRILLATION AND FLUTTER (HCC): ICD-10-CM

## 2025-07-14 DIAGNOSIS — I10 ESSENTIAL HYPERTENSION: ICD-10-CM

## 2025-07-14 DIAGNOSIS — F17.200 TOBACCO DEPENDENCE: ICD-10-CM

## 2025-07-14 DIAGNOSIS — R68.2 DRY MOUTH: Primary | ICD-10-CM

## 2025-07-14 PROCEDURE — 3079F DIAST BP 80-89 MM HG: CPT | Performed by: FAMILY MEDICINE

## 2025-07-14 PROCEDURE — 3074F SYST BP LT 130 MM HG: CPT | Performed by: FAMILY MEDICINE

## 2025-07-14 PROCEDURE — 99214 OFFICE O/P EST MOD 30 MIN: CPT | Performed by: FAMILY MEDICINE

## 2025-07-14 RX ORDER — BUDESONIDE AND FORMOTEROL FUMARATE DIHYDRATE 160; 4.5 UG/1; UG/1
2 AEROSOL RESPIRATORY (INHALATION) 2 TIMES DAILY
Qty: 3 EACH | Refills: 0 | Status: SHIPPED | OUTPATIENT
Start: 2025-07-14

## 2025-07-14 NOTE — PROGRESS NOTES
HPI:   Gabriel Gamino Jr. is a 65 year old male who presents for upper respiratory symptoms for  5  weeks. Patient reports dry cough, ---, denies fever.  Chief Complaint   Patient presents with    Sore Throat    Ear Pain   Ear lobes are numb, occ left ear pain for a few minutes, chronic tinnitis, +white sputum in am  Pneumonia last month, +FINK, still smoking, throat is scratchy, dry, glands are swollen, dry mouth, using albuterol 1-2 x per day,using stiolto, dry mouth  Chest CT 6/30/25  CONCLUSION:     Right basilar airspace opacity is again noted. This is favored to be related to atelectasis which may be in part be due to elevation of the right hemidiaphragm. Mild bronchiectasis is present within the lower lobes. If there is persistent concern then   consider follow-up imaging or direct visualization.     Trace secretions are present within the trachea. Consider correlation for aspiration.   Pt getting carotid doppler  Current Medications[1]   Past Medical History[2]   Past Surgical History[3]   Family History[4]   Short Social Hx on File[5]      REVIEW OF SYSTEMS:   GENERAL: fever: no, chills:no, fatigue:  no  SKIN: no rashes  EYES:denies itching, discharge, irritation  ENT:  see hpi  LUNGS: + shortness of breath ,+ cough, occ sputum, no wheezing,+ chest tightness response to albuterol  CARDIOVASCULAR: denies chest pain , palpatations  GI: no nausea or abdominal pain  NEURO: denies headaches    EXAM:   /82 (BP Location: Left arm, Patient Position: Sitting, Cuff Size: adult)   Pulse 111   Temp 98.8 °F (37.1 °C) (Temporal)   Resp 22   Wt 226 lb (102.5 kg)   SpO2 95%   BMI 33.37 kg/m²   GENERAL: well developed, well nourished,in no apparent distress  SKIN: warm and dry, no rashes,   EYES:  conjunctiva are clear, lids and lashes normal  ENT: TMs: normal, Pinna; normal, Turbinates :clear, Discharge:none, Pharynx: Dry oral mucosa, no erythema, no oral lesions, fair dentition, Tonsils: Absent, no facial  tenderness to palpation  NECK: supple,no adenopathy  LUNGS: Distant breath sounds, no wheezes, rhonchi, rales  CARDIO: Heart rate and rhythm irregularly irregular without murmur, no edema  Neuro: Resting tremor  ASSESSMENT AND PLAN:   Gabriel Gamino Jr. is a 65 year old male who presents with   Encounter Diagnoses   Name Primary?    Dry mouth Yes    Medication side effect     Tobacco dependence     Pulmonary emphysema, unspecified emphysema type (HCC)     Atrial fibrillation and flutter (HCC)     Essential hypertension     Current use of long term anticoagulation     Abnormal CXR      No orders of the defined types were placed in this encounter.    Meds & Refills for this Visit:  Requested Prescriptions     Signed Prescriptions Disp Refills    Budesonide-Formoterol Fumarate 160-4.5 MCG/ACT Inhalation Aerosol 3 each 0     Sig: Inhale 2 puffs into the lungs 2 (two) times daily.     Imaging & Consults:  None  No follow-ups on file.  Patient Instructions   1. Dry mouth  I advised patient I suspect his dry mouth is from the Stiolto inhaler.  Recommend increase fluid intake, sour gum or anything to increase saliva    2. Medication side effect  See #1    3. Tobacco dependence  Patient strongly counseled to stop smoking in the setting of COPD    4. Pulmonary emphysema, unspecified emphysema type (HCC)  Will add Symbicort 160-4.5  2 puffs twice daily, rinse mouth after use, continue albuterol 2 puffs every 4 hours as needed for cough, wheezing, chest tightness  Continue Stiolto for now    5. Atrial fibrillation and flutter (HCC)  Continue anticoagulation and rate control    6. Essential hypertension  Continue current meds and monitoring    7. Current use of long term anticoagulation  Continue DOAC    8. Abnormal CXR  Repeat chest x-ray 2 months    Symptomatic treatment reviewed  Infection control reviewed  The patient indicates understanding of these issues and agrees to the plan.  The patient is asked to return if sx's  persist or worsen.   Carlton Sosa DO         [1]   Current Outpatient Medications   Medication Sig Dispense Refill    albuterol 108 (90 Base) MCG/ACT Inhalation Aero Soln Inhale 2 puffs into the lungs every 4 (four) hours as needed for Wheezing or Shortness of Breath. 1 each 1    topiramate 25 MG Oral Tab Take 3 tablets (75 mg total) by mouth 2 (two) times daily. (Patient taking differently: Take 4 tablets (100 mg total) by mouth 2 (two) times daily.)      TRAMADOL 50 MG Oral Tab TAKE 1 TABLET BY MOUTH TWICE A DAY AS NEEDED FOR PAIN 56 tablet 0    gabapentin 100 MG Oral Cap Take 1 capsule (100 mg total) by mouth 2 (two) times daily. 180 capsule 1    DILTIAZEM  MG Oral Capsule SR 24 Hr TAKE 1 CAPSULE BY MOUTH EVERY DAY 90 capsule 3    rivaroxaban (XARELTO) 20 MG Oral Tab Take 1 tablet (20 mg total) by mouth daily. 90 tablet 3    Tiotropium Bromide-Olodaterol (STIOLTO RESPIMAT) 2.5-2.5 MCG/ACT Inhalation Aero Soln Inhale 2 Inhalations into the lungs daily. 1 each 5    rosuvastatin 10 MG Oral Tab Take 1 tablet (10 mg total) by mouth every evening. 90 tablet 3    diclofenac 50 MG Oral Tab EC Take 1 tablet (50 mg total) by mouth 2 (two) times daily. 180 tablet 1    METFORMIN 500 MG Oral Tab TAKE 1 TABLET BY MOUTH TWICE A DAY WITH FOOD 180 tablet 1    LANSOPRAZOLE 30 MG Oral Capsule Delayed Release TAKE 1 CAPSULE (30 MG TOTAL) BY MOUTH BEFORE BREAKFAST. 90 capsule 3    SOLIFENACIN SUCCINATE 5 MG Oral Tab TAKE 1 TABLET BY MOUTH ONCE DAILY. 90 tablet 3    Glucose Blood (BLOOD GLUCOSE TEST) In Vitro Strip As directed---use to check blood glucose once daily 100 strip 3    Blood Glucose Monitoring Suppl Does not apply Device As directed---use to check blood glucose once daily 1 each 0    Lancets Does not apply Misc As directed---use to check blood glucose once daily 100 each 3    Multiple Vitamins-Minerals (EMERGEN-C IMMUNE OR) Take by mouth. Powder pack      Vitamin C 500 MG Oral Tab Take 1 tablet (500 mg total)  by mouth in the morning and 1 tablet (500 mg total) before bedtime.     [2]   Past Medical History:   Abnormal nuclear stress test    Atrial fibrillation (HCC)    Esophageal reflux    Essential hypertension    Frequent use of laxatives    Clearlax daily    H/O Salmonella gastroenteritis    Hearing loss    Hearing aides    Hypercholesteremia    Lung nodule    LLL, stable on serial CT scans since     Obesity    Osteoarthritis of right knee    Tobacco dependence    Type 2 diabetes mellitus (HCC)   [3]   Past Surgical History:  Procedure Laterality Date    Colonoscopy  2013    Colonoscopy  2025    Tubular adenoma x 1    Egd  2025    Erosive gastritis, negative biopsies    Hernia surgery Left     inguinal with mesh    Hernia surgery      umbilical    Left heart cath,cutdown  2025    Ejection fraction 65%, normal mitral valve, moderate nonobstructive disease less than 50% stenosis    Left heart cath,cutdown  2025    Ejection fraction 60 to 65%, mildly elevated pulmonary artery pressures, diffuse nonobstructive plaquing, less than 50% stenosis    Nuc stress test, cardio (dmg)  2025    PET perfusion pharmacologic nuclear stress test-abnormal    Other surgical history Left     KNEE SURGERY FOR CARTILAGE TEAR    Other surgical history Right     knee X 2- Arthroscopy    Other surgical history Right 2024    impingment and \"frozen shoulder\"    Tonsillectomy     [4]   Family History  Problem Relation Age of Onset    Cancer Father 70        PROSTATE CANCER    Heart Disorder Father         TIA    Prostate Cancer Father     Psychiatric Mother         DEMENTIA    Diabetes Mother             Heart Disorder Mother         CAD    Lipids Mother     Hypertension Mother     Lipids Brother     Diabetes Sister     Diabetes Brother     Hypertension Brother     No Known Problems Sister     No Known Problems Sister    [5]   Social History  Socioeconomic History    Marital status:     Tobacco Use    Smoking status: Every Day     Current packs/day: 0.50     Average packs/day: 0.5 packs/day for 40.0 years (20.0 ttl pk-yrs)     Types: Cigarettes     Passive exposure: Never    Smokeless tobacco: Never    Tobacco comments:     Pt has been cutting back over 2 weeks ago, total 50 pk yr   Vaping Use    Vaping status: Never Used   Substance and Sexual Activity    Alcohol use: No    Drug use: No   Other Topics Concern    Caffeine Concern Yes     Comment: 1.5 cup a day & 1 soda a week    Exercise No    Seat Belt Yes    Special Diet No    Stress Concern No    Weight Concern Yes     Social Drivers of Health     Food Insecurity: No Food Insecurity (3/13/2025)    NCSS - Food Insecurity     Worried About Running Out of Food in the Last Year: No     Ran Out of Food in the Last Year: No   Transportation Needs: No Transportation Needs (3/13/2025)    NCSS - Transportation     Lack of Transportation: No   Housing Stability: Not At Risk (3/13/2025)    NCSS - Housing/Utilities     Has Housing: Yes     Worried About Losing Housing: No     Unable to Get Utilities: No

## 2025-07-14 NOTE — PATIENT INSTRUCTIONS
1. Dry mouth  I advised patient I suspect his dry mouth is from the Stiolto inhaler.  Recommend increase fluid intake, sour gum or anything to increase saliva    2. Medication side effect  See #1    3. Tobacco dependence  Patient strongly counseled to stop smoking in the setting of COPD    4. Pulmonary emphysema, unspecified emphysema type (HCC)  Will add Symbicort 160-4.5  2 puffs twice daily, rinse mouth after use, continue albuterol 2 puffs every 4 hours as needed for cough, wheezing, chest tightness  Continue Stiolto for now    5. Atrial fibrillation and flutter (HCC)  Continue anticoagulation and rate control    6. Essential hypertension  Continue current meds and monitoring    7. Current use of long term anticoagulation  Continue DOAC    8. Abnormal CXR  Repeat chest x-ray 2 months

## 2025-07-21 ENCOUNTER — TELEPHONE (OUTPATIENT)
Dept: FAMILY MEDICINE CLINIC | Facility: CLINIC | Age: 65
End: 2025-07-21

## 2025-07-21 ENCOUNTER — TELEPHONE (OUTPATIENT)
Dept: NEUROLOGY | Facility: CLINIC | Age: 65
End: 2025-07-21

## 2025-07-21 NOTE — TELEPHONE ENCOUNTER
Spoke with Jasmin - there is a form on Mychart that is not allowing her to exit out of. It seems to be in reference to the (completed) 7/14/25 appointment. From description, it sounds like the e-check in form. She says when she tries to cancel there is a message that the form will not be saved. Advised her to cancel out, ok that it doesn't save since appt is past. She will send a Mychart if there are still issues

## 2025-07-21 NOTE — TELEPHONE ENCOUNTER
Question about what she found in his my chart and the papers that he needs to address. She said that this does not pertain to him

## 2025-07-21 NOTE — TELEPHONE ENCOUNTER
To initiate Nicole Trio therapy for essential tremors. Start form sent to patient via Symform attachment, will sign and return. Also sent directions to measure both wrists for device. Referred patient to [email or web address] website for more information.  Start form placed in New Start basket, awaiting signature.

## 2025-07-23 NOTE — TELEPHONE ENCOUNTER
Signed document faxed to Nicole Dasilva @  [phone] with confirmation fax.  Documents placed in Hold for Authorization bin.    Patient informed.

## 2025-08-07 RX ORDER — TOPIRAMATE 25 MG/1
100 TABLET, FILM COATED ORAL 2 TIMES DAILY
Qty: 360 TABLET | Refills: 1 | Status: SHIPPED | OUTPATIENT
Start: 2025-08-07

## 2025-08-09 RX ORDER — BLOOD SUGAR DIAGNOSTIC
1 STRIP MISCELLANEOUS DAILY
Qty: 100 STRIP | Refills: 3 | Status: SHIPPED | OUTPATIENT
Start: 2025-08-09

## 2025-08-14 ENCOUNTER — OFFICE VISIT (OUTPATIENT)
Dept: NEUROLOGY | Facility: CLINIC | Age: 65
End: 2025-08-14

## 2025-08-14 VITALS
WEIGHT: 225 LBS | DIASTOLIC BLOOD PRESSURE: 79 MMHG | HEART RATE: 91 BPM | RESPIRATION RATE: 16 BRPM | BODY MASS INDEX: 33.33 KG/M2 | SYSTOLIC BLOOD PRESSURE: 127 MMHG | HEIGHT: 69 IN

## 2025-08-14 DIAGNOSIS — G25.0 ESSENTIAL TREMOR: Primary | ICD-10-CM

## 2025-08-14 PROCEDURE — 99214 OFFICE O/P EST MOD 30 MIN: CPT | Performed by: PHYSICIAN ASSISTANT

## 2025-08-14 PROCEDURE — 3008F BODY MASS INDEX DOCD: CPT | Performed by: PHYSICIAN ASSISTANT

## 2025-08-14 PROCEDURE — 3074F SYST BP LT 130 MM HG: CPT | Performed by: PHYSICIAN ASSISTANT

## 2025-08-14 PROCEDURE — 3078F DIAST BP <80 MM HG: CPT | Performed by: PHYSICIAN ASSISTANT

## (undated) DIAGNOSIS — Z12.5 SCREENING FOR PROSTATE CANCER: ICD-10-CM

## (undated) DIAGNOSIS — I10 ESSENTIAL HYPERTENSION: ICD-10-CM

## (undated) DIAGNOSIS — E78.00 HYPERCHOLESTEREMIA: ICD-10-CM

## (undated) DIAGNOSIS — Z79.899 ENCOUNTER FOR LONG-TERM (CURRENT) DRUG USE: ICD-10-CM

## (undated) DIAGNOSIS — E11.9 TYPE 2 DIABETES MELLITUS WITHOUT COMPLICATION, WITHOUT LONG-TERM CURRENT USE OF INSULIN (HCC): Primary | ICD-10-CM

## (undated) DIAGNOSIS — Z12.5 SCREENING FOR PROSTATE CANCER: Primary | ICD-10-CM

## (undated) NOTE — MR AVS SNAPSHOT
Conrado Espinosa  [address] [facility] [id number] [phone]               Thank you for choosing us for your health care visit with Conrado Sofia. Lucy Rios DO.   We are glad to serve you and happy to provide you with this sum Moderation of alcohol. Avoid nsaids and aspirin.   May use Tylenol prn pain  Rest, push fluids, Tylenol or Ibuprofen as needed for fever or chills, saline nasal spray, or netti pot as needed for congestion, Mucinex as expectorant, may use Echinacea at olivia taking this medication, and follow the directions you see here.    Commonly known as:  ZOCOR           XARELTO 20 MG Tabs   Generic drug:  Rivaroxaban   TAKE 1 TABLET BY MOUTH DAILY                   Today's Orders     Urine Dip, auto without Micro    Compl

## (undated) NOTE — MR AVS SNAPSHOT
[facility]  [address] [place] [address] Kirstin Cavazos [id number] [phone]               Thank you for choosing us for your health care visit with Haris Ferrer. Lin Kelly DO.   We are glad to serve you and happy to provide you with this sum Today's Vital Signs     BP Pulse Temp             120/70 mmHg 76 98.1 °F (36.7 °C) (Temporal)            Current Medications          This list is accurate as of: 3/24/17  5:08 PM.  Always use your most recent med list.                [address] [facility] You can access your MyChart to more actively manage your health care and view more details from this visit by going to [email or web address].   If you've recently had a stay at the Hospital you can access your discharge instructions in [address] by carlos You don’t need to join a gym. Home exercises work great.  Put more priority on exercise in your life                    Visit [facility] online at  [email or web address]

## (undated) NOTE — MR AVS SNAPSHOT
[place]  [address] [facility] [id number]  [phone]               Thank you for choosing us for your health care visit with Bertrand Mancia MD.  We are glad to serve you and happy to provide you with this summary o simvastatin 10 MG Tabs   Take 0.5 tablets (5 mg total) by mouth nightly.    Commonly known as:  ZOCOR           XARELTO 20 MG Tabs   Generic drug:  Rivaroxaban   TAKE 1 TABLET BY MOUTH DAILY                   MyChart     Visit MyChart  You can acces

## (undated) NOTE — Clinical Note
01/12/2017      Gabriel Gamino Jr. [address] [facility]         Dear Koffi Portillo,    This letter is a reminder that you are due for blood work.    Blood work has been ordered by Dr. Mae Hill which requires a 12 hour fast.  You may drink water and/or felipa